# Patient Record
Sex: MALE | Race: WHITE
[De-identification: names, ages, dates, MRNs, and addresses within clinical notes are randomized per-mention and may not be internally consistent; named-entity substitution may affect disease eponyms.]

---

## 2017-10-15 ENCOUNTER — HOSPITAL ENCOUNTER (INPATIENT)
Dept: HOSPITAL 45 - C.EDB | Age: 72
LOS: 9 days | Discharge: NURSING, REHAB OR LONG-TERM CARE | DRG: 291 | End: 2017-10-24
Attending: HOSPITALIST | Admitting: FAMILY MEDICINE
Payer: COMMERCIAL

## 2017-10-15 VITALS
TEMPERATURE: 97.16 F | SYSTOLIC BLOOD PRESSURE: 190 MMHG | OXYGEN SATURATION: 94 % | HEART RATE: 72 BPM | DIASTOLIC BLOOD PRESSURE: 73 MMHG

## 2017-10-15 VITALS — OXYGEN SATURATION: 96 % | HEART RATE: 72 BPM

## 2017-10-15 VITALS
HEIGHT: 71 IN | BODY MASS INDEX: 34.38 KG/M2 | WEIGHT: 245.59 LBS | HEIGHT: 71 IN | BODY MASS INDEX: 34.38 KG/M2 | BODY MASS INDEX: 34.38 KG/M2 | WEIGHT: 245.59 LBS

## 2017-10-15 VITALS
OXYGEN SATURATION: 99 % | DIASTOLIC BLOOD PRESSURE: 87 MMHG | SYSTOLIC BLOOD PRESSURE: 175 MMHG | HEART RATE: 76 BPM | TEMPERATURE: 97.88 F

## 2017-10-15 VITALS — DIASTOLIC BLOOD PRESSURE: 85 MMHG | SYSTOLIC BLOOD PRESSURE: 163 MMHG | HEART RATE: 76 BPM

## 2017-10-15 VITALS — OXYGEN SATURATION: 93 % | HEART RATE: 86 BPM

## 2017-10-15 VITALS
TEMPERATURE: 98.06 F | HEART RATE: 70 BPM | OXYGEN SATURATION: 95 % | DIASTOLIC BLOOD PRESSURE: 74 MMHG | SYSTOLIC BLOOD PRESSURE: 152 MMHG

## 2017-10-15 VITALS — OXYGEN SATURATION: 96 %

## 2017-10-15 DIAGNOSIS — Z89.431: ICD-10-CM

## 2017-10-15 DIAGNOSIS — I50.43: ICD-10-CM

## 2017-10-15 DIAGNOSIS — K76.0: ICD-10-CM

## 2017-10-15 DIAGNOSIS — Z95.1: ICD-10-CM

## 2017-10-15 DIAGNOSIS — T43.215A: ICD-10-CM

## 2017-10-15 DIAGNOSIS — E11.51: ICD-10-CM

## 2017-10-15 DIAGNOSIS — E03.9: ICD-10-CM

## 2017-10-15 DIAGNOSIS — T43.015A: ICD-10-CM

## 2017-10-15 DIAGNOSIS — Z95.810: ICD-10-CM

## 2017-10-15 DIAGNOSIS — N18.4: ICD-10-CM
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
## 2017-10-15 DIAGNOSIS — Z86.74: ICD-10-CM

## 2017-10-15 DIAGNOSIS — E11.319: ICD-10-CM

## 2017-10-15 DIAGNOSIS — E11.42: ICD-10-CM

## 2017-10-15 DIAGNOSIS — I25.2: ICD-10-CM

## 2017-10-15 DIAGNOSIS — G25.1: ICD-10-CM

## 2017-10-15 DIAGNOSIS — G47.33: ICD-10-CM

## 2017-10-15 DIAGNOSIS — I13.0: Primary | ICD-10-CM

## 2017-10-15 DIAGNOSIS — I25.10: ICD-10-CM

## 2017-10-15 DIAGNOSIS — Z79.4: ICD-10-CM

## 2017-10-15 DIAGNOSIS — T43.295A: ICD-10-CM

## 2017-10-15 DIAGNOSIS — F32.9: ICD-10-CM

## 2017-10-15 DIAGNOSIS — K21.9: ICD-10-CM

## 2017-10-15 DIAGNOSIS — R91.1: ICD-10-CM

## 2017-10-15 LAB
ALP SERPL-CCNC: 39 U/L (ref 45–117)
ALT SERPL-CCNC: 34 U/L (ref 12–78)
AMYLASE SERPL-CCNC: 19 U/L (ref 25–115)
ANION GAP SERPL CALC-SCNC: 6 MMOL/L (ref 3–11)
AST SERPL-CCNC: 22 U/L (ref 15–37)
BASOPHILS # BLD: 0.02 K/UL (ref 0–0.2)
BASOPHILS NFR BLD: 0.4 %
BUN SERPL-MCNC: 22 MG/DL (ref 7–18)
BUN/CREAT SERPL: 10.3 (ref 10–20)
CALCIUM SERPL-MCNC: 8.2 MG/DL (ref 8.5–10.1)
CHLORIDE SERPL-SCNC: 107 MMOL/L (ref 98–107)
CKMB/CK RATIO: 1.9 (ref 0–3)
CKMB/CK RATIO: 1.9 (ref 0–3)
CO2 SERPL-SCNC: 27 MMOL/L (ref 21–32)
COMPLETE: YES
CREAT CL PREDICTED SERPL C-G-VRATE: 41 ML/MIN
CREAT SERPL-MCNC: 2.1 MG/DL (ref 0.6–1.4)
EOSINOPHIL NFR BLD AUTO: 160 K/UL (ref 130–400)
GLUCOSE SERPL-MCNC: 149 MG/DL (ref 70–99)
HCT VFR BLD CALC: 38.4 % (ref 42–52)
IG%: 0.4 %
IMM GRANULOCYTES NFR BLD AUTO: 22.7 %
INR PPP: 1.1 (ref 0.9–1.1)
LYMPHOCYTES # BLD: 1.12 K/UL (ref 1.2–3.4)
MAGNESIUM SERPL-MCNC: 2.4 MG/DL (ref 1.8–2.4)
MCH RBC QN AUTO: 28.8 PG (ref 25–34)
MCHC RBC AUTO-ENTMCNC: 33.9 G/DL (ref 32–36)
MCV RBC AUTO: 85.1 FL (ref 80–100)
MONOCYTES NFR BLD: 8.7 %
NEUTROPHILS # BLD AUTO: 1.8 %
NEUTROPHILS NFR BLD AUTO: 66 %
PARTIAL THROMBOPLASTIN RATIO: 1.2
PMV BLD AUTO: 10.8 FL (ref 7.4–10.4)
POTASSIUM SERPL-SCNC: 3.6 MMOL/L (ref 3.5–5.1)
PROTHROMBIN TIME: 11.3 SECONDS (ref 9–12)
RBC # BLD AUTO: 4.51 M/UL (ref 4.7–6.1)
SODIUM SERPL-SCNC: 140 MMOL/L (ref 136–145)
WBC # BLD AUTO: 4.94 K/UL (ref 4.8–10.8)

## 2017-10-15 RX ADMIN — Medication SCH MG: at 20:46

## 2017-10-15 RX ADMIN — Medication SCH MG: at 20:47

## 2017-10-15 RX ADMIN — SODIUM CHLORIDE SCH MLS/HR: 900 INJECTION, SOLUTION INTRAVENOUS at 12:16

## 2017-10-15 RX ADMIN — HEPARIN SODIUM SCH UNIT: 10000 INJECTION, SOLUTION INTRAVENOUS; SUBCUTANEOUS at 20:42

## 2017-10-15 RX ADMIN — IPRATROPIUM BROMIDE AND ALBUTEROL SULFATE SCH ML: .5; 3 SOLUTION RESPIRATORY (INHALATION) at 15:10

## 2017-10-15 RX ADMIN — Medication SCH MG: at 13:35

## 2017-10-15 RX ADMIN — DOXYCYCLINE HYCLATE SCH MG: 100 CAPSULE, GELATIN COATED ORAL at 22:30

## 2017-10-15 RX ADMIN — DULOXETINE SCH MG: 30 CAPSULE, DELAYED RELEASE PELLETS ORAL at 20:48

## 2017-10-15 RX ADMIN — INSULIN ASPART SCH UNITS: 100 INJECTION, SOLUTION INTRAVENOUS; SUBCUTANEOUS at 13:24

## 2017-10-15 RX ADMIN — PREGABALIN SCH MG: 150 CAPSULE ORAL at 20:45

## 2017-10-15 RX ADMIN — BUPROPION HYDROCHLORIDE SCH MG: 150 TABLET, EXTENDED RELEASE ORAL at 13:36

## 2017-10-15 RX ADMIN — ALUMINUM ZIRCONIUM TRICHLOROHYDREX GLY SCH EA: 0.2 STICK TOPICAL at 22:32

## 2017-10-15 RX ADMIN — INSULIN ASPART SCH UNITS: 100 INJECTION, SOLUTION INTRAVENOUS; SUBCUTANEOUS at 17:19

## 2017-10-15 RX ADMIN — METOPROLOL SUCCINATE SCH MG: 25 TABLET, EXTENDED RELEASE ORAL at 20:47

## 2017-10-15 RX ADMIN — IPRATROPIUM BROMIDE AND ALBUTEROL SULFATE SCH ML: .5; 3 SOLUTION RESPIRATORY (INHALATION) at 18:57

## 2017-10-15 RX ADMIN — ALUMINUM ZIRCONIUM TRICHLOROHYDREX GLY SCH EA: 0.2 STICK TOPICAL at 16:00

## 2017-10-15 RX ADMIN — CLOPIDOGREL BISULFATE SCH MG: 75 TABLET, FILM COATED ORAL at 13:36

## 2017-10-15 RX ADMIN — INSULIN ASPART SCH UNITS: 100 INJECTION, SOLUTION INTRAVENOUS; SUBCUTANEOUS at 20:43

## 2017-10-15 RX ADMIN — PANTOPRAZOLE SCH MG: 40 TABLET, DELAYED RELEASE ORAL at 13:36

## 2017-10-15 RX ADMIN — DULOXETINE SCH MG: 60 CAPSULE, DELAYED RELEASE PELLETS ORAL at 13:35

## 2017-10-15 RX ADMIN — BUPROPION HYDROCHLORIDE SCH MG: 150 TABLET, EXTENDED RELEASE ORAL at 20:46

## 2017-10-15 RX ADMIN — INSULIN GLARGINE SCH UNITS: 100 INJECTION, SOLUTION SUBCUTANEOUS at 20:41

## 2017-10-15 RX ADMIN — ROSUVASTATIN CALCIUM SCH MG: 20 TABLET, FILM COATED ORAL at 13:36

## 2017-10-15 RX ADMIN — HEPARIN SODIUM SCH UNIT: 10000 INJECTION, SOLUTION INTRAVENOUS; SUBCUTANEOUS at 15:32

## 2017-10-15 NOTE — DIAGNOSTIC IMAGING REPORT
CHEST ONE VIEW PORTABLE



HISTORY:      Short of breath. Cough.



COMPARISON: Chest 10/19/2010.



FINDINGS: Patient is slightly rotated on this study. Left-sided single lead

pacemaker/defibrillator. Poststernotomy changes. Linear scarlike density within

the right midlung zone. Old, healed left-sided rib fractures. The heart remains

mildly enlarged. No pleural effusions. No pneumothorax. No evidence for

pulmonary edema.



IMPRESSION:



1. Stable mild cardiomegaly.

2. Linear scarlike density within the right midlung zone.







Electronically signed by:  Derrick Estrada M.D.

10/15/2017 7:31 AM



Dictated Date/Time:  10/15/2017 7:29 AM

## 2017-10-15 NOTE — HISTORY AND PHYSICAL
History & Physical


Date & Time of Service:


Oct 15, 2017 at 10:41


Chief Complaint:


Sob,Nausea,Coughing Up Phlem,Sore Throat


Primary Care Physician:


Javed Mccormick M.D.


History of Present Illness


Source:  patient, family


This is a 72 year old male with a PMH of CAD s/p CABGx2, Hx. of Cardiac Arrest 

and V-Fib s/p AICD placement, insulin dependent DM2 with complications 

including peripheral vascular disease s/p R foot transmetatarsal amputation, 

CKD stage IV, SABRA on nocturnal O2, Hypothyroidism, Mood Disorder with 

depression - presents with shortness of breath. As per patient, he has been 

having shortness of breath and "throat pain" for the past three weeks. He has 

seen his primary care physician; and was given antibiotics at that time. He 

said he never fully recovered. Last evening, while laying down, he developed 

worsening shortness of breath. He uses O2 nocturnally due to sleep apnea (did 

not tolerate CPAP mask); and he turned it up to 3L of O2 with no help. He also 

developed some chest/throat pain. Denies fevers/chills. Denies nausea/vomiting.





Past Medical/Surgical History


Surgical Problems:


(1) Hx of CABG


Status: Resolved  











Social History


Smoking Status:  Never Smoker


Marital Status:  


Housing status:  lives with family





Immunizations


History of Influenza Vaccine:  Yes


Influenza Vaccine Date:  Sep 18, 2010


History of Tetanus Vaccine?:  Unknown


History of Pneumococcal:  Yes


Pneumococcal Date:  Sep 18, 2010


History of Hepatitis B Vaccine:  No





Multi-Drug Resistant Organisms


History of MDRO:  No





Allergies


Coded Allergies:  


     Moxifloxacin (Unverified  Adverse Reaction, Severe, "DEPLETED POTASSIUM 

AND MAGNESIUM. CARDIAC ARREST" PT WIFE, 10/15/17)





Home Medications


Scheduled


Allopurinol (Zyloprim), 300 MG PO DAILY


Amlodipine (Norvasc), 10 MG PO DAILY


Aspirin (Aspirin Ec), 81 MG PO DAILY


Bupropion Hcl (Smoking Deterre (Bupropion Hcl Sr), 1 TAB PO BID


Cholecalciferol (Vitamin D3), 2 TAB PO DAILY


Clopidogrel (Plavix), 75 MG PO DAILY


Duloxetine HCl (Cymbalta), 1 CAP PO HS


Duloxetine Hcl (Cymbalta), 60 MG PO DAILY


Fenofibrate (Tricor), 200 MG PO DAILY


Imipramine Hcl (Tofranil), 25 MG PO QAM


Imipramine Hcl (Tofranil), 2 TAB PO HS


Insulin Aspart (Novolog), 29 UNITS SC AC


Insulin Glargine (Lantus Solostar), 52 UNITS SC AMPM


Levothyroxine Sodium (Levothyroxine Sodium), 1 TAB PO DAILYBB


Magnesium Oxide (Mag-Ox), 400 MG PO BID


Metoprolol Succ (Toprol Xl) (Toprol-Xl), 1.5 TAB PO HS


Multivitamin (Multivitamin), 1 TAB PO DAILY


Nitroglycerin (Nitrostat), 0.4 MG UT PRN


Omega-3 Fatty Acids (Fish Oil 1200 mg), 2 CAP PO BID


Pantoprazole (Protonix), 40 MG PO DAILY


Pregabalin (Lyrica), 150 MG PO BID


Rosuvastatin Calcium (Crestor), 40 MG PO DAILY





Scheduled PRN


Oxymetazoline Hcl (Afrin 0.05% Nasal Spray), 2 SPRAYS ESTEVAN BID PRN for Nasal 

Congestion





Miscellaneous Medications


Home O2 Therapy (Oxygen), 2 LITERS NA





Review of Systems


Constitutional:  No fever, No chills, No sweats, No weakness, No fatigue


Respiratory:  + cough, + shortness of breath, + dyspnea on exertion, + dyspnea 

at rest, No sputum, No wheezing, No hemoptysis


Cardiovascular:  + chest pain, + orthopnea, + PND, + edema, No claudication, No 

palpitations


Abdomen:  No pain, No nausea, No vomiting, No diarrhea, No constipation, No GI 

bleeding, No problem reported


Musculoskeletal:  No joint pain, No muscle pain, No swelling, No calf pain


Genitourinary - Male:  No hematuria, No dysuria, No urinary frequency, No 

urinary urgency


Neurologic:  No memory loss, No weakness, No numbness/tingling, No vertigo


Psychiatric:  + depression symptoms (controlled with medications), No anxiety, 

No insomnia


Hematologic / Lymphatic:  No abnormal bleeding/bruising


Integumentary:  No rash


Allergic / Immunologic:  No environmental allergies, No seasonal allergies





Physical Exam


Vital Signs











  Date Time  Temp Pulse Resp B/P (MAP) Pulse Ox O2 Delivery O2 Flow Rate FiO2


 


10/15/17 10:00  68 20 177/94 96 Nasal Cannula 2.0 


 


10/15/17 09:30  72 20 173/81 94 Nasal Cannula 2.0 


 


10/15/17 09:00  68 20 161/89 94 Room Air  


 


10/15/17 08:30  68 20 177/86 95 Nasal Cannula 2.0 


 


10/15/17 08:00  64 20 169/86 94 Nasal Cannula 2.0 


 


10/15/17 07:48      Nasal Cannula 2.0 


 


10/15/17 07:30  68 18 168/88 91 Room Air  


 


10/15/17 06:51  67 18 173/90 94 Room Air  


 


10/15/17 06:51      Room Air  


 


10/15/17 06:51  65      


 


10/15/17 06:31 36.6 66 20 180/85 94 Room Air  


 


10/15/17 06:31     94 Room Air  








General Appearance:  no apparent distress, + obese


Head:  normocephalic, atraumatic


Eyes:  normal inspection


ENT:  hearing grossly normal


Respiratory/Chest:  chest non-tender, lungs clear, normal breath sounds, no 

respiratory distress, no accessory muscle use


Cardiovascular:  regular rate, rhythm, no edema, no gallop, no JVD, no murmur, 

normal peripheral pulses


Abdomen/GI:  normal bowel sounds, non tender, soft


Back:  no muscle spasm


Extremities/Musculoskelatal:  normal inspection, no calf tenderness, normal 

capillary refill, no pedal edema, normal range of motion, + pertinent finding (

R transmetatarsal amputation)


Neurologic/Psych:  no motor/sensory deficits, alert, normal mood/affect


Skin:  normal color


Lymphatic:  no adenopathy





Diagnostics


Laboratory Results





Results Past 24 Hours








Test


  10/15/17


07:10 10/15/17


07:18 10/15/17


10:32 Range/Units


 


 


White Blood Count 4.94   4.8-10.8  K/uL


 


Red Blood Count 4.51   4.7-6.1  M/uL


 


Hemoglobin 13.0   14.0-18.0  g/dL


 


Hematocrit 38.4   42-52  %


 


Mean Corpuscular Volume 85.1     fL


 


Mean Corpuscular Hemoglobin 28.8   25-34  pg


 


Mean Corpuscular Hemoglobin


Concent 33.9


  


  


  32-36  g/dl


 


 


Platelet Count 160   130-400  K/uL


 


Mean Platelet Volume 10.8   7.4-10.4  fL


 


Neutrophils (%) (Auto) 66.0    %


 


Lymphocytes (%) (Auto) 22.7    %


 


Monocytes (%) (Auto) 8.7    %


 


Eosinophils (%) (Auto) 1.8    %


 


Basophils (%) (Auto) 0.4    %


 


Neutrophils # (Auto) 3.26   1.4-6.5  K/uL


 


Lymphocytes # (Auto) 1.12   1.2-3.4  K/uL


 


Monocytes # (Auto) 0.43   0.11-0.59  K/uL


 


Eosinophils # (Auto) 0.09   0-0.5  K/uL


 


Basophils # (Auto) 0.02   0-0.2  K/uL


 


RDW Standard Deviation 42.9   36.4-46.3  fL


 


RDW Coefficient of Variation 13.8   11.5-14.5  %


 


Immature Granulocyte % (Auto) 0.4    %


 


Immature Granulocyte # (Auto) 0.02   0.00-0.02  K/uL


 


Prothrombin Time


  11.3


  


  


  9.0-12.0


SECONDS


 


Prothromb Time International


Ratio 1.1


  


  


  0.9-1.1  


 


 


Activated Partial


Thromboplast Time 29.9


  


  


  21.0-31.0


SECONDS


 


Partial Thromboplastin Ratio 1.2    


 


Sodium Level 140   136-145  mmol/L


 


Potassium Level 3.6   3.5-5.1  mmol/L


 


Chloride Level 107     mmol/L


 


Carbon Dioxide Level 27   21-32  mmol/L


 


Anion Gap 6.0   3-11  mmol/L


 


Blood Urea Nitrogen 22   7-18  mg/dl


 


Creatinine


  2.10


  


  


  0.60-1.40


mg/dl


 


Est Creatinine Clear Calc


Drug Dose 41.0


  


  


   ml/min


 


 


Estimated GFR (


American) 35.4


  


  


   


 


 


Estimated GFR (Non-


American 30.5


  


  


   


 


 


BUN/Creatinine Ratio 10.3   10-20  


 


Random Glucose 149   70-99  mg/dl


 


Calcium Level 8.2   8.5-10.1  mg/dl


 


Magnesium Level 2.4   1.8-2.4  mg/dl


 


Total Bilirubin 0.5   0.2-1  mg/dl


 


Direct Bilirubin 0.2   0-0.2  mg/dl


 


Aspartate Amino Transf


(AST/SGOT) 22


  


  


  15-37  U/L


 


 


Alanine Aminotransferase


(ALT/SGPT) 34


  


  


  12-78  U/L


 


 


Alkaline Phosphatase 39     U/L


 


Total Creatine Kinase 162     U/L


 


Creatine Kinase MB 3.0   0.5-3.6  ng/ml


 


Creatine Kinase MB Ratio 1.9   0-3.0  


 


Pro-B-Type Natriuretic Peptide 479   0-900  pg/ml


 


Total Protein 6.9   6.4-8.2  gm/dl


 


Albumin 3.7   3.4-5.0  gm/dl


 


Bedside Troponin I  < 0.030  0-0.045  ng/ml











Diagnostic Radiology


CHEST ONE VIEW PORTABLE





HISTORY:      Short of breath. Cough.





COMPARISON: Chest 10/19/2010.





FINDINGS: Patient is slightly rotated on this study. Left-sided single lead


pacemaker/defibrillator. Poststernotomy changes. Linear scarlike density within


the right midlung zone. Old, healed left-sided rib fractures. The heart remains


mildly enlarged. No pleural effusions. No pneumothorax. No evidence for


pulmonary edema.





IMPRESSION:





1. Stable mild cardiomegaly.


2. Linear scarlike density within the right midlung zone.





EKG


Suspected ectopic atrial rhythm with 1st degree A-V block


Nonspecific T wave abnormality


Prolonged QT


Abnormal ECG





Impression


Assessment and Plan


This is a 72 year old male with a PMH of CAD s/p CABGx2, Hx. of Cardiac Arrest 

and V-Fib s/p AICD placement, insulin dependent DM2 with complications 

including peripheral vascular disease s/p R foot transmetatarsal amputation, 

CKD stage IV, SABRA on nocturnal O2, Hypothyroidism, Mood Disorder with 

depression - presents with shortness of breath





Shortness of Breath


unsure of the cause of SOB; possibly obesity-hypoventilation, worsening SABRA, 

bronchitis, CHF, or ACS


will check an echo; was given Lasix in the ED


started on Doxycycline


will monitor in tele, trend cardiac enzymes


recheck CXR in AM


nocturnal pulse ox


nocturnal O2 and intermittent O2 throughout the day


nebs PRN





Insulin Dependent DM2


patient is on a high dose of insulin, takes 52 units twice daily of Lantus as 

well as a sliding scale of Novolog


will check an updated Ha1c


Start with 45 units Lantus BID and work our way up


monitor BSGs AC and HS





HTN


uncontrolled


BP > 180/100 on admission


patient did not take morning medications


given Hydralazine in the ED


gave dose of amlodipine, monitor BP and we will adjust meds accordingly





CKD stage IV


patient states that he was to have further kidney w/up


creatinine at one point went up to >4


currently creatinine is down to 2.1


as per family request, would like to see nephrology


consulted nephrology, received one dose of Lasix in the ED





Hx. of CAD s/p CABGx2


unsure if this is atypical ACS related shortness of breath


therefore, monitor in tele - trend cardiac enzymes


check an echo


continue current medications





Hx. of cardiac arrest and V-Fib s/p AICD





SABRA


did not tolerate CPAP


uses 2L O2 nocturnally


check nocturnal pulse ox as SOB is worse at night





Hypothyroidism


check TSH


continue current dose of Synthroid





Mood Disorder and Depression


continue current medications





DVT ppx


subq heparin





FULL CODE





VTE Prophylaxis


VTE Risk Assessment Done? Y/N:  Yes


Risk Level:  Moderate

## 2017-10-15 NOTE — EMERGENCY ROOM VISIT NOTE
History


Report prepared by Theresa:  Elías Freeman


Under the Supervision of:  Dr. Allyson Reza M.D.


First contact with patient:  06:38


Chief Complaint:  SHORTNESS OF BREATH


Stated Complaint:  SOB,NAUSEA,COUGHING UP PHLEM,SORE THROAT


Nursing Triage Summary:  


Pt reports, "I am on oxygen at home. I have it set on 2. I went to bed and the 


longer I laid there, the worse it got. I couldn't breathe right. I've had a 


couple heart attacks and have a pacemaker."





Denies cp.





History of Present Illness


The patient is a 72 year old male who presents to the Emergency Room with 

complaints of SOB beginning two hours ago. The patient states that he was 

watching television all night while lying down in bed. When he went to bed at 

around 0500 this morning, he was unable to fall asleep due to his shortness of 

breath. He notes experiencing SOB for the past 3 days, but notes that his 

symptoms worsened this morning. He also reports that his symptoms worsen when 

lying down. The patient states that he finds relief of his symptoms when 

sitting upright. He notes that he has a history of diabetes mellitus and stage 

four kidney disease but is not on dialysis. He also notes a history of CABG 

surgery, and has a pacemaker and defibrillator implanted. He reports a cough 

and shoulder pain with movement. He denies any chest pain and abdominal pain. 

He did not take any aspirin for his symptoms prior to arrival and is not 

currently on any antibiotics. The patient is on Plavix.





   Source of History:  patient


   Onset:  0500 this morning


   Position:  chest


   Timing:  constant


   Modifying Factors (Worsening):  other (lyin down)


   Associated Symptoms:  + cough, No chest pain, No abdominal pain


Note:


he notes shoulder pain with movement





Review of Systems


See HPI for pertinent positives & negatives. A total of 10 systems reviewed and 

were otherwise negative.





Past Medical & Surgical


Medical Problems:


(1) Diabetes


(2) Shortness of breath


(3) Stage 4 chronic kidney disease


Surgical Problems:


(1) Hx of CABG








Social History


Smoking Status:  Never Smoker


Marital Status:  


Housing Status:  lives with significant other


Occupation Status:  retired





Current/Historical Medications


Scheduled


Allopurinol (Zyloprim), 300 MG PO DAILY


Amlodipine (Norvasc), 10 MG PO DAILY


Aspirin (Aspirin Ec), 81 MG PO DAILY


Bupropion Hcl (Smoking Deterre (Bupropion Hcl Sr), 1 TAB PO BID


Cholecalciferol (Vitamin D3), 2 TAB PO DAILY


Clopidogrel (Plavix), 75 MG PO DAILY


Duloxetine HCl (Cymbalta), 1 CAP PO HS


Duloxetine Hcl (Cymbalta), 60 MG PO DAILY


Fenofibrate (Tricor), 200 MG PO DAILY


Imipramine Hcl (Tofranil), 25 MG PO QAM


Imipramine Hcl (Tofranil), 2 TAB PO HS


Insulin Aspart (Novolog), 29 UNITS SC AC


Insulin Glargine (Lantus Solostar), 52 UNITS SC AMPM


Levothyroxine Sodium (Levothyroxine Sodium), 1 TAB PO DAILYBB


Magnesium Oxide (Mag-Ox), 400 MG PO BID


Metoprolol Succ (Toprol Xl) (Toprol-Xl), 1.5 TAB PO HS


Multivitamin (Multivitamin), 1 TAB PO DAILY


Nitroglycerin (Nitrostat), 0.4 MG UT PRN


Omega-3 Fatty Acids (Fish Oil 1200 mg), 2 CAP PO BID


Pantoprazole (Protonix), 40 MG PO DAILY


Pregabalin (Lyrica), 150 MG PO BID


Rosuvastatin Calcium (Crestor), 40 MG PO DAILY





Scheduled PRN


Oxymetazoline Hcl (Afrin 0.05% Nasal Spray), 2 SPRAYS ESTEVAN BID PRN for Nasal 

Congestion





Miscellaneous Medications


Home O2 Therapy (Oxygen), 2 LITERS NA





Allergies


Coded Allergies:  


     Moxifloxacin (Unverified  Adverse Reaction, Severe, "DEPLETED POTASSIUM 

AND MAGNESIUM. CARDIAC ARREST" PT WIFE, 10/15/17)





Physical Exam


Vital Signs











  Date Time  Temp Pulse Resp B/P (MAP) Pulse Ox O2 Delivery O2 Flow Rate FiO2


 


10/15/17 10:30  58 20 163/82 96 Nasal Cannula 2.0 


 


10/15/17 10:00     96 Nasal Cannula 2.0 


 


10/15/17 10:00  68 20 177/94 96 Nasal Cannula 2.0 


 


10/15/17 09:30  72 20 173/81 94 Nasal Cannula 2.0 


 


10/15/17 09:00  68 20 161/89 94 Room Air  


 


10/15/17 08:30  68 20 177/86 95 Nasal Cannula 2.0 


 


10/15/17 08:00  64 20 169/86 94 Nasal Cannula 2.0 


 


10/15/17 07:48      Nasal Cannula 2.0 


 


10/15/17 07:30  68 18 168/88 91 Room Air  


 


10/15/17 06:51  67 18 173/90 94 Room Air  


 


10/15/17 06:51      Room Air  


 


10/15/17 06:51  65      


 


10/15/17 06:31 36.6 66 20 180/85 94 Room Air  


 


10/15/17 06:31     94 Room Air  











Physical Exam


Vital signs reviewed.





General: Obese but generally well-appearing, in no significant distress.





HEENT: No scleral icterus, PERRLA, neck supple.  Atraumatic.





Cardiovascular: Regular rate and rhythm, no extra sounds, pacemaker implanted





Pulmonary: Clear to auscultation bilaterally, diminished breath sounds 

bilaterally





Abdomen: Soft, nontender, nondistended, positive bowel sounds.





Musculoskeletal: Atraumatic, minimal peripheral edema, post sternotomy scar





Neurologic: Patient awake alert and oriented x 3.





Skin: Warm, dry, no rash





Medical Decision & Procedures


ER Provider


Diagnostic Interpretation:


Radiology results as stated below per my review and radiologist interpretation:





CHEST ONE VIEW PORTABLE





HISTORY:      Short of breath. Cough.





COMPARISON: Chest 10/19/2010.





FINDINGS: Patient is slightly rotated on this study. Left-sided single lead


pacemaker/defibrillator. Poststernotomy changes. Linear scarlike density within


the right midlung zone. Old, healed left-sided rib fractures. The heart remains


mildly enlarged. No pleural effusions. No pneumothorax. No evidence for


pulmonary edema.





IMPRESSION:





1. Stable mild cardiomegaly.


2. Linear scarlike density within the right midlung zone.





Electronically signed by:  Derrick Estrada M.D.


10/15/2017 7:31 AM





Laboratory Results


10/15/17 07:10








Red Blood Count 4.51, Mean Corpuscular Volume 85.1, Mean Corpuscular Hemoglobin 

28.8, Mean Corpuscular Hemoglobin Concent 33.9, Mean Platelet Volume 10.8, 

Neutrophils (%) (Auto) 66.0, Lymphocytes (%) (Auto) 22.7, Monocytes (%) (Auto) 

8.7, Eosinophils (%) (Auto) 1.8, Basophils (%) (Auto) 0.4, Neutrophils # (Auto) 

3.26, Lymphocytes # (Auto) 1.12, Monocytes # (Auto) 0.43, Eosinophils # (Auto) 

0.09, Basophils # (Auto) 0.02





10/15/17 07:10

















Test


  10/15/17


07:10 10/15/17


07:18


 


White Blood Count


  4.94 K/uL


(4.8-10.8) 


 


 


Red Blood Count


  4.51 M/uL


(4.7-6.1) 


 


 


Hemoglobin


  13.0 g/dL


(14.0-18.0) 


 


 


Hematocrit 38.4 % (42-52)  


 


Mean Corpuscular Volume


  85.1 fL


() 


 


 


Mean Corpuscular Hemoglobin


  28.8 pg


(25-34) 


 


 


Mean Corpuscular Hemoglobin


Concent 33.9 g/dl


(32-36) 


 


 


Platelet Count


  160 K/uL


(130-400) 


 


 


Mean Platelet Volume


  10.8 fL


(7.4-10.4) 


 


 


Neutrophils (%) (Auto) 66.0 %  


 


Lymphocytes (%) (Auto) 22.7 %  


 


Monocytes (%) (Auto) 8.7 %  


 


Eosinophils (%) (Auto) 1.8 %  


 


Basophils (%) (Auto) 0.4 %  


 


Neutrophils # (Auto)


  3.26 K/uL


(1.4-6.5) 


 


 


Lymphocytes # (Auto)


  1.12 K/uL


(1.2-3.4) 


 


 


Monocytes # (Auto)


  0.43 K/uL


(0.11-0.59) 


 


 


Eosinophils # (Auto)


  0.09 K/uL


(0-0.5) 


 


 


Basophils # (Auto)


  0.02 K/uL


(0-0.2) 


 


 


RDW Standard Deviation


  42.9 fL


(36.4-46.3) 


 


 


RDW Coefficient of Variation


  13.8 %


(11.5-14.5) 


 


 


Immature Granulocyte % (Auto) 0.4 %  


 


Immature Granulocyte # (Auto)


  0.02 K/uL


(0.00-0.02) 


 


 


Prothrombin Time


  11.3 SECONDS


(9.0-12.0) 


 


 


Prothromb Time International


Ratio 1.1 (0.9-1.1) 


  


 


 


Activated Partial


Thromboplast Time 29.9 SECONDS


(21.0-31.0) 


 


 


Partial Thromboplastin Ratio 1.2  


 


Anion Gap


  6.0 mmol/L


(3-11) 


 


 


Est Creatinine Clear Calc


Drug Dose 41.0 ml/min 


  


 


 


Estimated GFR (


American) 35.4 


  


 


 


Estimated GFR (Non-


American 30.5 


  


 


 


BUN/Creatinine Ratio 10.3 (10-20)  


 


Calcium Level


  8.2 mg/dl


(8.5-10.1) 


 


 


Magnesium Level


  2.4 mg/dl


(1.8-2.4) 


 


 


Total Bilirubin


  0.5 mg/dl


(0.2-1) 


 


 


Direct Bilirubin


  0.2 mg/dl


(0-0.2) 


 


 


Aspartate Amino Transf


(AST/SGOT) 22 U/L (15-37) 


  


 


 


Alanine Aminotransferase


(ALT/SGPT) 34 U/L (12-78) 


  


 


 


Alkaline Phosphatase


  39 U/L


() 


 


 


Total Creatine Kinase


  162 U/L


() 


 


 


Creatine Kinase MB


  3.0 ng/ml


(0.5-3.6) 


 


 


Creatine Kinase MB Ratio 1.9 (0-3.0)  


 


Pro-B-Type Natriuretic Peptide


  479 pg/ml


(0-900) 


 


 


Total Protein


  6.9 gm/dl


(6.4-8.2) 


 


 


Albumin


  3.7 gm/dl


(3.4-5.0) 


 


 


Amylase Level


  19 U/L


() 


 


 


Lipase


  104 U/L


() 


 


 


Hepatitis C Antibody Screen NEG (NEG)  


 


Bedside Troponin I


  


  < 0.030 ng/ml


(0-0.045)





Laboratory results per my review.





Medications Administered











 Medications


  (Trade)  Dose


 Ordered  Sig/Richard


 Route  Start Time


 Stop Time Status Last Admin


Dose Admin


 


 Aspirin


  (Aspirin Chew)  324 mg  NOW  STAT


 PO  10/15/17 06:54


 10/15/17 06:56 DC 10/15/17 07:14


324 MG


 


 Ondansetron HCl


  (Zofran Inj)  4 mg  NOW  STAT


 IV  10/15/17 07:15


 10/15/17 07:16 DC 10/15/17 07:19


4 MG


 


 Hydralazine HCl


  (HydrALAZINE INJ)  10 mg  NOW  STAT


 IV.  10/15/17 08:46


 10/15/17 08:47 DC 10/15/17 09:20


10 MG


 


 Albuterol/


 Ipratropium


  (Duoneb)  3 ml  NOW  STAT


 INH  10/15/17 08:47


 10/15/17 08:48 DC 10/15/17 09:20


3 ML


 


 Furosemide


  (Lasix Inj)  40 mg  NOW  STAT


 IV  10/15/17 09:00


 10/15/17 09:01 DC 10/15/17 09:20


40 MG


 


 Amlodipine


 Besylate


  (Norvasc Tab)  10 mg  1032  ONCE


 PO  10/15/17 10:32


 10/15/17 12:12 DC 10/15/17 12:47


10 MG











ECG


Indication:  SOB/dyspnea


Rate (beats per minute):  66


Rhythm:  junctional


Findings:  1st degree AV block, no acute ischemic change, prolonged QT, other (

diffuse t-wave flattening, likely previous septal infarct)





ED Course


0653: Past medical records reviewed. The patient was evaluated in room A3. A 

complete history and physical examination was performed. 





0654: Aspirin Chew 324mg Po





0715: Zofran Inj 4mg IV





0846: Hydralazine HCl 10mg IV





0847: Duoneb 3ml INH





0900: Lasix Inj 40mg IV





0928: I reevaluated and updated the patient.





0947: Upon reevaluation, the patient is resting comfortably. I discussed 

laboratory and radiographic results with him. He verbalized agreement of the 

treatment plan. I spoke with Dr. Sky of the [facility] Hospitalist Service. 

The patient will be evaluated for further management and care.





Medical Decision


Differential diagnosis:


Etiologies such as infections, reactive airway disease, pneumonia, pneumothorax

, COPD, CHF, cardiac ischemia, pulmonary embolism, musculoskeletal, 

gastrointestinal, as well as others were entertained.





This patient was evaluated and appeared to be in no significant distress.  The 

patient was feeling much more comfortable sitting up.  He did have some oxygen 

desaturations on room air but maintained his saturations well on 2 L nasal 

cannula.  Patient was given aspirin 324 mg to chew.  Chest x-ray was obtained 

and reveals some chronic scarring without evidence of acute focal lung 

consolidation or failure.  Laboratory work is fairly unrevealing.  Cardiac 

enzymes are negative.  EKG reveals no evidence of acute ischemia.  The patient 

was given 40 mg of IV Lasix as I do feel there is an element of fluid overload.

  Patient was given hydralazine 10 mg IV.  He was also given a DuoNeb treatment 

with minimal improvement in his symptoms.  Given the patient's extensive 

history of diabetes, hypertension, coronary artery disease and renal failure, he

'll be evaluated by the hospitalist service for admission and further 

management.  He has wife are aware of the plan and agree.





Medication Reconcilliation


Current Medication List:  was personally reviewed by me





Blood Pressure Screening


Patient's blood pressure:  Elevated blood pressure


Blood pressure disposition:  Referred to PCP





Consults


Time Called:  0947


Consulting Physician:  Dr. Sky - Hospitalist, [facility]


Returned Call:  [phone]


I reviewed the patient's case with Jeffrey Wolff.  He 

will evaluate the patient for further management.





Impression





 Primary Impression:  


 Hypoxia


 Additional Impressions:  


 SOB (shortness of breath)


 Hypertension





Scribe Attestation


The scribe's documentation has been prepared under my direction and personally 

reviewed by me in its entirety. I confirm that the note above accurately 

reflects all work, treatment, procedures, and medical decision making performed 

by me.





Departure Information


Dispostion


Being Evaluated By Hospitalist





Referrals


Javed Mccormick M.D. (PCP)





Patient Instructions


My [facility]





Problem Qualifiers

## 2017-10-16 VITALS
TEMPERATURE: 97.7 F | HEART RATE: 68 BPM | SYSTOLIC BLOOD PRESSURE: 144 MMHG | OXYGEN SATURATION: 94 % | DIASTOLIC BLOOD PRESSURE: 77 MMHG

## 2017-10-16 VITALS
DIASTOLIC BLOOD PRESSURE: 76 MMHG | HEART RATE: 67 BPM | SYSTOLIC BLOOD PRESSURE: 138 MMHG | TEMPERATURE: 98.78 F | OXYGEN SATURATION: 95 %

## 2017-10-16 VITALS
DIASTOLIC BLOOD PRESSURE: 68 MMHG | OXYGEN SATURATION: 96 % | SYSTOLIC BLOOD PRESSURE: 134 MMHG | HEART RATE: 71 BPM | TEMPERATURE: 98.6 F

## 2017-10-16 VITALS
DIASTOLIC BLOOD PRESSURE: 74 MMHG | HEART RATE: 75 BPM | SYSTOLIC BLOOD PRESSURE: 146 MMHG | OXYGEN SATURATION: 92 % | TEMPERATURE: 98.42 F

## 2017-10-16 VITALS
SYSTOLIC BLOOD PRESSURE: 134 MMHG | OXYGEN SATURATION: 96 % | HEART RATE: 61 BPM | DIASTOLIC BLOOD PRESSURE: 83 MMHG | TEMPERATURE: 97.88 F

## 2017-10-16 VITALS
DIASTOLIC BLOOD PRESSURE: 67 MMHG | OXYGEN SATURATION: 97 % | SYSTOLIC BLOOD PRESSURE: 127 MMHG | HEART RATE: 60 BPM | TEMPERATURE: 97.7 F

## 2017-10-16 VITALS — OXYGEN SATURATION: 96 % | HEART RATE: 84 BPM

## 2017-10-16 VITALS
SYSTOLIC BLOOD PRESSURE: 129 MMHG | OXYGEN SATURATION: 94 % | TEMPERATURE: 98.06 F | DIASTOLIC BLOOD PRESSURE: 73 MMHG | HEART RATE: 95 BPM

## 2017-10-16 VITALS — HEART RATE: 83 BPM | OXYGEN SATURATION: 93 %

## 2017-10-16 VITALS — OXYGEN SATURATION: 94 %

## 2017-10-16 VITALS — OXYGEN SATURATION: 97 % | HEART RATE: 78 BPM

## 2017-10-16 LAB
ANION GAP SERPL CALC-SCNC: 7 MMOL/L (ref 3–11)
BUN SERPL-MCNC: 24 MG/DL (ref 7–18)
BUN/CREAT SERPL: 10.4 (ref 10–20)
CALCIUM SERPL-MCNC: 7.6 MG/DL (ref 8.5–10.1)
CHLORIDE SERPL-SCNC: 106 MMOL/L (ref 98–107)
CHOLEST/HDLC SERPL: 4 {RATIO}
CKMB/CK RATIO: 1.4 (ref 0–3)
CO2 SERPL-SCNC: 29 MMOL/L (ref 21–32)
CREAT CL PREDICTED SERPL C-G-VRATE: 37.8 ML/MIN
CREAT SERPL-MCNC: 2.27 MG/DL (ref 0.6–1.4)
EOSINOPHIL NFR BLD AUTO: 164 K/UL (ref 130–400)
EST. AVERAGE GLUCOSE BLD GHB EST-MCNC: 154 MG/DL
GLUCOSE SERPL-MCNC: 115 MG/DL (ref 70–99)
GLUCOSE UR QL: 33 MG/DL
HCT VFR BLD CALC: 35.8 % (ref 42–52)
KETONES UR QL STRIP: 25 MG/DL
MAGNESIUM SERPL-MCNC: 2.5 MG/DL (ref 1.8–2.4)
MCH RBC QN AUTO: 29.2 PG (ref 25–34)
MCHC RBC AUTO-ENTMCNC: 33.8 G/DL (ref 32–36)
MCV RBC AUTO: 86.5 FL (ref 80–100)
NITRITE UR QL STRIP: 373 MG/DL (ref 0–150)
PH UR: 133 MG/DL (ref 0–200)
PMV BLD AUTO: 10.7 FL (ref 7.4–10.4)
POTASSIUM SERPL-SCNC: 3.7 MMOL/L (ref 3.5–5.1)
RBC # BLD AUTO: 4.14 M/UL (ref 4.7–6.1)
SODIUM SERPL-SCNC: 142 MMOL/L (ref 136–145)
TSH SERPL-ACNC: 2.07 UIU/ML (ref 0.3–4.5)
VERY LOW DENSITY LIPOPROT CALC: 75 MG/DL
WBC # BLD AUTO: 4.24 K/UL (ref 4.8–10.8)

## 2017-10-16 RX ADMIN — Medication SCH MG: at 21:03

## 2017-10-16 RX ADMIN — BUPROPION HYDROCHLORIDE SCH MG: 150 TABLET, EXTENDED RELEASE ORAL at 21:05

## 2017-10-16 RX ADMIN — HEPARIN SODIUM SCH UNIT: 10000 INJECTION, SOLUTION INTRAVENOUS; SUBCUTANEOUS at 21:25

## 2017-10-16 RX ADMIN — INSULIN GLARGINE SCH UNITS: 100 INJECTION, SOLUTION SUBCUTANEOUS at 08:14

## 2017-10-16 RX ADMIN — INSULIN ASPART SCH UNITS: 100 INJECTION, SOLUTION INTRAVENOUS; SUBCUTANEOUS at 18:03

## 2017-10-16 RX ADMIN — Medication SCH TAB: at 08:07

## 2017-10-16 RX ADMIN — HEPARIN SODIUM SCH UNIT: 10000 INJECTION, SOLUTION INTRAVENOUS; SUBCUTANEOUS at 14:10

## 2017-10-16 RX ADMIN — IMIPRAMINE HYDROCHLORIDE SCH MG: 25 TABLET, FILM COATED ORAL at 08:05

## 2017-10-16 RX ADMIN — INSULIN GLARGINE SCH UNITS: 100 INJECTION, SOLUTION SUBCUTANEOUS at 21:14

## 2017-10-16 RX ADMIN — METOPROLOL SUCCINATE SCH MG: 25 TABLET, EXTENDED RELEASE ORAL at 21:04

## 2017-10-16 RX ADMIN — ALUMINUM ZIRCONIUM TRICHLOROHYDREX GLY SCH EA: 0.2 STICK TOPICAL at 15:32

## 2017-10-16 RX ADMIN — DULOXETINE SCH MG: 30 CAPSULE, DELAYED RELEASE PELLETS ORAL at 21:01

## 2017-10-16 RX ADMIN — BUPROPION HYDROCHLORIDE SCH MG: 150 TABLET, EXTENDED RELEASE ORAL at 08:07

## 2017-10-16 RX ADMIN — INSULIN ASPART SCH UNITS: 100 INJECTION, SOLUTION INTRAVENOUS; SUBCUTANEOUS at 08:15

## 2017-10-16 RX ADMIN — SODIUM CHLORIDE SCH MLS/HR: 900 INJECTION, SOLUTION INTRAVENOUS at 01:24

## 2017-10-16 RX ADMIN — Medication SCH MG: at 08:05

## 2017-10-16 RX ADMIN — ROSUVASTATIN CALCIUM SCH MG: 20 TABLET, FILM COATED ORAL at 08:07

## 2017-10-16 RX ADMIN — LEVOTHYROXINE SODIUM SCH MCG: 50 TABLET ORAL at 05:50

## 2017-10-16 RX ADMIN — IPRATROPIUM BROMIDE AND ALBUTEROL SULFATE SCH ML: .5; 3 SOLUTION RESPIRATORY (INHALATION) at 19:48

## 2017-10-16 RX ADMIN — IPRATROPIUM BROMIDE AND ALBUTEROL SULFATE SCH ML: .5; 3 SOLUTION RESPIRATORY (INHALATION) at 15:13

## 2017-10-16 RX ADMIN — INSULIN ASPART SCH UNITS: 100 INJECTION, SOLUTION INTRAVENOUS; SUBCUTANEOUS at 21:00

## 2017-10-16 RX ADMIN — DOXYCYCLINE HYCLATE SCH MG: 100 CAPSULE, GELATIN COATED ORAL at 12:29

## 2017-10-16 RX ADMIN — PANTOPRAZOLE SCH MG: 40 TABLET, DELAYED RELEASE ORAL at 08:06

## 2017-10-16 RX ADMIN — ALUMINUM ZIRCONIUM TRICHLOROHYDREX GLY SCH EA: 0.2 STICK TOPICAL at 08:00

## 2017-10-16 RX ADMIN — HEPARIN SODIUM SCH UNIT: 10000 INJECTION, SOLUTION INTRAVENOUS; SUBCUTANEOUS at 05:50

## 2017-10-16 RX ADMIN — DULOXETINE SCH MG: 60 CAPSULE, DELAYED RELEASE PELLETS ORAL at 08:07

## 2017-10-16 RX ADMIN — PREGABALIN SCH MG: 150 CAPSULE ORAL at 08:27

## 2017-10-16 RX ADMIN — AMLODIPINE BESYLATE SCH MG: 5 TABLET ORAL at 08:06

## 2017-10-16 RX ADMIN — IPRATROPIUM BROMIDE AND ALBUTEROL SULFATE SCH ML: .5; 3 SOLUTION RESPIRATORY (INHALATION) at 10:57

## 2017-10-16 RX ADMIN — ALUMINUM ZIRCONIUM TRICHLOROHYDREX GLY SCH EA: 0.2 STICK TOPICAL at 23:47

## 2017-10-16 RX ADMIN — IPRATROPIUM BROMIDE AND ALBUTEROL SULFATE SCH ML: .5; 3 SOLUTION RESPIRATORY (INHALATION) at 07:07

## 2017-10-16 RX ADMIN — INSULIN ASPART SCH UNITS: 100 INJECTION, SOLUTION INTRAVENOUS; SUBCUTANEOUS at 12:37

## 2017-10-16 RX ADMIN — Medication SCH MG: at 21:02

## 2017-10-16 RX ADMIN — DOXYCYCLINE HYCLATE SCH MG: 100 CAPSULE, GELATIN COATED ORAL at 22:57

## 2017-10-16 RX ADMIN — PREGABALIN SCH MG: 150 CAPSULE ORAL at 21:02

## 2017-10-16 RX ADMIN — CLOPIDOGREL BISULFATE SCH MG: 75 TABLET, FILM COATED ORAL at 08:06

## 2017-10-16 NOTE — ECHOCARDIOGRAM REPORT
*NOTICE TO RECEIVING PARTY AGENCY**  This information is strictly Confidential and protected under 
[place] law.  [place] law prohibits you from making any further disclosure of this 
information unless further disclosure is expressly permitted by the written consent of the person to 
whom it pertains or is authorized by law.  A general authorization for the release of medical or 
other information is not sufficient for this purpose.  Hospital accepts no responsibility if the 
information is made available to any other person, INCLUDING THE PATIENT.



Interpretation Summary

  *  Name: LISHA MARTINEZ  Study Date: 10/15/2017 02:23 PM  BP: 190/73 mmHg

  *  MRN: [id number]  Patient Location: .[facility]\S\N281\S\2  HR: 72

  *  : 1945 (M/d/yyyy)  Gender: Male  Height: 70 in

  *  Age: 72 yrs  Ethnicity: [place]  Weight: 253 lb

  *  Ordering Physician: Julita Sky

  *  Referring Physician: Self, Referred

  *  Performed By: Lyla Reyes RDCS

  *  Accession# [id number]  Account# [id number]

  *  Reason For Study: Chest Pain

  *  BSA: 2.3 m2

  *  -- Conclusions --

  *  Technically limited study.

  *  Left ventricular systolic function is normal.

  *  Small area of akinesis involving the distal anterior wall and adjacent anteroseptum.

  *  Ejection Fraction = 55-60%.

  *  There is mild concentric left ventricular hypertrophy.

  *  There is mild right ventricular hypertrophy.

  *  Diastolic dysfunction, Grade II (pseudonormalization pattern).

  *  No obvious valvular pathology.

Procedure Details

  *  A complete two-dimensional transthoracic echocardiogram was performed (2D, M-mode, Doppler and 
color flow Doppler).

  *  The study was technically difficult.

  *  The study was technically difficult, but visualization was adequate with the administration of 
Definity ultrasound contrast.

  *  A contrast injection of Definity was performed to improve assessment of LV function.

  *  Contrast was injected into an intravenous site in the left arm.

  *  One vial of Definity ultrasound contrast was diluted in normal saline to a total volume of 10 
ml.  A total of '3' ml of solution was administered during imaging.

  *  Lot # [id number] of Definity utilized for procedure.

  *  Expiration date .

  *  The attending nurse who injected the contrast agent was Kim Burton RN.

Left Ventricle

  *  The left ventricle is normal in size.

  *  There is mild concentric left ventricular hypertrophy.

  *  Ejection Fraction = 55-60%.

  *  Left ventricular systolic function is normal.

  *  Small area of akinesis involving the distal anterior wall and adjacent anteroseptum.

Right Ventricle

  *  There is mild right ventricular hypertrophy.

  *  The right ventricular systolic function is normal.

Atria

  *  The left atrium is mildly dilated.

  *  Right atrium not well visualized.

Mitral Valve

  *  The mitral valve is grossly normal.

  *  No significant mitral valve stenosis.

  *  Significant mitral regurgitation is absent.

Tricuspid Valve

  *  The tricuspid valve is not well visualized.

  *  No significant tricuspid stenosis.

  *  Significant tricuspid regurgitation is absent.

Aortic Valve

  *  The aortic valve is not well visualized.

  *  The aortic valve opens well.

  *  No hemodynamically significant valvular aortic stenosis.

  *  There is no significant aortic regurgitation.

Pulmonic Valve

  *  The pulmonary valve is not well seen, but the Doppler examination is normal without significant 
regurgitation or stenosis.

Great Vessels

  *  The aortic root is normal size.

  *  The pulmonary is not well visualized.

Pericardium/Pleural

  *  There is no pericardial effusion.

Great Vessels

  *  Inferior vena cava not well visualized.

Left Ventricular Diastolic Function

  *  Diastolic dysfunction, Grade II (pseudonormalization pattern).



MMode 2D Measurements and Calculations

IVSd 1.4 cm

IVSs 1.9 cm



LVIDd 5.5 cm

LVIDs 3.9 cm

LVPWd 1.5 cm

LVPWs 2.1 cm



IVS/LVPW 0.97 

FS 29.9 %

EDV(Teich) 148.4 ml

ESV(Teich) 64.6 ml

EF(Teich) 56.5 %



EDV(cubed) 167.8 ml

ESV(cubed) 57.8 ml

EF(cubed) 65.5 %

% IVS thick 32.2 %

% LVPW thick 44.6 %





LV mass(C)d 361.6 grams

LV mass(C)dI 156.8 grams/m\S\2

LV mass(C)s 365.9 grams

LV mass(C)sI 158.6 grams/m\S\2



SV(Teich) 83.8 ml

SI(Teich) 36.3 ml/m\S\2

SV(cubed) 110.0 ml

SI(cubed) 47.7 ml/m\S\2



Ao root diam 3.5 cm

Ao root area 9.8 cm\S\2

ACS 2.0 cm

LA dimension 4.9 cm



LA/Ao 1.4 





LVAd ap4 32.9 cm\S\2

LVLd ap4 8.5 cm

EDV(MOD-sp4) 106.6 ml

EDV(sp4-el) 107.7 ml

LVAs ap4 16.2 cm\S\2

LVLs ap4 7.8 cm

ESV(MOD-sp4) 27.9 ml

ESV(sp4-el) 28.5 ml

EF(MOD-sp4) 73.8 %

EF(sp4-el) 73.5 %



LVAd ap2 35.1 cm\S\2

LVLd ap2 8.2 cm

EDV(MOD-sp2) 122.8 ml

EDV(sp2-el) 127.3 ml

LVAs ap2 20.3 cm\S\2

LVLs ap2 7.4 cm

ESV(MOD-sp2) 47.1 ml

ESV(sp2-el) 47.6 ml

EF(MOD-sp2) 61.6 %

EF(sp2-el) 62.6 %



LVLd %diff -3.38 %

EDV(MOD-bp) 114.2 ml

LVLs %diff -5.76 %

ESV(MOD-bp) 36.9 ml

EF(MOD-bp) 67.7 %



SV(MOD-sp4) 78.7 ml

SI(MOD-sp4) 34.1 ml/m\S\2





SV(MOD-sp2) 75.7 ml

SI(MOD-sp2) 32.8 ml/m\S\2



SV(MOD-bp) 77.3 ml

SI(MOD-bp) 33.5 ml/m\S\2



SV(sp4-el) 79.1 ml

SI(sp4-el) 34.3 ml/m\S\2



SV(sp2-el) 79.7 ml

SI(sp2-el) 34.6 ml/m\S\2







Doppler Measurements and Calculations

MV E max boaz 107.8 cm/sec

MV A max boaz 82.6 cm/sec



MV E/A 1.3 



MV P1/2t max boaz 111.3 cm/sec

MV P1/2t 76.5 msec

MVA(P1/2t) 2.9 cm\S\2

MV dec slope 426.0 cm/sec\S\2

MV dec time 0.14 sec



Ao V2 max 118.2 cm/sec

Ao max PG 5.6 mmHg

Ao max PG (full) 3.5 mmHg





LV V1 max PG 2.1 mmHg



LV V1 max 72.6 cm/sec



PA V2 max 88.9 cm/sec

PA max PG 3.2 mmHg



TR max boaz 251.3 cm/sec

## 2017-10-16 NOTE — DIAGNOSTIC IMAGING REPORT
CHEST 2 VIEWS ROUTINE



HISTORY:      shortness of breath; r/o pna/congestion



COMPARISON: Chest 10/15/2017.



FINDINGS: No pneumothorax. No pleural effusions. The heart remains mildly

enlarged. A few scattered linear densities within the right midlung zone and the

left lung base suggesting scarring or atelectasis. Left sided

pacemaker/defibrillator. Poststernotomy changes. No evidence for pulmonary

edema. Left lateral sixth through eighth rib fractures. Focal pleural thickening

at this location likely due to the rib fractures. This may represent a small

surrounding soft tissue hematoma.



IMPRESSION:



1. Age-indeterminate lateral sixth through eighth rib fractures. No

pneumothorax. Recommend correlation for pain at this location to assess for an

acute injury.

2. Stable mild cardiomegaly.







Electronically signed by:  Derrick Estrada M.D.

10/16/2017 9:49 AM



Dictated Date/Time:  10/16/2017 9:43 AM

## 2017-10-16 NOTE — CARDIOLOGY CONSULTATION
Cardiology Consultation


Date of Consultation:  Oct 16, 2017


Requesting Physician:  Austen


Attending Cardiologist:  Candelaria


 (Lombardo,Mark, PA-C)





History of Present Illness


Mr. Valdes is a 72 year old male who is being seen at the request of Dr. Boyce. Reason for consultation is atrial fibrillation. Information obtained 

from review of his available records as well as interviewing the patient. The 

patient is a poor historian. Limited outpatient records are available for 

review. He notes previously being followed by Dr. Thurston and now being followed 

by "[place] in [place]." 





The patient states that he presented to the [facility] ER on 

10/15/2017 with acute on chronic dyspnea. He describes a nonproductive cough, 

orthopnea, intermittent PND, lower extremity peripheral edema, and weight gain 

over the last few weeks. Notes being treated for a URI about three weeks ago 

without much benefit. On presentation he was given 40 mg IV furosemide with 

improvement in his presenting symptoms. Review of his outpatient record reveals 

lisinopril and spironolactone were discontinued in September. 


 (Lombardo,Mark, PA-C)





Past Medical/Surgical History


Problem List:


ASCVD


History of MI in  status post PTCA


History of MI in 


Status post CABG x 2 in  with a LIMA to the LAD and a SVT to the OM. RCA 

occlusion with collateral filling


Status post single chamber ICD (Manufacture unknown) following a cardiac arrest 


Presumed ischemic cardiomyopathy


Type II diabetes mellitus with CKD, neuropathy, and retinopathy


Stage IV chronic kidney disease


Carotid artery disease and peripheral vascular disease, status post right 

femoropopliteal bypass grafting. Followed in [place]


Right foot osteomyelitis status post right foot metatarsal amputation per 

documentation


Severe sleep apnea. Unable to tolerate CPAP. Utilizing nocturnal oxygen 

supplementation


Hypertension


Dyslipidemia


Chronic back pain


Spinal stenosis


GERD


Hiatal hernia


Cholecystectomy


Depression


Hypothyroidism


 (Lombardo,Mark, PA-C)





Family History


Family History: Mother  with CHF in her last 60's. Father  with an CVA 

in his early 70's. One sister and two brothers, all with CAD.


 (Lombardo,Mark, PA-C)





Social History


Social History: Nonsmoker. Chews snuff. No alcohol. Denies illegal drug use. 

Disabled, previously performing construction work. 


 (Lombardo,Varun, PA-C)





Review Of Systems


General: + Weight gain. Denies fever or chills.


HEENT: No headache. No amaurosis fugax. 


Cardiovascular: See above. 


Pulmonary: + Cough. + Chest congestion. + Nocturnal oxygen supplementation. 

Unable to tolerate CPAP. 


Gastrointestinal: No nausea, vomiting, or diarrhea. 


Skin: No rash. 


Musculoskeletal: Chronic back pain. Diffuse arthritis pain. 


Neurological: Denies history of TIA, CVA, or seizures


Complete review of systems is as stated above, negative, or noncontributory. 


 (Lombardo,Mark, PA-C)





Allergies


Coded Allergies:  


     Moxifloxacin (Unverified  Adverse Reaction, Severe, "DEPLETED POTASSIUM 

AND MAGNESIUM. CARDIAC ARREST" PT WIFE, 10/15/17)





Medications





Reported Home Medications








 Medications  Dose


 Route/Sig


 Max Daily Dose Days Date Category Dose


Instructions


 


 Nitrostat


  (Nitroglycerin)


 0.4 Mg Tab  0.4 Mg


 UT PRN


    10/15/17 Reported 


 


 Fish Oil 1200 mg


  (Omega-3 Fatty


 Acids) 1 Cap Cap  2 Cap


 PO BID


    10/15/17 Reported 


 


 Multivitamin


  (Multivitamins)


 Tab  1 Tab


 PO DAILY


    10/15/17 Reported 


 


 Afrin 0.05% Nasal


 Spray


  (Oxymetazoline


 Hcl) 1 Btl Spray  2 Sprays


 ESTEVAN BID PRN


    10/15/17 Reported 


 


 Mag-Ox (Magnesium


 Oxide) 400 Mg Tab  400 Mg


 PO BID


    10/15/17 Reported 


 


 Oxygen  Gas  2 Liters


 NA


    10/15/17 Reported 


 


 Vitamin D3


  (Cholecalciferol)


 1,000 Unit Tab  2 Tab


 PO DAILY


    10/15/17 Reported 


 


 Aspirin Ec


  (Aspirin) 81 Mg


 Tab  81 Mg


 PO DAILY


    10/15/17 Reported 


 


 Plavix


  (Clopidogrel


 Bisulfate) 75 Mg


 Tab  75 Mg


 PO DAILY


    10/15/17 Reported 


 


 Toprol-Xl


  (Metoprolol


 Succinate) 25 Mg


 Tabcr  1.5 Tab


 PO HS


    10/15/17 Reported 


 


 Novolog (Insulin


 Aspart) 100


 Units/Ml Inj  29 Units


 SC AC


    10/15/17 Reported  PLUS ADDITIONAL 2 UNITS FOR EACH 50 ABOVE 150


 


 Zyloprim


  (Allopurinol) 300


 Mg Tab  300 Mg


 PO DAILY


    10/15/17 Reported 


 


 Tofranil


  (Imipramine Hcl)


 25 Mg Tab  2 Tab


 PO HS


    10/15/17 Reported 


 


 Tofranil


  (Imipramine Hcl)


 25 Mg Tab  25 Mg


 PO QAM


    10/15/17 Reported 


 


 Lantus Solostar


  (Insulin


 Glargine) 100


 Unit/Ml Inj  52 Units


 SC AMPM


    10/15/17 Reported 


 


 Levothyroxine


 Sodium 50 Mcg Tab  1 Tab


 PO DAILYBB


    10/15/17 Reported 


 


 Crestor


  (Rosuvastatin


 Calcium) 40 Mg Tab  40 Mg


 PO DAILY


    10/15/17 Reported 


 


 Bupropion Hcl Sr


  (Bupropion Hcl


  (Smoking [place])


 150 Mg Tab  1 Tab


 PO BID


    10/15/17 Reported 


 


 Tricor


  (Fenofibrate) 200


 Mg Cap  200 Mg


 PO DAILY


    10/15/17 Reported 


 


 Norvasc


  (Amlodipine


 Besylate) 10 Mg


 Tab  10 Mg


 PO DAILY


    10/15/17 Reported 


 


 Lyrica


  (Pregabalin) 150


 Mg Cap  150 Mg


 PO BID


    10/15/17 Reported 


 


 Protonix


  (Pantoprazole


 Sodium) 40 Mg Tab  40 Mg


 PO DAILY


    10/15/17 Reported 


 


 Cymbalta


  (Duloxetine HCl)


 30 Mg Cap  1 Cap


 PO HS


    10/15/17 Reported 


 


 Cymbalta


  (Duloxetine Hcl)


 60 Mg Cap  60 Mg


 PO DAILY


    10/15/17 Reported 








 (Lombardo,Mark, PA-C)





Physical Exam


Vital Signs (Last 8hrs):





Last 8 Hrs








  Date Time  Temp Pulse Resp B/P (MAP) Pulse Ox O2 Delivery O2 Flow Rate FiO2


 


10/16/17 10:59  84 15  96 Nasal Cannula 2.0 


 


10/16/17 09:33      Nasal Cannula 2.0 


 


10/16/17 07:07  83 16  93 Room Air  


 


10/16/17 07:01 36.7 95 20 129/73 (91) 94  2.0 


 


10/16/17 04:08 37.1 67 18 138/76 (96) 95  2.0 


 


10/16/17 04:00      Nasal Cannula 2.0 








General Appearance: Alert and Oriented x3. NAD. 


HEENT: Normocephalic Atraumatic. PER. EOMI, conjunctiva and sclera clear


Neck:  Bilateral carotid bruits. No overt JVD (examined in the beside chair): 


Respiratory: Diminished but clear to auscultation. No w/r/r. 


Cardiovascular: Somewhat distant heart sounds. Slightly irregular in the 60's. 

I would not appreciate a murmur. 


Abdomen: +BS. Soft. Nontender. 


Extremities: 1+ edema. No cyanosis. No pulses. 


Neuro:  No focal deficits. 


Psychiatric: Somewhat of a flat affect. 


 (Lombardo,Mark, PA-C)





Data





Last 24 Hours








Test


  10/15/17


12:06 10/15/17


16:25 10/15/17


16:41 10/15/17


20:02


 


Bedside Glucose 164 mg/dl   123 mg/dl  129 mg/dl 


 


Total Creatine Kinase  162 U/L   


 


Creatine Kinase MB  3.0 ng/ml   


 


Creatine Kinase MB Ratio  1.9   


 


Troponin I  < 0.015 ng/ml   


 


Test


  10/16/17


00:21 10/16/17


06:08 10/16/17


07:25 


 


 


Total Creatine Kinase 182 U/L    


 


Creatine Kinase MB 2.5 ng/ml    


 


Creatine Kinase MB Ratio 1.4    


 


Troponin I < 0.015 ng/ml    


 


White Blood Count  4.24 K/uL   


 


Red Blood Count  4.14 M/uL   


 


Hemoglobin  12.1 g/dL   


 


Hematocrit  35.8 %   


 


Mean Corpuscular Volume  86.5 fL   


 


Mean Corpuscular Hemoglobin  29.2 pg   


 


Mean Corpuscular Hemoglobin


Concent 


  33.8 g/dl 


  


  


 


 


RDW Standard Deviation  44.0 fL   


 


RDW Coefficient of Variation  14.0 %   


 


Platelet Count  164 K/uL   


 


Mean Platelet Volume  10.7 fL   


 


Sodium Level  142 mmol/L   


 


Potassium Level  3.7 mmol/L   


 


Chloride Level  106 mmol/L   


 


Carbon Dioxide Level  29 mmol/L   


 


Anion Gap  7.0 mmol/L   


 


Blood Urea Nitrogen  24 mg/dl   


 


Creatinine  2.27 mg/dl   


 


Est Creatinine Clear Calc


Drug Dose 


  37.8 ml/min 


  


  


 


 


Estimated GFR (


American) 


  32.2 


  


  


 


 


Estimated GFR (Non-


American 


  27.8 


  


  


 


 


BUN/Creatinine Ratio  10.4   


 


Random Glucose  115 mg/dl   


 


Estimated Average Glucose  154 mg/dl   


 


Hemoglobin A1c  7.0 %   


 


Calcium Level  7.6 mg/dl   


 


Magnesium Level  2.5 mg/dl   


 


Triglycerides Level  373 mg/dl   


 


Cholesterol Level  133 mg/dl   


 


HDL Cholesterol  33 mg/dl   


 


LDL Cholesterol, Calculated  25 mg/dl   


 


VLDL Cholesterol, Calculated  75 mg/dl   


 


Cholesterol/HDL Ratio  4.0   


 


Thyroid Stimulating Hormone


(TSH) 


  2.070 uIu/ml 


  


  


 


 


Bedside Glucose   118 mg/dl  








Nuclear stress testing in May 2017 ([place]) was reportedly negative for 

ischemia.





EKG dated and timed 15-OCT-2017 @ 06:47:11: Suspected ectopic atrial rhythm 

with 1st degree A-V block. Nonspecific T wave abnormality. 





EKG dated 10/16/2017 at 00:00:57: Probable NSR at 69 bpm with a first degree AV 

block and premature atrial complexes. Nonspecific ST-T wave abnormality. 





EKG dated 10/16/2017 at 07:05:52 reveals probable sinus rhythm with a first 

degree AV block, nonspecific T wave abnormality 





The EKG's above are of limited quality. 





Telemetry: Appears to be sinus with a long first degree AV block. PAC's. There 

are a couple of ventricular paced beats at 40 bpm.





CXR: Single lead ICD





TTE: Pending


 (Lombardo,Mark, PA-C)





Assessment & Plan


Markedly complex 72 year old male presenting with signs and symptoms appearing 

to be secondary to acute decompensated systolic congestive heart failure with 

patient responding favorably to 40 mg IV furosemide on presentation. 





Cardiology consultation requested due to concern for asymptomatic atrial 

fibrillation with a controlled ventricular response. Review of the available EKG

's, albeit somewhat poor in quality, as well as telemetry reveal what appears 

to be sinus rhythm with a long first degree AV block and possibly ectopic 

atrial rhythms with rare ventricular pacemaker activity at 40 bpm. I am not 

convinced he has atrial fibrillation at this point. 





RECOMMENDATIONS/PLAN:


Obtain records from his outpatient cardiologist in [place], [place]


Await TTE interpretation


Device interrogation once manufacture known. 


Continue IV diuresis another day.


Reduce QT prolonging agents if possible.


 (Lombardo,Mark, PA-C)


CARDIOLOGY ATTENDING ADDENDUM: 


The patient was seen and personally examined.


Agree with Mark Lombardo, PA-C's findings and plans as documented above.


Complex patient without much HX or Records.  Will review records.


 (Favian Gaona, )

## 2017-10-16 NOTE — NEPHROLOGY CONSULTATION
DATE OF CONSULTATION:  10/16/2017

 

HISTORY OF PRESENT ILLNESS:  This is a 72-year-old male who presented with

shortness of breath, who has underlying CKD stage IV with baseline creatinine

in the low 2s and the creatinine continues to be in low 2s.  I was asked to

evaluate the patient secondary to my partner Dr. Alcaraz evaluating the

patient at the end of September for acute kidney injury on CKD stage IV.

 

Past medical history includes diabetes diagnosed in 2003, on insulin, and has

diabetic retinopathy.  The patient also with severe sleep apnea, on oxygen at

night.  Chews tobacco and does have heart disease with first MI in 1986 and

VFib arrest in 2011 in the setting of severe hypokalemia, peripheral vascular

disease, status post right leg bypass, spinal stenosis leading to frequent

falls.  Creatinine runs in the low 2s since 2012.  The patient does have

intermittent admissions for shortness of breath.  The patient was in the ER

on 09/15/2017 with sob and the patient refused admission, creatinine was 3.  
The patient

had ongoing nausea and creatinine was up to 4.  With the creatinine

worsening, Aldactone and lisinopril were stopped and repeat creatinine has

trended down.  Renal ultrasound was negative for obstruction.  Continued to

hold the Aldactone and lisinopril and follow up with Dr. Alcaraz as an

outpatient in December.  Renal ultrasound on 09/26/2017 showed a right kidney

of 13.5 cm and left kidney of 12.3 cm, essentially normal renal ultrasound. 

The patient presented yesterday with shortness of breath and throat pain for

the past 3 weeks.  Last evening while lying down, he developed worsening

shortness of breath.

 

PAST MEDICAL HISTORY:  Type 2 diabetes, hypertension, coronary artery disease

with history of cardiac arrest, peripheral vascular disease, obstructive

sleep apnea, on nocturnal oxygen, hypothyroidism, depression.

 

PAST SURGICAL HISTORY:  Defibrillator placement, CABG x2, right foot

transmetatarsal amputation.

 

SOCIAL HISTORY:  Chews tobacco.  No alcohol, no drugs.   and lives at

home with wife.



FAMILY HISTORY: No renal disease in family

 

CURRENT MEDICATIONS:  Norvasc 10 mg a day, Plavix 75 mg daily, Cymbalta 60 mg

daily, imipramine 25 mg daily, multivitamin daily, Protonix 40 mg daily,

Crestor 40 mg daily, Synthroid 50 mcg daily, doxycycline 100 mg p.o. b.i.d.,

Lantus 45 units subcu q. 12, Cymbalta 30 mg at night, imipramine 50 mg at

night, magnesium oxide 400 mg p.o. b.i.d., Toprol-XL 37.5 at night, Lyrica

150 mg p.o. b.i.d., Wellbutrin 150 mg p.o. b.i.d., heparin 5000 units subcu

q. 8, normal saline at 80 mL an hour.

 

REVIEW OF SYSTEMS:  Positive shortness of breath, positive cough, positive

swelling, positive depression.  No nausea or vomiting.  No headaches.  No

blurry vision.  No diarrhea, no constipation.  All other review of systems

otherwise negative.

 

PHYSICAL EXAMINATION:

VITAL SIGNS:  Temperature 37.1, pulse 67, respiratory rate 18, blood pressure

138/76, satting 95% on 2 liters.

GENERAL:  Awake, alert, oriented x3, obese.

HEENT:  Moist mucous membranes.

NECK:  Supple.

PULMONARY:  Clear to auscultation.

CARDIAC:  Regular rate and rhythm.

ABDOMEN:  Bowel sounds positive, soft, nontender.

EXTREMITIES:  No significant clubbing, cyanosis or edema.  Does have a right

transmetatarsal amputation.

NEUROLOGIC:  Nonfocal.

DERMATOLOGIC:  No rash or ulcers noted.

 

Chest x-ray shows stable mild cardiomegaly.

 

LABORATORY DATA:  Labs are pending for this morning.  Troponins negative x2. 

Sodium level is 140, potassium 3.6, chloride is 107, bicarb is 27, BUN is 22,

creatinine is 2.1, glucose 149, calcium is 8.2, mag is 2.4, alkaline

phosphatase 39.  Albumin is 3.7, amylase 19, lipase 104.  White count is 4,

H&H 12 and 35, platelet count is 164.

 

IMPRESSION AND PLAN:

1.  Chronic kidney disease stage IV in the setting of heart disease,

hypertension, diabetes and chews tobacco.  No NSAIDs.  Creatinine did acutely

worsen in the middle of September in the setting of Aldactone and lisinopril.

 With poor appetite and worsening wheezing, likely became volume depleted. 

Renal ultrasound was unimpressive, creatinine back down to baseline and has

been in the low 2s since 2012.  Recommended by my partner to not resume the

lisinopril and Aldactone for now and will concur with her recommendations. 

The patient currently on IV fluids which I feel the patient currently does

not need.  We will stop the IV fluids.

2.  Hypertension.  Blood pressure at midnight as well as early this morning

is well controlled on the current blood pressure meds.  No changes from that

standpoint.

3.  Shortness of breath.  The patient is on doxycycline.  Chest x-ray is

unimpressive.  The patient's shortness of breath likely multifactorial with

previous coronary artery disease and obstructive sleep apnea.  I do not feel

the patient is in congestive heart failure and the patient's breathing is

improved this morning.  We will defer to the primary hospitalist.

4.  Overall, the patient's chronic kidney disease stage IV is at baseline.  I

feel creatinine did temporarily worsen in the setting of lisinopril and

Aldactone and poor appetite with worsening pulmonary status.  I will concur

with my partner Dr. Alcaraz who has been following him in [place] and

continue to avoid the lisinopril and Aldactone.  Has a scheduled followup

appointment with Dr. Alcaraz in December, which I recommend he continue to

keep.  From a renal standpoint, okay to discharge once medically cleared.  My

recommendation currently though is to stop the IV fluids.  Given his poor

cardiac function and shortness of breath at baseline, I do not necessarily

want to add congestive heart failure component at this time.

 

Appreciate consultation.

 

 

 

JOSEPH

## 2017-10-16 NOTE — PROGRESS NOTE
Subjective


Date of Service:


Oct 16, 2017.


Subjective


Pt evaluation today including:  conversation w/ patient, physical exam, lab 

review, review of studies, review of inpatient medication list


Saw/examined the patient in room 281


He's doing better today


breathing improved today


very weak when standing up





Problem List


Medical Problems:


(1) Hypertension


Status: Acute  





(2) Hypoxia


Status: Acute  





(3) SOB (shortness of breath)


Status: Acute  











Review of Systems


Constitutional:  + weakness, No fever, No chills


Respiratory:  + shortness of breath (improving), + dyspnea on exertion, No cough

, No sputum, No wheezing, No dyspnea at rest, No hemoptysis


Cardiac:  No chest pain


Abdomen:  No pain, No nausea, No vomiting, No diarrhea





Medications





Current Inpatient Medications








 Medications


  (Trade)  Dose


 Ordered  Sig/Richard


 Route  Start Time


 Stop Time Status Last Admin


Dose Admin


 


 Heparin Sodium


  (Porcine)


  (Heparin Sq 5000


 Unit/0.5ml)  5,000 unit  Q8


 SQ  10/15/17 14:00


 11/14/17 13:59  10/16/17 14:10


5,000 UNIT


 


 Insulin Glargine


  (Lantus Solostar


 Pen)  45 units  Q12


 SC  10/15/17 21:00


 11/14/17 20:59  10/16/17 08:14


45 UNITS


 


 Insulin Aspart


  (novoLOG ASPART)  **SLIDING


 SCALE**


 **If C...  ACHS


 SC  10/15/17 11:00


 11/14/17 10:59  10/16/17 12:37


3 UNITS


 


 Glucose


  (Glucose 40% Gel)  15-30


 GRAMS 15


 GRAMS...  UD  PRN


 PO  10/15/17 10:45


 11/14/17 10:44   


 


 


 Glucose


  (Glucose Chew


 Tab)  4-8


 Tablets 4


 Tabl...  UD  PRN


 PO  10/15/17 10:45


 11/14/17 10:44   


 


 


 Dextrose


  (Dextrose 50%


 50ML Syringe)  25-50ML OF


 50% DW IV


 FOR...  UD  PRN


 IV  10/15/17 10:45


 11/14/17 10:44   


 


 


 Glucagon


  (Glucagon Inj)  1 mg  UD  PRN


 SQ  10/15/17 10:45


 11/14/17 10:44   


 


 


 Amlodipine


 Besylate


  (Norvasc Tab)  10 mg  DAILY


 PO  10/16/17 09:00


 11/15/17 08:59  10/16/17 08:06


10 MG


 


 Clopidogrel


 Bisulfate


  (plAVix TAB)  75 mg  DAILY


 PO  10/16/17 09:00


 11/15/17 08:59  10/16/17 08:06


75 MG


 


 Duloxetine HCl


  (Cymbalta Cap)  30 mg  HS


 PO  10/15/17 21:00


 11/14/17 20:59  10/15/17 20:48


30 MG


 


 Duloxetine HCl


  (Cymbalta Cap)  60 mg  DAILY


 PO  10/16/17 09:00


 11/15/17 08:59  10/16/17 08:07


60 MG


 


 Imipramine HCl


  (Tofranil Tab)  50 mg  HS


 PO  10/15/17 21:00


 11/14/17 20:59  10/15/17 20:46


50 MG


 


 Imipramine HCl


  (Tofranil Tab)  25 mg  QAM


 PO  10/16/17 09:00


 11/15/17 08:59  10/16/17 08:05


25 MG


 


 Levothyroxine


 Sodium


  (Synthroid Tab)  50 mcg  DAILYBB


 PO  10/16/17 06:30


 11/15/17 06:59  10/16/17 05:50


50 MCG


 


 Magnesium Oxide


  (Mag-Ox Tab)  400 mg  BID


 PO  10/15/17 21:00


 11/14/17 20:59  10/16/17 08:05


400 MG


 


 Metoprolol


 Succinate


  (Toprol Xl Tab)  37.5 mg  HS


 PO  10/15/17 21:00


 11/14/17 20:59  10/15/17 20:47


37.5 MG


 


 Multivitamins


  (Multivitamin


 Tab)  1 tab  DAILY


 PO  10/16/17 09:00


 11/15/17 08:59  10/16/17 08:07


1 TAB


 


 Pantoprazole


 Sodium


  (Protonix Tab)  40 mg  DAILY


 PO  10/16/17 09:00


 11/15/17 08:59  10/16/17 08:06


40 MG


 


 Pregabalin


  (Lyrica Cap)  150 mg  BID


 PO  10/15/17 21:00


 11/14/17 20:59  10/16/17 08:27


150 MG


 


 Rosuvastatin


 Calcium


  (Crestor Tab)  40 mg  DAILY


 PO  10/16/17 09:00


 11/15/17 08:59  10/16/17 08:07


40 MG


 


 Bupropion HCl


  (Wellbutrin-Sr


 Tab)  150 mg  BID


 PO  10/15/17 21:00


 11/14/17 20:59  10/16/17 08:07


150 MG


 


 Miscellaneous


 Information


  (Order Awaiting


 Action)  1 ea  QS


 N/A  10/15/17 16:00


 11/14/17 15:59   


 


 


 Albuterol/


 Ipratropium


  (Duoneb)  3 ml  QIDR


 INH  10/15/17 12:00


 11/14/17 11:59  10/16/17 15:13


3 ML


 


 Doxycycline


 Hyclate


  (Vibramycin Cap)  100 mg  BID@1100,2300


 PO  10/15/17 23:00


 10/17/17 10:59  10/16/17 12:29


100 MG


 


 Sodium Chloride


  (Ocean Nasal


 Spray)  1 sprays  PRN  PRN


 NA  10/15/17 16:45


 11/14/17 16:44   


 


 


 Promethazine HCl


 12.5 mg/Sodium


 Chloride  50.5 ml @ 


 204 mls/hr  Q6H  PRN


 IV  10/15/17 22:15


 11/14/17 22:14  10/15/17 22:29


204 MLS/HR











Objective


Vital Signs











  Date Time  Temp Pulse Resp B/P (MAP) Pulse Ox O2 Delivery O2 Flow Rate FiO2


 


10/16/17 15:42 36.6 61 20 134/83 (100) 96  2.0 


 


10/16/17 15:15  78 16  97 Nasal Cannula 2.0 


 


10/16/17 12:01 36.5 60 20 127/67 (87) 97 Nasal Cannula 2.0 


 


10/16/17 12:00      Nasal Cannula 2.0 


 


10/16/17 10:59  84 15  96 Nasal Cannula 2.0 


 


10/16/17 09:33      Nasal Cannula 2.0 


 


10/16/17 07:07  83 16  93 Room Air  


 


10/16/17 07:01 36.7 95 20 129/73 (91) 94  2.0 


 


10/16/17 04:08 37.1 67 18 138/76 (96) 95  2.0 


 


10/16/17 04:00      Nasal Cannula 2.0 


 


10/16/17 00:00 37.0 71 18 134/68 (90) 96  2.0 


 


10/16/17 00:00      Nasal Cannula 2.0 


 


10/15/17 20:45  76  163/85 (111)    


 


10/15/17 20:00      Nasal Cannula 2.0 


 


10/15/17 19:29 36.6 76 20 175/87 (116) 99 Room Air  


 


10/15/17 19:08  86 16  93 Room Air  











Physical Exam


General Appearance:  no apparent distress, + obese


Respiratory/Chest:  no respiratory distress, no accessory muscle use, + 

decreased breath sounds


Cardiovascular:  regular rate, rhythm, no edema, no murmur


Abdomen:  normal bowel sounds, non tender, soft


Extremities:  normal inspection, no pedal edema





Laboratory Results





Last 24 Hours








Test


  10/15/17


20:02 10/16/17


00:21 10/16/17


06:08 10/16/17


07:25


 


Bedside Glucose 129 mg/dl    118 mg/dl 


 


Total Creatine Kinase  182 U/L   


 


Creatine Kinase MB  2.5 ng/ml   


 


Creatine Kinase MB Ratio  1.4   


 


Troponin I  < 0.015 ng/ml   


 


White Blood Count   4.24 K/uL  


 


Red Blood Count   4.14 M/uL  


 


Hemoglobin   12.1 g/dL  


 


Hematocrit   35.8 %  


 


Mean Corpuscular Volume   86.5 fL  


 


Mean Corpuscular Hemoglobin   29.2 pg  


 


Mean Corpuscular Hemoglobin


Concent 


  


  33.8 g/dl 


  


 


 


RDW Standard Deviation   44.0 fL  


 


RDW Coefficient of Variation   14.0 %  


 


Platelet Count   164 K/uL  


 


Mean Platelet Volume   10.7 fL  


 


Sodium Level   142 mmol/L  


 


Potassium Level   3.7 mmol/L  


 


Chloride Level   106 mmol/L  


 


Carbon Dioxide Level   29 mmol/L  


 


Anion Gap   7.0 mmol/L  


 


Blood Urea Nitrogen   24 mg/dl  


 


Creatinine   2.27 mg/dl  


 


Est Creatinine Clear Calc


Drug Dose 


  


  37.8 ml/min 


  


 


 


Estimated GFR (


American) 


  


  32.2 


  


 


 


Estimated GFR (Non-


American 


  


  27.8 


  


 


 


BUN/Creatinine Ratio   10.4  


 


Random Glucose   115 mg/dl  


 


Estimated Average Glucose   154 mg/dl  


 


Hemoglobin A1c   7.0 %  


 


Calcium Level   7.6 mg/dl  


 


Magnesium Level   2.5 mg/dl  


 


Triglycerides Level   373 mg/dl  


 


Cholesterol Level   133 mg/dl  


 


HDL Cholesterol   33 mg/dl  


 


LDL Cholesterol, Calculated   25 mg/dl  


 


VLDL Cholesterol, Calculated   75 mg/dl  


 


Cholesterol/HDL Ratio   4.0  


 


Thyroid Stimulating Hormone


(TSH) 


  


  2.070 uIu/ml 


  


 


 


Test


  10/16/17


11:48 10/16/17


16:20 


  


 


 


Bedside Glucose 119 mg/dl  119 mg/dl   











Assessment and Plan


This is a 72 year old male with a PMH of CAD s/p CABGx2, Hx. of Cardiac Arrest 

and V-Fib s/p AICD placement, insulin dependent DM2 with complications 

including peripheral vascular disease s/p R foot transmetatarsal amputation, 

CKD stage IV, SABRA on nocturnal O2, Hypothyroidism, Mood Disorder with 

depression - presents with shortness of breath





Shortness of Breath


10/16


Possibly Diastolic CHF?


given another dose of IV Lasix as per cardiology


echo performed showing a Grade II diastolic dysfunction with preserved EF


continue doxycycline


ordered PT/OT





10/15


unsure of the cause of SOB; possibly obesity-hypoventilation, worsening SABRA, 

bronchitis, CHF, or ACS


will check an echo; was given Lasix in the ED


started on Doxycycline


will monitor in tele, trend cardiac enzymes


recheck CXR in AM


nocturnal pulse ox


nocturnal O2 and intermittent O2 throughout the day


nebs PRN





Insulin Dependent DM2


patient is on a high dose of insulin, takes 52 units twice daily of Lantus as 

well as a sliding scale of Novolog


will check an updated Ha1c


Start with 45 units Lantus BID and work our way up


monitor BSGs AC and HS





HTN


uncontrolled


BP > 180/100 on admission


patient did not take morning medications


given Hydralazine in the ED


gave dose of amlodipine, monitor BP and we will adjust meds accordingly





CKD stage IV


patient states that he was to have further kidney w/up


creatinine at one point went up to >4


currently creatinine is down to 2.1


as per family request, would like to see nephrology


consulted nephrology, received one dose of Lasix in the ED





Hx. of CAD s/p CABGx2


unsure if this is atypical ACS related shortness of breath


therefore, monitor in tele - trend cardiac enzymes


check an echo


continue current medications





Hx. of cardiac arrest and V-Fib s/p AICD





SABRA


did not tolerate CPAP


uses 2L O2 nocturnally


check nocturnal pulse ox as SOB is worse at night





Hypothyroidism


continue current dose of Synthroid





Mood Disorder and Depression


continue current medications





DVT ppx


subq heparin





FULL CODE

## 2017-10-17 VITALS
HEART RATE: 70 BPM | DIASTOLIC BLOOD PRESSURE: 75 MMHG | SYSTOLIC BLOOD PRESSURE: 168 MMHG | TEMPERATURE: 98.42 F | OXYGEN SATURATION: 95 %

## 2017-10-17 VITALS
DIASTOLIC BLOOD PRESSURE: 65 MMHG | SYSTOLIC BLOOD PRESSURE: 130 MMHG | TEMPERATURE: 97.52 F | OXYGEN SATURATION: 95 % | HEART RATE: 65 BPM

## 2017-10-17 VITALS
HEART RATE: 78 BPM | DIASTOLIC BLOOD PRESSURE: 79 MMHG | OXYGEN SATURATION: 94 % | TEMPERATURE: 98.24 F | SYSTOLIC BLOOD PRESSURE: 150 MMHG

## 2017-10-17 VITALS
SYSTOLIC BLOOD PRESSURE: 150 MMHG | HEART RATE: 70 BPM | DIASTOLIC BLOOD PRESSURE: 66 MMHG | TEMPERATURE: 97.88 F | OXYGEN SATURATION: 95 %

## 2017-10-17 VITALS — OXYGEN SATURATION: 91 %

## 2017-10-17 VITALS
OXYGEN SATURATION: 93 % | HEART RATE: 62 BPM | SYSTOLIC BLOOD PRESSURE: 146 MMHG | DIASTOLIC BLOOD PRESSURE: 73 MMHG | TEMPERATURE: 97.88 F

## 2017-10-17 VITALS
DIASTOLIC BLOOD PRESSURE: 84 MMHG | HEART RATE: 70 BPM | TEMPERATURE: 97.7 F | SYSTOLIC BLOOD PRESSURE: 149 MMHG | OXYGEN SATURATION: 96 %

## 2017-10-17 VITALS — OXYGEN SATURATION: 95 %

## 2017-10-17 VITALS
TEMPERATURE: 98.06 F | OXYGEN SATURATION: 95 % | SYSTOLIC BLOOD PRESSURE: 172 MMHG | DIASTOLIC BLOOD PRESSURE: 78 MMHG | HEART RATE: 65 BPM

## 2017-10-17 VITALS — HEART RATE: 65 BPM | OXYGEN SATURATION: 94 %

## 2017-10-17 VITALS — OXYGEN SATURATION: 94 %

## 2017-10-17 VITALS — OXYGEN SATURATION: 97 %

## 2017-10-17 LAB
ANION GAP SERPL CALC-SCNC: 6 MMOL/L (ref 3–11)
BUN SERPL-MCNC: 30 MG/DL (ref 7–18)
BUN/CREAT SERPL: 11.8 (ref 10–20)
CALCIUM SERPL-MCNC: 8 MG/DL (ref 8.5–10.1)
CHLORIDE SERPL-SCNC: 108 MMOL/L (ref 98–107)
CO2 SERPL-SCNC: 29 MMOL/L (ref 21–32)
CREAT CL PREDICTED SERPL C-G-VRATE: 33.6 ML/MIN
CREAT SERPL-MCNC: 2.56 MG/DL (ref 0.6–1.4)
EOSINOPHIL NFR BLD AUTO: 168 K/UL (ref 130–400)
GLUCOSE SERPL-MCNC: 72 MG/DL (ref 70–99)
HCT VFR BLD CALC: 37 % (ref 42–52)
MCH RBC QN AUTO: 28.5 PG (ref 25–34)
MCHC RBC AUTO-ENTMCNC: 33 G/DL (ref 32–36)
MCV RBC AUTO: 86.4 FL (ref 80–100)
PMV BLD AUTO: 10.3 FL (ref 7.4–10.4)
POTASSIUM SERPL-SCNC: 3.4 MMOL/L (ref 3.5–5.1)
RBC # BLD AUTO: 4.28 M/UL (ref 4.7–6.1)
SODIUM SERPL-SCNC: 143 MMOL/L (ref 136–145)
WBC # BLD AUTO: 3.92 K/UL (ref 4.8–10.8)

## 2017-10-17 RX ADMIN — ISOSORBIDE DINITRATE SCH MG: 5 TABLET ORAL at 16:24

## 2017-10-17 RX ADMIN — Medication SCH MG: at 20:32

## 2017-10-17 RX ADMIN — Medication SCH MG: at 08:30

## 2017-10-17 RX ADMIN — HEPARIN SODIUM SCH UNIT: 10000 INJECTION, SOLUTION INTRAVENOUS; SUBCUTANEOUS at 20:29

## 2017-10-17 RX ADMIN — PANTOPRAZOLE SCH MG: 40 TABLET, DELAYED RELEASE ORAL at 08:31

## 2017-10-17 RX ADMIN — PREGABALIN SCH MG: 150 CAPSULE ORAL at 20:30

## 2017-10-17 RX ADMIN — BUPROPION HYDROCHLORIDE SCH MG: 150 TABLET, EXTENDED RELEASE ORAL at 20:31

## 2017-10-17 RX ADMIN — IPRATROPIUM BROMIDE AND ALBUTEROL SCH PUFFS: 20; 100 SPRAY, METERED RESPIRATORY (INHALATION) at 20:32

## 2017-10-17 RX ADMIN — INSULIN ASPART SCH UNITS: 100 INJECTION, SOLUTION INTRAVENOUS; SUBCUTANEOUS at 20:29

## 2017-10-17 RX ADMIN — INSULIN GLARGINE SCH UNITS: 100 INJECTION, SOLUTION SUBCUTANEOUS at 20:28

## 2017-10-17 RX ADMIN — ISOSORBIDE DINITRATE SCH MG: 5 TABLET ORAL at 11:55

## 2017-10-17 RX ADMIN — AMLODIPINE BESYLATE SCH MG: 5 TABLET ORAL at 08:31

## 2017-10-17 RX ADMIN — INSULIN ASPART SCH UNITS: 100 INJECTION, SOLUTION INTRAVENOUS; SUBCUTANEOUS at 11:57

## 2017-10-17 RX ADMIN — IPRATROPIUM BROMIDE AND ALBUTEROL SCH PUFFS: 20; 100 SPRAY, METERED RESPIRATORY (INHALATION) at 16:23

## 2017-10-17 RX ADMIN — LEVOTHYROXINE SODIUM SCH MCG: 50 TABLET ORAL at 05:52

## 2017-10-17 RX ADMIN — Medication SCH MG: at 20:34

## 2017-10-17 RX ADMIN — IPRATROPIUM BROMIDE AND ALBUTEROL SCH PUFFS: 20; 100 SPRAY, METERED RESPIRATORY (INHALATION) at 13:49

## 2017-10-17 RX ADMIN — INSULIN GLARGINE SCH UNITS: 100 INJECTION, SOLUTION SUBCUTANEOUS at 11:58

## 2017-10-17 RX ADMIN — ALUMINUM ZIRCONIUM TRICHLOROHYDREX GLY SCH EA: 0.2 STICK TOPICAL at 15:45

## 2017-10-17 RX ADMIN — INSULIN ASPART SCH UNITS: 100 INJECTION, SOLUTION INTRAVENOUS; SUBCUTANEOUS at 17:37

## 2017-10-17 RX ADMIN — BUPROPION HYDROCHLORIDE SCH MG: 150 TABLET, EXTENDED RELEASE ORAL at 08:31

## 2017-10-17 RX ADMIN — HEPARIN SODIUM SCH UNIT: 10000 INJECTION, SOLUTION INTRAVENOUS; SUBCUTANEOUS at 05:50

## 2017-10-17 RX ADMIN — HEPARIN SODIUM SCH UNIT: 10000 INJECTION, SOLUTION INTRAVENOUS; SUBCUTANEOUS at 13:54

## 2017-10-17 RX ADMIN — ALUMINUM ZIRCONIUM TRICHLOROHYDREX GLY SCH EA: 0.2 STICK TOPICAL at 08:00

## 2017-10-17 RX ADMIN — Medication SCH TAB: at 08:30

## 2017-10-17 RX ADMIN — METOPROLOL SUCCINATE SCH MG: 25 TABLET, EXTENDED RELEASE ORAL at 20:34

## 2017-10-17 RX ADMIN — INSULIN ASPART SCH UNITS: 100 INJECTION, SOLUTION INTRAVENOUS; SUBCUTANEOUS at 08:28

## 2017-10-17 RX ADMIN — CLOPIDOGREL BISULFATE SCH MG: 75 TABLET, FILM COATED ORAL at 08:31

## 2017-10-17 RX ADMIN — PREGABALIN SCH MG: 150 CAPSULE ORAL at 08:38

## 2017-10-17 RX ADMIN — IPRATROPIUM BROMIDE AND ALBUTEROL SULFATE SCH ML: .5; 3 SOLUTION RESPIRATORY (INHALATION) at 06:53

## 2017-10-17 RX ADMIN — DULOXETINE SCH MG: 30 CAPSULE, DELAYED RELEASE PELLETS ORAL at 20:31

## 2017-10-17 RX ADMIN — DULOXETINE SCH MG: 60 CAPSULE, DELAYED RELEASE PELLETS ORAL at 08:30

## 2017-10-17 RX ADMIN — ROSUVASTATIN CALCIUM SCH MG: 20 TABLET, FILM COATED ORAL at 08:30

## 2017-10-17 RX ADMIN — IMIPRAMINE HYDROCHLORIDE SCH MG: 25 TABLET, FILM COATED ORAL at 08:31

## 2017-10-17 NOTE — CARDIOLOGY FOLLOW-UP
Subjective


General


Date of Service:


Oct 17, 2017.


Chief Complaint:  SOB


Pt evaluation today including:  conversation w/ patient, physical exam, chart 

review, lab review, review of studies, review of inpatient medication list





History of Present Illness


Patient seen and examined. 


Feels considerably better overall. 


Dyspnea improved. 


Fluid retention improved.


No chest pain. 


No palpitations. 


Occasionally feels "jumpy inside." 





Telemetry: Sinus with a first degree AV block, PAC's, perhaps periods of Mobitz 

type I, and rare ventricular pacing. 





EKG this morning reveals sinus rhythm with a first degree AV block and 

premature supraventricular complexes. Nonspecific intra-ventricular conduction 

delay. Nonspecific T wave abnormality. QT looks much shorter than reported. 





October 15, 2017 TTE Interpretation Summary ([facility], Dr. Lawton): Technically 

limited study. Left ventricular systolic function is normal. Small area of 

akinesis involving the distal anterior wall and adjacent anteroseptum. Ejection 

Fraction = 55-60%. There is mild concentric left ventricular hypertrophy. There 

is mild right ventricular hypertrophy. Diastolic dysfunction, Grade II (

pseudonormalization pattern). No obvious valvular pathology.





Allergies


Coded Allergies:  


     Moxifloxacin (Unverified  Adverse Reaction, Severe, "DEPLETED POTASSIUM 

AND MAGNESIUM. CARDIAC ARREST" PT WIFE, 10/15/17)





Social History


Hx Tobacco Use In Past Year?:  No


Hx Alcohol Use - Type And Amou:  No


Hx Substance Use - Type And Am:  No





Problem List


Medical Problems:


(1) Hypertension


Status: Acute  





(2) Hypoxia


Status: Acute  





(3) SOB (shortness of breath)


Status: Acute  











Physical Exam


Vital Signs





Last Vital Signs Documentation








  Date Time  Temp Pulse Resp B/P (MAP) Pulse Ox O2 Delivery O2 Flow Rate FiO2


 


10/17/17 07:30     94 Nasal Cannula 2.0 


 


10/17/17 07:28 36.7 65 20 172/78 (109)    











Physical Exam


Constitutional:  


   Level of Distress:  NAD


Psychiatric:  


   Mental Status:  active & alert


   Orientation:  to time, to place, to person


   Memory:  recent memory normal, remote memory normal


Head:  normocephalic, atraumatic


Neck:  pertinent finding (Normal JVP)


Lungs:  


   Respiratory effort:  no dyspnea


   Auscultation:  no wheezing, no rales/crackles, no rhonchi, deminished air 

movement, decreased breath sounds


Cardiovascular:  


   Heart Auscultation:  no murmurs, no rubs, irregular rate rhythm


Peripheral Pulses:  


   Dorsalis Pedis Pulse:  absent on the left, absent on the right


Extremities:  no cyanosis, no clubbing, edema (Mild edema)


Neurologic:  


   Cranial Nerves:  grossly intact





Assessment and Plan


Assessment and Plan


Complex 72 year old male presenting with acute on chronic dyspnea. Evidence of 

acute decompensated diastolic congestive heart failure has improved following 

one dose of 40 mg IV furosemide. Blood pressures have improved though remain 

intermittently elevated. Cardiology consultation requested due to concern for 

asymptomatic atrial fibrillation with a controlled ventricular response however 

review of the EKG's as well as telemetry are without overt atrial fibrillation. 





RECOMMENDATIONS/PLAN:


Trial Isordil 5 mg three times per day in an attempt to aid symptoms as well as 

blood pressure. 


Utilize oral furosemide 20 mg 2-3 days per week only as needed after discharge. 


Recommend medical management, follow-up with his outpatient cardiologist in 

[place]


Increase activity as tolerated. 


CARDIOLOGY ATTENDING ADDENDUM: 


The patient was seen and personally examined.


Agree with Mark Lombardo, PA-C's findings and plans as documented above.





Laboratory Results





Last 24 Hours








Test


  10/16/17


11:48 10/16/17


16:20 10/16/17


20:25 10/17/17


05:27


 


Bedside Glucose 119 mg/dl  119 mg/dl  129 mg/dl  


 


White Blood Count    3.92 K/uL 


 


Red Blood Count    4.28 M/uL 


 


Hemoglobin    12.2 g/dL 


 


Hematocrit    37.0 % 


 


Mean Corpuscular Volume    86.4 fL 


 


Mean Corpuscular Hemoglobin    28.5 pg 


 


Mean Corpuscular Hemoglobin


Concent 


  


  


  33.0 g/dl 


 


 


RDW Standard Deviation    44.6 fL 


 


RDW Coefficient of Variation    14.2 % 


 


Platelet Count    168 K/uL 


 


Mean Platelet Volume    10.3 fL 


 


Sodium Level    143 mmol/L 


 


Potassium Level    3.4 mmol/L 


 


Chloride Level    108 mmol/L 


 


Carbon Dioxide Level    29 mmol/L 


 


Anion Gap    6.0 mmol/L 


 


Blood Urea Nitrogen    30 mg/dl 


 


Creatinine    2.56 mg/dl 


 


Est Creatinine Clear Calc


Drug Dose 


  


  


  33.6 ml/min 


 


 


Estimated GFR (


American) 


  


  


  27.8 


 


 


Estimated GFR (Non-


American 


  


  


  24.0 


 


 


BUN/Creatinine Ratio    11.8 


 


Random Glucose    72 mg/dl 


 


Calcium Level    8.0 mg/dl 


 


Test


  10/17/17


07:19 10/17/17


07:44 


  


 


 


Bedside Glucose 64 mg/dl  88 mg/dl

## 2017-10-17 NOTE — PROGRESS NOTE
Medicine Progress Note


Date & Time of Visit:


Oct 17, 2017 at 13:20.


Subjective


patient seen sleeping but easily rousable


states he feels much better


breathing has improved significantly


has occasional dry cough


denies chest pain, palpitations, dizziness


no other symptoms





Objective





Last 8 Hrs








  Date Time  Temp Pulse Resp B/P (MAP) Pulse Ox O2 Delivery O2 Flow Rate FiO2


 


10/17/17 12:00     95 Nasal Cannula 2.0 


 


10/17/17 11:15 36.6 70 16 150/66 (94) 95 Nasal Cannula 2.0 


 


10/17/17 09:30 36.6 62 16 146/73 (97) 93 Nasal Cannula 2.0 


 


10/17/17 07:30     94 Nasal Cannula 2.0 


 


10/17/17 07:28 36.7 65 20 172/78 (109) 95 Oxymask  


 


10/17/17 06:54  65 16  94 Nasal Cannula 2.0 








Physical Exam:


General- oriented x 3, not in distress, speaks in sentences with no effort





Head-  atraumatic





Eyes- EOMI, anicteric





ENT- oropharynx clear





Neck- supple, no JVD





Lungs- clear to auscultation bilaterally





Heart- regular rhythm; no murmur, normal rate





Abdomen- normal bowel sounds, soft, nontender





Extremities- no pretibial edema, no calf tenderness; peripheral pulses intact





Neuro- alert, oriented x 3;no gross focal deficits





Skin- warm & dry


Laboratory Results:





Last 24 Hours








Test


  10/16/17


16:20 10/16/17


20:25 10/17/17


05:27 10/17/17


07:19


 


Bedside Glucose 119 mg/dl  129 mg/dl   64 mg/dl 


 


White Blood Count   3.92 K/uL  


 


Red Blood Count   4.28 M/uL  


 


Hemoglobin   12.2 g/dL  


 


Hematocrit   37.0 %  


 


Mean Corpuscular Volume   86.4 fL  


 


Mean Corpuscular Hemoglobin   28.5 pg  


 


Mean Corpuscular Hemoglobin


Concent 


  


  33.0 g/dl 


  


 


 


RDW Standard Deviation   44.6 fL  


 


RDW Coefficient of Variation   14.2 %  


 


Platelet Count   168 K/uL  


 


Mean Platelet Volume   10.3 fL  


 


Sodium Level   143 mmol/L  


 


Potassium Level   3.4 mmol/L  


 


Chloride Level   108 mmol/L  


 


Carbon Dioxide Level   29 mmol/L  


 


Anion Gap   6.0 mmol/L  


 


Blood Urea Nitrogen   30 mg/dl  


 


Creatinine   2.56 mg/dl  


 


Est Creatinine Clear Calc


Drug Dose 


  


  33.6 ml/min 


  


 


 


Estimated GFR (


American) 


  


  27.8 


  


 


 


Estimated GFR (Non-


American 


  


  24.0 


  


 


 


BUN/Creatinine Ratio   11.8  


 


Random Glucose   72 mg/dl  


 


Calcium Level   8.0 mg/dl  


 


Test


  10/17/17


07:44 10/17/17


10:54 


  


 


 


Bedside Glucose 88 mg/dl  269 mg/dl   











Assessment & Plan


This is a 72 year old male with a PMH of CAD s/p CABGx2, Hx. of Cardiac Arrest 

and V-Fib s/p AICD placement, insulin dependent DM2 with complications 

including peripheral vascular disease s/p R foot transmetatarsal amputation, 

CKD stage IV, SABRA on nocturnal O2, Hypothyroidism, Mood Disorder with 

depression - presents with shortness of breath





ACUTE ON LIKELY CHRONIC DIASTOLIC CHF


- echo noted


 clinically improved with Lasix


  Isordil added


  will need daily Lasix and fluid restriction


- possible component of Acute Bronchitis?


 on Doxycycline


- appreciate Cardio SVC input





DM 2


patient is on a high dose of insulin, takes 52 units twice daily of Lantus as 

well as a sliding scale of Novolog


a1c 7.0


- (+) hypoglycemia


- Lantus decreased


 appreciate Pharm SVC recommendations





HTN


improving


Isordil added





CKD stage IV


patient states that he was to have further kidney w/up


- crea trending up, baseline 2.4


  GFR high 20s ,stable


- will need to monitor crea while on Lasix


- Nephrology consulted





Hx. of CAD s/p CABGx2


Hx. of cardiac arrest and V-Fib s/p AICD


cardiac markers negative


echo noted


on Plavix, Aspirin





SABRA


did not tolerate CPAP


uses 2L O2 nocturnally


check nocturnal pulse ox as SOB is worse at night


-- needs O2 supplement at HS





Hypothyroidism


continue current dose of Synthroid





Mood Disorder and Depression


continue current medications





DVT ppx


subq heparin





FULL CODE





Disposition


pending


anticipate return home when medically stable and cleared by Cardiology


Current Inpatient Medications:





Current Inpatient Medications








 Medications


  (Trade)  Dose


 Ordered  Sig/Richard


 Route  Start Time


 Stop Time Status Last Admin


Dose Admin


 


 Heparin Sodium


  (Porcine)


  (Heparin Sq 5000


 Unit/0.5ml)  5,000 unit  Q8


 SQ  10/15/17 14:00


 11/14/17 13:59  10/17/17 05:50


5,000 UNIT


 


 Insulin Aspart


  (novoLOG ASPART)  **SLIDING


 SCALE**


 **If C...  ACHS


 SC  10/15/17 11:00


 11/14/17 10:59  10/17/17 11:57


14 UNITS


 


 Glucose


  (Glucose 40% Gel)  15-30


 GRAMS 15


 GRAMS...  UD  PRN


 PO  10/15/17 10:45


 11/14/17 10:44   


 


 


 Glucose


  (Glucose Chew


 Tab)  4-8


 Tablets 4


 Tabl...  UD  PRN


 PO  10/15/17 10:45


 11/14/17 10:44   


 


 


 Dextrose


  (Dextrose 50%


 50ML Syringe)  25-50ML OF


 50% DW IV


 FOR...  UD  PRN


 IV  10/15/17 10:45


 11/14/17 10:44   


 


 


 Glucagon


  (Glucagon Inj)  1 mg  UD  PRN


 SQ  10/15/17 10:45


 11/14/17 10:44   


 


 


 Amlodipine


 Besylate


  (Norvasc Tab)  10 mg  DAILY


 PO  10/16/17 09:00


 11/15/17 08:59  10/17/17 08:31


10 MG


 


 Clopidogrel


 Bisulfate


  (plAVix TAB)  75 mg  DAILY


 PO  10/16/17 09:00


 11/15/17 08:59  10/17/17 08:31


75 MG


 


 Duloxetine HCl


  (Cymbalta Cap)  30 mg  HS


 PO  10/15/17 21:00


 11/14/17 20:59  10/16/17 21:01


30 MG


 


 Duloxetine HCl


  (Cymbalta Cap)  60 mg  DAILY


 PO  10/16/17 09:00


 11/15/17 08:59  10/17/17 08:30


60 MG


 


 Imipramine HCl


  (Tofranil Tab)  50 mg  HS


 PO  10/15/17 21:00


 11/14/17 20:59  10/16/17 21:03


50 MG


 


 Imipramine HCl


  (Tofranil Tab)  25 mg  QAM


 PO  10/16/17 09:00


 11/15/17 08:59  10/17/17 08:31


25 MG


 


 Levothyroxine


 Sodium


  (Synthroid Tab)  50 mcg  DAILYBB


 PO  10/16/17 06:30


 11/15/17 06:59  10/17/17 05:52


50 MCG


 


 Magnesium Oxide


  (Mag-Ox Tab)  400 mg  BID


 PO  10/15/17 21:00


 11/14/17 20:59  10/17/17 08:30


400 MG


 


 Metoprolol


 Succinate


  (Toprol Xl Tab)  37.5 mg  HS


 PO  10/15/17 21:00


 11/14/17 20:59  10/16/17 21:04


37.5 MG


 


 Multivitamins


  (Multivitamin


 Tab)  1 tab  DAILY


 PO  10/16/17 09:00


 11/15/17 08:59  10/17/17 08:30


1 TAB


 


 Pantoprazole


 Sodium


  (Protonix Tab)  40 mg  DAILY


 PO  10/16/17 09:00


 11/15/17 08:59  10/17/17 08:31


40 MG


 


 Pregabalin


  (Lyrica Cap)  150 mg  BID


 PO  10/15/17 21:00


 11/14/17 20:59  10/17/17 08:38


150 MG


 


 Rosuvastatin


 Calcium


  (Crestor Tab)  40 mg  DAILY


 PO  10/16/17 09:00


 11/15/17 08:59  10/17/17 08:30


40 MG


 


 Bupropion HCl


  (Wellbutrin-Sr


 Tab)  150 mg  BID


 PO  10/15/17 21:00


 11/14/17 20:59  10/17/17 08:31


150 MG


 


 Miscellaneous


 Information


  (Order Awaiting


 Action)  1 ea  QS


 N/A  10/15/17 16:00


 11/14/17 15:59   


 


 


 Sodium Chloride


  (Ocean Nasal


 Spray)  1 sprays  PRN  PRN


 NA  10/15/17 16:45


 11/14/17 16:44   


 


 


 Promethazine HCl


 12.5 mg/Sodium


 Chloride  50.5 ml @ 


 204 mls/hr  Q6H  PRN


 IV  10/15/17 22:15


 11/14/17 22:14  10/15/17 22:29


204 MLS/HR


 


 Albuterol/


 Ipratropium


  (Combivent


 Respimat Inh)  1 puffs  QID


 INH  10/17/17 13:00


 11/16/17 12:59   


 


 


 Miscellaneous


 Information


  (Consult


 Glycemic


 Management


 Pharmacy)  1 ea  UD  PRN


 N/A  10/17/17 09:45


 11/16/17 09:44   


 


 


 Isosorbide


 Dinitrate


  (Isordil Tab)  5 mg  TID@0700,1200,1700


 PO  10/17/17 12:00


 11/16/17 11:59  10/17/17 11:55


5 MG


 


 Insulin Glargine


  (Lantus Solostar


 Pen)  20 units  BID


 SC  10/17/17 11:30


 11/16/17 11:29  10/17/17 11:58


20 UNITS


 


 Insulin Aspart


  (novoLOG ASPART)  **SLIDING


 SCALE**


 **If C...  TODAY@0200


 SC  10/18/17 02:00


 10/18/17 02:01

## 2017-10-17 NOTE — PHARMACY PROGRESS NOTE
Glycemic Control Intl Consult


Date of Service


Oct 17, 2017.





Scope


Glycemic Pharmacist consulted by Dr Sky on 10/17/17 for glycemic control and 

to write orders per [facility] inpatient glycemic control protocol





Objective


Weight (Kilograms):  114.600


Accuchecks BSG (last 24hrs):











Test


  10/16/17


11:48 10/16/17


16:20 10/16/17


20:25 10/17/17


05:27


 


Bedside Glucose


  119 mg/dl


(70-99) 119 mg/dl


(70-99) 129 mg/dl


(70-99) 


 


 


Random Glucose


  


  


  


  72 mg/dl


(70-99)


 


Test


  10/17/17


07:19 10/17/17


07:44 


  


 


 


Bedside Glucose


  64 mg/dl


(70-99) 88 mg/dl


(70-99) 


  


 








Laboratory Data (last 24hrs)











Test


  10/17/17


05:27


 


Anion Gap 6.0 mmol/L 


 


BUN/Creatinine Ratio 11.8 


 


Blood Urea Nitrogen 30 mg/dl 


 


Creatinine 2.56 mg/dl 


 


Potassium Level 3.4 mmol/L 


 


Sodium Level 143 mmol/L 


 


White Blood Count 3.92 K/uL 








HbA1c











Test


  10/16/17


06:08


 


Hemoglobin A1c


  7.0 %


(4.5-5.6)  H











Recent Pertinent Medications


Outpatient Anti-diabetic Regimen: 


* Lantus 52 units in the morning and evening plus Novolog 29 units AC and 

correction factor of 1 unit for every 50 mg/dL over 150 mg/dL








The patient is currently receiving:


* Basal insulin:              Lantus 45 units every 12 hours





* Correctional Insulin:     Novolog Correction per scale ACHS


                            Goal Range: Low 120 mg/dL - High 160 mg/dL


                            Correction Factor: 15 mg/dL/unit





* Prandial insulin:           Per carb ratio of 1 unit per 10 grams CHO consumed








Risk Factors for Insulin Resistance:


* Infection: possible pulmonary infection on doxycycline 


* Diet: type 2 diabetic diet





Assessment & Plan


ASSESSMENT:


* ADA & AACE recommend a goal blood sugar range 140-180 mg/dl for the majority 

of critically ill & non-critically ill patients.  However, more stringent 

targets may be selected in individual cases.  Will utilize more stringent goal 

of 110-140 mg/dl based on patient age & comorbidities. Additionally, tighter 

glycemic control is warranted to facilitate infection healing.


* Mr Valdes is a 71 y/o M with a complex cardiac PMH history including CABG x 2 

and cardiac arrest with VFib and subsequent ACID placement who was admitted 

with SBO. He was initially placed on a reduced Lantus dose of 45 units twice 

daily (takes 52 units twice daily at home) - he received 3 doses. His blood 

sugars yesterday ranged from 118-129 mg/dL; however, his fasting blood sugar 

was 64 mg/dL this morning. 


* At home, the patient receives around ~191 units of insulin/day; however, 

yesterday he received around 97 units (~50% of home dose). This appears 

appropriate especially when patients are admitted on larger doses of insulin. 

Patient's renal function also continues to worsen with diuresing. 


* In order to create a regimen today, the total daily dose yesterday will be 

utilized and reduced by ~20% to 80 units per day. 50% will be taken to make 

Lantus 20 units twice daily and then the appropriate parameters for 

correctional insulin will be utilized for estimated total daily dose of 80 units

/day. Will start Lantus with lunch to give patient's blood sugar time to 

recover.





PLAN FOR INPATIENT GLYCEMIC CONTROL:





* Basal insulin with LANTUS 20 units SQ BID





* Correctional Insulin with NOVOLOG per scale ACHS or Q6hrs while NPO


 * Goal Range:  Low 110 mg/dL - High 140 mg/dL


 * Correction Factor: 20 mg/dL/unit


 * Nutritional / Prandial insulin per carb ratio of 1 unit per 7 grams CHO 

consumed





* Please note that the plan above was derived based on current level of insulin 

resistance and hospital stress. These recommendations are appropriate for 

inpatient admission only. Plan of care upon discharge will need to be 

reassessed to avoid potential outpatient hypo/hyperglycemia. 





Thank you.

## 2017-10-18 VITALS
DIASTOLIC BLOOD PRESSURE: 75 MMHG | HEART RATE: 68 BPM | OXYGEN SATURATION: 96 % | TEMPERATURE: 98.06 F | SYSTOLIC BLOOD PRESSURE: 156 MMHG

## 2017-10-18 VITALS — OXYGEN SATURATION: 95 %

## 2017-10-18 VITALS
SYSTOLIC BLOOD PRESSURE: 147 MMHG | OXYGEN SATURATION: 95 % | DIASTOLIC BLOOD PRESSURE: 63 MMHG | TEMPERATURE: 97.88 F | HEART RATE: 74 BPM

## 2017-10-18 VITALS
DIASTOLIC BLOOD PRESSURE: 58 MMHG | TEMPERATURE: 98.24 F | SYSTOLIC BLOOD PRESSURE: 110 MMHG | HEART RATE: 72 BPM | OXYGEN SATURATION: 94 %

## 2017-10-18 VITALS
TEMPERATURE: 98.06 F | DIASTOLIC BLOOD PRESSURE: 70 MMHG | SYSTOLIC BLOOD PRESSURE: 142 MMHG | HEART RATE: 62 BPM | OXYGEN SATURATION: 94 %

## 2017-10-18 VITALS
HEART RATE: 66 BPM | OXYGEN SATURATION: 93 % | DIASTOLIC BLOOD PRESSURE: 88 MMHG | SYSTOLIC BLOOD PRESSURE: 164 MMHG | TEMPERATURE: 98.6 F

## 2017-10-18 VITALS
TEMPERATURE: 98.24 F | HEART RATE: 61 BPM | DIASTOLIC BLOOD PRESSURE: 76 MMHG | OXYGEN SATURATION: 96 % | SYSTOLIC BLOOD PRESSURE: 138 MMHG

## 2017-10-18 VITALS — OXYGEN SATURATION: 90 %

## 2017-10-18 VITALS — OXYGEN SATURATION: 96 %

## 2017-10-18 LAB
ANION GAP SERPL CALC-SCNC: 5 MMOL/L (ref 3–11)
BUN SERPL-MCNC: 32 MG/DL (ref 7–18)
BUN/CREAT SERPL: 12 (ref 10–20)
CALCIUM SERPL-MCNC: 8.4 MG/DL (ref 8.5–10.1)
CHLORIDE SERPL-SCNC: 106 MMOL/L (ref 98–107)
CO2 SERPL-SCNC: 30 MMOL/L (ref 21–32)
CREAT CL PREDICTED SERPL C-G-VRATE: 32.1 ML/MIN
CREAT SERPL-MCNC: 2.68 MG/DL (ref 0.6–1.4)
GLUCOSE SERPL-MCNC: 144 MG/DL (ref 70–99)
POTASSIUM SERPL-SCNC: 3.9 MMOL/L (ref 3.5–5.1)
SODIUM SERPL-SCNC: 141 MMOL/L (ref 136–145)

## 2017-10-18 RX ADMIN — CLOPIDOGREL BISULFATE SCH MG: 75 TABLET, FILM COATED ORAL at 08:07

## 2017-10-18 RX ADMIN — BUPROPION HYDROCHLORIDE SCH MG: 150 TABLET, EXTENDED RELEASE ORAL at 08:07

## 2017-10-18 RX ADMIN — INSULIN GLARGINE SCH UNITS: 100 INJECTION, SOLUTION SUBCUTANEOUS at 21:47

## 2017-10-18 RX ADMIN — METOPROLOL SUCCINATE SCH MG: 25 TABLET, EXTENDED RELEASE ORAL at 21:56

## 2017-10-18 RX ADMIN — INSULIN ASPART SCH UNITS: 100 INJECTION, SOLUTION INTRAVENOUS; SUBCUTANEOUS at 08:12

## 2017-10-18 RX ADMIN — Medication SCH MG: at 08:06

## 2017-10-18 RX ADMIN — IPRATROPIUM BROMIDE AND ALBUTEROL SCH PUFFS: 20; 100 SPRAY, METERED RESPIRATORY (INHALATION) at 08:07

## 2017-10-18 RX ADMIN — INSULIN ASPART SCH UNITS: 100 INJECTION, SOLUTION INTRAVENOUS; SUBCUTANEOUS at 21:00

## 2017-10-18 RX ADMIN — INSULIN ASPART SCH UNITS: 100 INJECTION, SOLUTION INTRAVENOUS; SUBCUTANEOUS at 12:49

## 2017-10-18 RX ADMIN — INSULIN ASPART SCH UNITS: 100 INJECTION, SOLUTION INTRAVENOUS; SUBCUTANEOUS at 17:54

## 2017-10-18 RX ADMIN — Medication SCH TAB: at 08:07

## 2017-10-18 RX ADMIN — ISOSORBIDE DINITRATE SCH MG: 5 TABLET ORAL at 08:07

## 2017-10-18 RX ADMIN — Medication SCH MG: at 21:57

## 2017-10-18 RX ADMIN — LEVOTHYROXINE SODIUM SCH MCG: 50 TABLET ORAL at 05:18

## 2017-10-18 RX ADMIN — PREGABALIN SCH MG: 150 CAPSULE ORAL at 08:17

## 2017-10-18 RX ADMIN — ISOSORBIDE DINITRATE SCH MG: 5 TABLET ORAL at 05:19

## 2017-10-18 RX ADMIN — HEPARIN SODIUM SCH UNIT: 10000 INJECTION, SOLUTION INTRAVENOUS; SUBCUTANEOUS at 14:10

## 2017-10-18 RX ADMIN — IPRATROPIUM BROMIDE AND ALBUTEROL SCH PUFFS: 20; 100 SPRAY, METERED RESPIRATORY (INHALATION) at 17:50

## 2017-10-18 RX ADMIN — IPRATROPIUM BROMIDE AND ALBUTEROL SCH PUFFS: 20; 100 SPRAY, METERED RESPIRATORY (INHALATION) at 21:53

## 2017-10-18 RX ADMIN — HEPARIN SODIUM SCH UNIT: 10000 INJECTION, SOLUTION INTRAVENOUS; SUBCUTANEOUS at 21:48

## 2017-10-18 RX ADMIN — DULOXETINE SCH MG: 60 CAPSULE, DELAYED RELEASE PELLETS ORAL at 08:07

## 2017-10-18 RX ADMIN — ALUMINUM ZIRCONIUM TRICHLOROHYDREX GLY SCH EA: 0.2 STICK TOPICAL at 08:00

## 2017-10-18 RX ADMIN — INSULIN GLARGINE SCH UNITS: 100 INJECTION, SOLUTION SUBCUTANEOUS at 08:12

## 2017-10-18 RX ADMIN — DULOXETINE SCH MG: 30 CAPSULE, DELAYED RELEASE PELLETS ORAL at 21:55

## 2017-10-18 RX ADMIN — PREGABALIN SCH MG: 150 CAPSULE ORAL at 21:53

## 2017-10-18 RX ADMIN — HEPARIN SODIUM SCH UNIT: 10000 INJECTION, SOLUTION INTRAVENOUS; SUBCUTANEOUS at 05:15

## 2017-10-18 RX ADMIN — ISOSORBIDE DINITRATE SCH MG: 5 TABLET ORAL at 17:49

## 2017-10-18 RX ADMIN — Medication SCH MG: at 21:56

## 2017-10-18 RX ADMIN — AMLODIPINE BESYLATE SCH MG: 5 TABLET ORAL at 08:06

## 2017-10-18 RX ADMIN — ROSUVASTATIN CALCIUM SCH MG: 20 TABLET, FILM COATED ORAL at 08:07

## 2017-10-18 RX ADMIN — PANTOPRAZOLE SCH MG: 40 TABLET, DELAYED RELEASE ORAL at 08:07

## 2017-10-18 RX ADMIN — BUPROPION HYDROCHLORIDE SCH MG: 150 TABLET, EXTENDED RELEASE ORAL at 21:54

## 2017-10-18 RX ADMIN — ALUMINUM ZIRCONIUM TRICHLOROHYDREX GLY SCH EA: 0.2 STICK TOPICAL at 15:20

## 2017-10-18 RX ADMIN — IMIPRAMINE HYDROCHLORIDE SCH MG: 25 TABLET, FILM COATED ORAL at 08:06

## 2017-10-18 RX ADMIN — IPRATROPIUM BROMIDE AND ALBUTEROL SCH PUFFS: 20; 100 SPRAY, METERED RESPIRATORY (INHALATION) at 12:49

## 2017-10-18 NOTE — PROGRESS NOTE
Medicine Progress Note


Date & Time of Visit:


Oct 18, 2017 at 13:40.


Subjective


patient was seen being assisted by the respiratory therapist and CNA, he was 

upright but became very shaky on his legs, had to be assisted by 2 other RNs


he sat on the wheelchair and had a difficult trying to stand up again, noted to 

be very shaky on his legs


PT notes today reveal that patient was very tremulous during PT eval today


yesterday, patient reported that he has episodes of resting tremors/shaking of 

his legs and arms





otherwise, patient states he feels fine overall


denies dyspnea, chest pain, dizziness


no other symptoms





Objective





Last 8 Hrs








  Date Time  Temp Pulse Resp B/P (MAP) Pulse Ox O2 Delivery O2 Flow Rate FiO2


 


10/18/17 11:20 36.7 62 18 142/70 (94) 94   


 


10/18/17 07:34 36.8 61 20 138/76 (96) 96   


 


10/18/17 07:30     96 Nasal Cannula 2.0 








Physical Exam:


General- oriented x 3, not in distress, speaks in sentences with no effort





Eyes- anicteric





ENT- oropharynx clear





Neck- no JVD





Lungs- clear to breath sounds bilaterally





Heart- regular rhythm; no murmur, normal rate





Abdomen- normal bowel sounds, soft, nontender





Extremities- no pretibial edema, no calf tenderness; peripheral pulses intact





Neuro- alert, oriented x 3;no gross focal deficits





Skin- warm & dry


Laboratory Results:





Last 24 Hours








Test


  10/17/17


16:04 10/17/17


20:15 10/18/17


02:16 10/18/17


07:20


 


Bedside Glucose 75 mg/dl  122 mg/dl  107 mg/dl  113 mg/dl 


 


Test


  10/18/17


09:45 10/18/17


11:26 


  


 


 


Sodium Level 141 mmol/L    


 


Potassium Level 3.9 mmol/L    


 


Chloride Level 106 mmol/L    


 


Carbon Dioxide Level 30 mmol/L    


 


Anion Gap 5.0 mmol/L    


 


Blood Urea Nitrogen 32 mg/dl    


 


Creatinine 2.68 mg/dl    


 


Est Creatinine Clear Calc


Drug Dose 32.1 ml/min 


  


  


  


 


 


Estimated GFR (


American) 26.3 


  


  


  


 


 


Estimated GFR (Non-


American 22.7 


  


  


  


 


 


BUN/Creatinine Ratio 12.0    


 


Random Glucose 144 mg/dl    


 


Calcium Level 8.4 mg/dl    


 


Bedside Glucose  144 mg/dl   











Assessment & Plan


This is a 72 year old male with a PMH of CAD s/p CABGx2, Hx. of Cardiac Arrest 

and V-Fib s/p AICD placement, insulin dependent DM2 with complications 

including peripheral vascular disease s/p R foot transmetatarsal amputation, 

CKD stage IV, SABRA on nocturnal O2, Hypothyroidism, Mood Disorder with 

depression - presents with shortness of breath





ACUTE ON LIKELY CHRONIC DIASTOLIC CHF, Resolved


- echo noted


 clinically improved with Lasix one dose


  Isordil TID added


  will need Lasix PRN 2-3x a week, PRN for CHF symptoms, but need to closely 

watch crea


- possible component of Acute Bronchitis?


 on Doxycycline


- appreciate Cardio SVC input





TREMORS/SHAKES


- unclear etiology


- will consult Neurology


  PT recommends Rehab 


  will consult Case Management





DM 2


patient is on a high dose of insulin, takes 52 units twice daily of Lantus as 

well as a sliding scale of Novolog


a1c 7.0


- (+) hypoglycemia the other day


- Lantus decreased


 appreciate Pharm SVC recommendations





HTN


improving


Isordil added





CKD stage IV


patient states that he was to have further kidney w/up


- crea trending up, baseline 2.4


 today 2.6


- will need to monitor crea


- Nephrology consulted





Hx. of CAD s/p CABGx2


Hx. of cardiac arrest and V-Fib s/p AICD


cardiac markers negative


echo noted


on Plavix, Aspirin





SABRA


did not tolerate CPAP


uses 2L O2 nocturnally


check nocturnal pulse ox as SOB is worse at night


-- needs O2 supplement at HS





Hypothyroidism


continue current dose of Synthroid





Mood Disorder and Depression


continue current medications





DVT ppx


subq heparin





FULL CODE





Disposition


PT recommending Rehab


Current Inpatient Medications:





Current Inpatient Medications








 Medications


  (Trade)  Dose


 Ordered  Sig/Richard


 Route  Start Time


 Stop Time Status Last Admin


Dose Admin


 


 Heparin Sodium


  (Porcine)


  (Heparin Sq 5000


 Unit/0.5ml)  5,000 unit  Q8


 SQ  10/15/17 14:00


 11/14/17 13:59  10/18/17 05:15


5,000 UNIT


 


 Insulin Aspart


  (novoLOG ASPART)  **SLIDING


 SCALE**


 **If C...  ACHS


 SC  10/15/17 11:00


 11/14/17 10:59  10/18/17 12:49


7 UNITS


 


 Glucose


  (Glucose 40% Gel)  15-30


 GRAMS 15


 GRAMS...  UD  PRN


 PO  10/15/17 10:45


 11/14/17 10:44   


 


 


 Glucose


  (Glucose Chew


 Tab)  4-8


 Tablets 4


 Tabl...  UD  PRN


 PO  10/15/17 10:45


 11/14/17 10:44   


 


 


 Dextrose


  (Dextrose 50%


 50ML Syringe)  25-50ML OF


 50% DW IV


 FOR...  UD  PRN


 IV  10/15/17 10:45


 11/14/17 10:44   


 


 


 Glucagon


  (Glucagon Inj)  1 mg  UD  PRN


 SQ  10/15/17 10:45


 11/14/17 10:44   


 


 


 Amlodipine


 Besylate


  (Norvasc Tab)  10 mg  DAILY


 PO  10/16/17 09:00


 11/15/17 08:59  10/18/17 08:06


10 MG


 


 Clopidogrel


 Bisulfate


  (plAVix TAB)  75 mg  DAILY


 PO  10/16/17 09:00


 11/15/17 08:59  10/18/17 08:07


75 MG


 


 Duloxetine HCl


  (Cymbalta Cap)  30 mg  HS


 PO  10/15/17 21:00


 11/14/17 20:59  10/17/17 20:31


30 MG


 


 Duloxetine HCl


  (Cymbalta Cap)  60 mg  DAILY


 PO  10/16/17 09:00


 11/15/17 08:59  10/18/17 08:07


60 MG


 


 Imipramine HCl


  (Tofranil Tab)  50 mg  HS


 PO  10/15/17 21:00


 11/14/17 20:59  10/17/17 20:34


50 MG


 


 Imipramine HCl


  (Tofranil Tab)  25 mg  QAM


 PO  10/16/17 09:00


 11/15/17 08:59  10/18/17 08:06


25 MG


 


 Levothyroxine


 Sodium


  (Synthroid Tab)  50 mcg  DAILYBB


 PO  10/16/17 06:30


 11/15/17 06:59  10/18/17 05:18


50 MCG


 


 Magnesium Oxide


  (Mag-Ox Tab)  400 mg  BID


 PO  10/15/17 21:00


 11/14/17 20:59  10/18/17 08:06


400 MG


 


 Metoprolol


 Succinate


  (Toprol Xl Tab)  37.5 mg  HS


 PO  10/15/17 21:00


 11/14/17 20:59  10/17/17 20:34


37.5 MG


 


 Multivitamins


  (Multivitamin


 Tab)  1 tab  DAILY


 PO  10/16/17 09:00


 11/15/17 08:59  10/18/17 08:07


1 TAB


 


 Pantoprazole


 Sodium


  (Protonix Tab)  40 mg  DAILY


 PO  10/16/17 09:00


 11/15/17 08:59  10/18/17 08:07


40 MG


 


 Pregabalin


  (Lyrica Cap)  150 mg  BID


 PO  10/15/17 21:00


 11/14/17 20:59  10/18/17 08:17


150 MG


 


 Rosuvastatin


 Calcium


  (Crestor Tab)  40 mg  DAILY


 PO  10/16/17 09:00


 11/15/17 08:59  10/18/17 08:07


40 MG


 


 Bupropion HCl


  (Wellbutrin-Sr


 Tab)  150 mg  BID


 PO  10/15/17 21:00


 11/14/17 20:59  10/18/17 08:07


150 MG


 


 Miscellaneous


 Information


  (Order Awaiting


 Action)  1 ea  QS


 N/A  10/15/17 16:00


 11/14/17 15:59   


 


 


 Sodium Chloride


  (Ocean Nasal


 Spray)  1 sprays  PRN  PRN


 NA  10/15/17 16:45


 11/14/17 16:44   


 


 


 Promethazine HCl


 12.5 mg/Sodium


 Chloride  50.5 ml @ 


 204 mls/hr  Q6H  PRN


 IV  10/15/17 22:15


 11/14/17 22:14  10/15/17 22:29


204 MLS/HR


 


 Albuterol/


 Ipratropium


  (Combivent


 Respimat Inh)  1 puffs  QID


 INH  10/17/17 13:00


 11/16/17 12:59  10/18/17 12:49


1 PUFFS


 


 Miscellaneous


 Information


  (Consult


 Glycemic


 Management


 Pharmacy)  1 ea  UD  PRN


 N/A  10/17/17 09:45


 11/16/17 09:44   


 


 


 Isosorbide


 Dinitrate


  (Isordil Tab)  5 mg  TID@0700,1200,1700


 PO  10/17/17 12:00


 11/16/17 11:59  10/18/17 08:07


5 MG


 


 Insulin Glargine


  (Lantus Solostar


 Pen)  20 units  BID


 SC  10/17/17 11:30


 11/16/17 11:29  10/18/17 08:12


20 UNITS


 


 Aspirin


  (Ecotrin Tab)  81 mg  DAILY


 PO  10/18/17 09:00


 11/17/17 08:59  10/18/17 08:06


81 MG

## 2017-10-18 NOTE — CARDIOLOGY FOLLOW-UP
Subjective


General


Date of Service:


Oct 18, 2017.


Chief Complaint:  SOB





History of Present Illness


Patient seen and examined. 


No complaints. Feels better overall. 


No chest pain. No palpitations. 





Telemetry: Sinus in the 60's with a first degree AV block, PAC's, rare PVC in 

singles, and rare rare ventricular pacing.





Allergies


Coded Allergies:  


     Moxifloxacin (Unverified  Adverse Reaction, Severe, "DEPLETED POTASSIUM 

AND MAGNESIUM. CARDIAC ARREST" PT WIFE, 10/15/17)





Social History


Hx Tobacco Use In Past Year?:  No


Hx Alcohol Use - Type And Amou:  No


Hx Substance Use - Type And Am:  No





Problem List


Medical Problems:


(1) Hypertension


Status: Acute  





(2) Hypoxia


Status: Acute  





(3) SOB (shortness of breath)


Status: Acute  











Physical Exam


Vital Signs





Last Vital Signs Documentation








  Date Time  Temp Pulse Resp B/P (MAP) Pulse Ox O2 Delivery O2 Flow Rate FiO2


 


10/18/17 07:34 36.8 61 20 138/76 (96) 96   


 


10/18/17 07:30      Nasal Cannula 2.0 











Physical Exam


Constitutional:  


   Level of Distress:  NAD


Psychiatric:  


   Mental Status:  active & alert


   Orientation:  to time, to place, to person


   Memory:  recent memory normal, remote memory normal


Head:  normocephalic, atraumatic


Neck:  pertinent finding (Normal JVP)


Lungs:  


   Respiratory effort:  no dyspnea


   Auscultation:  no wheezing, no rales/crackles, no rhonchi


Cardiovascular:  


   Heart Auscultation:  RRR, no murmurs, no rubs


Peripheral Pulses:  


   Dorsalis Pedis Pulse:  absent on the left, absent on the right


Extremities:  no cyanosis, no clubbing, edema (Mild edema)


Neurologic:  


   Cranial Nerves:  grossly intact





Assessment and Plan


Assessment and Plan


Complex 72 year old male presenting with acute on chronic dyspnea, evidence of 

acute decompensated diastolic congestive heart failure - resolved. Blood 

pressures have improved with the addition of Isordil, without adverse issues at 

this point. No overt atrial fibrillation observed. 





RECOMMENDATIONS/PLAN:


As ordered/prescribed. 


Increase activity as tolerated. 


Outpatient Cardiology follow-up with Dr. Caldwell. 


Please call if any questions or concerns. 


CARDIOLOGY ATTENDING ADDENDUM: 


The patient was seen and personally examined.


Agree with Mark Lombardo, PA-C's findings and plans as documented above.





Laboratory Results





Last 24 Hours








Test


  10/17/17


10:54 10/17/17


16:04 10/17/17


20:15 10/18/17


02:16


 


Bedside Glucose 269 mg/dl  75 mg/dl  122 mg/dl  107 mg/dl 


 


Test


  10/18/17


07:20 


  


  


 


 


Bedside Glucose 113 mg/dl

## 2017-10-18 NOTE — NEUROLOGY CONSULTATION
Neurology Consultation


Date of Consultation:


Oct 18, 2017.


Attending Physician:


Jj Padilla MD


Primary Care Physician:


Javed Mccormick M.D.


Reason for Consultation:


tremors,shaking


History of Present Illness


Source:  patient


Christiano is a 72 year old male with a PMH of CAD s/p CABGx2, Hx. of Cardiac 

Arrest and V-Fib s/p AICD placement, insulin dependent DM2 with complications 

including peripheral vascular disease s/p R foot transmetatarsal amputation, 

CKD stage IV, SABRA on nocturnal O2, Hypothyroidism, Mood Disorder with 

depression. He presented to the hospital admission date 10/15/17  with 

shortness of breath. He was having shortness of breath and "throat pain" for 

the past three weeks. He has seen his primary care physician; and was given 

antibiotics at that time. He said he never fully recovered.  He uses O2 

nocturnally due to sleep apnea (did not tolerate CPAP mask); and he turned it 

up to 3L of O2 with no help. He also developed some chest/throat pain. He 

states he started having shaking episodes about 1 year ago and it is when he is 

trying to get up from lying or from sitting to standing. At one point he was on 

gabapentin but it was briefly and he doesn't really remember taking it or why 

it was stopped. denies swallowing difficulty, CP, SOB, abdominal pain, one 

sided weakness, numbness tingling, N, V, wet pillows in am, bowel or bladder 

issues





Past Medical/Surgical History


Medical Problems:


(1) Hypertension


Status: Acute  





(2) Hypoxia


Status: Acute  





(3) SOB (shortness of breath)


Status: Acute  











Social History


Smoking Status:  Never smoker


Alcohol Use:  socially


Drug Use:  none


Marital Status:  


Housing Status:  lives with significant other





Allergies


Coded Allergies:  


     Moxifloxacin (Unverified  Adverse Reaction, Severe, "DEPLETED POTASSIUM 

AND MAGNESIUM. CARDIAC ARREST" PT WIFE, 10/15/17)





Current Inpatient Medications





Current Inpatient Medications








 Medications


  (Trade)  Dose


 Ordered  Sig/Richard


 Route  Start Time


 Stop Time Status Last Admin


Dose Admin


 


 Heparin Sodium


  (Porcine)


  (Heparin Sq 5000


 Unit/0.5ml)  5,000 unit  Q8


 SQ  10/15/17 14:00


 11/14/17 13:59  10/18/17 14:10


5,000 UNIT


 


 Insulin Aspart


  (novoLOG ASPART)  **SLIDING


 SCALE**


 **If C...  ACHS


 SC  10/15/17 11:00


 11/14/17 10:59  10/18/17 12:49


7 UNITS


 


 Glucose


  (Glucose 40% Gel)  15-30


 GRAMS 15


 GRAMS...  UD  PRN


 PO  10/15/17 10:45


 11/14/17 10:44   


 


 


 Glucose


  (Glucose Chew


 Tab)  4-8


 Tablets 4


 Tabl...  UD  PRN


 PO  10/15/17 10:45


 11/14/17 10:44   


 


 


 Dextrose


  (Dextrose 50%


 50ML Syringe)  25-50ML OF


 50% DW IV


 FOR...  UD  PRN


 IV  10/15/17 10:45


 11/14/17 10:44   


 


 


 Glucagon


  (Glucagon Inj)  1 mg  UD  PRN


 SQ  10/15/17 10:45


 11/14/17 10:44   


 


 


 Amlodipine


 Besylate


  (Norvasc Tab)  10 mg  DAILY


 PO  10/16/17 09:00


 11/15/17 08:59  10/18/17 08:06


10 MG


 


 Clopidogrel


 Bisulfate


  (plAVix TAB)  75 mg  DAILY


 PO  10/16/17 09:00


 11/15/17 08:59  10/18/17 08:07


75 MG


 


 Duloxetine HCl


  (Cymbalta Cap)  30 mg  HS


 PO  10/15/17 21:00


 11/14/17 20:59  10/17/17 20:31


30 MG


 


 Duloxetine HCl


  (Cymbalta Cap)  60 mg  DAILY


 PO  10/16/17 09:00


 11/15/17 08:59  10/18/17 08:07


60 MG


 


 Imipramine HCl


  (Tofranil Tab)  50 mg  HS


 PO  10/15/17 21:00


 11/14/17 20:59  10/17/17 20:34


50 MG


 


 Imipramine HCl


  (Tofranil Tab)  25 mg  QAM


 PO  10/16/17 09:00


 11/15/17 08:59  10/18/17 08:06


25 MG


 


 Levothyroxine


 Sodium


  (Synthroid Tab)  50 mcg  DAILYBB


 PO  10/16/17 06:30


 11/15/17 06:59  10/18/17 05:18


50 MCG


 


 Magnesium Oxide


  (Mag-Ox Tab)  400 mg  BID


 PO  10/15/17 21:00


 11/14/17 20:59  10/18/17 08:06


400 MG


 


 Metoprolol


 Succinate


  (Toprol Xl Tab)  37.5 mg  HS


 PO  10/15/17 21:00


 11/14/17 20:59  10/17/17 20:34


37.5 MG


 


 Multivitamins


  (Multivitamin


 Tab)  1 tab  DAILY


 PO  10/16/17 09:00


 11/15/17 08:59  10/18/17 08:07


1 TAB


 


 Pantoprazole


 Sodium


  (Protonix Tab)  40 mg  DAILY


 PO  10/16/17 09:00


 11/15/17 08:59  10/18/17 08:07


40 MG


 


 Pregabalin


  (Lyrica Cap)  150 mg  BID


 PO  10/15/17 21:00


 11/14/17 20:59  10/18/17 08:17


150 MG


 


 Rosuvastatin


 Calcium


  (Crestor Tab)  40 mg  DAILY


 PO  10/16/17 09:00


 11/15/17 08:59  10/18/17 08:07


40 MG


 


 Bupropion HCl


  (Wellbutrin-Sr


 Tab)  150 mg  BID


 PO  10/15/17 21:00


 11/14/17 20:59  10/18/17 08:07


150 MG


 


 Miscellaneous


 Information


  (Order Awaiting


 Action)  1 ea  QS


 N/A  10/15/17 16:00


 11/14/17 15:59   


 


 


 Sodium Chloride


  (Ocean Nasal


 Spray)  1 sprays  PRN  PRN


 NA  10/15/17 16:45


 11/14/17 16:44   


 


 


 Promethazine HCl


 12.5 mg/Sodium


 Chloride  50.5 ml @ 


 204 mls/hr  Q6H  PRN


 IV  10/15/17 22:15


 11/14/17 22:14  10/15/17 22:29


204 MLS/HR


 


 Albuterol/


 Ipratropium


  (Combivent


 Respimat Inh)  1 puffs  QID


 INH  10/17/17 13:00


 11/16/17 12:59  10/18/17 12:49


1 PUFFS


 


 Miscellaneous


 Information


  (Consult


 Glycemic


 Management


 Pharmacy)  1 ea  UD  PRN


 N/A  10/17/17 09:45


 11/16/17 09:44   


 


 


 Isosorbide


 Dinitrate


  (Isordil Tab)  5 mg  TID@0700,1200,1700


 PO  10/17/17 12:00


 11/16/17 11:59  10/18/17 08:07


5 MG


 


 Insulin Glargine


  (Lantus Solostar


 Pen)  20 units  BID


 SC  10/17/17 11:30


 11/16/17 11:29  10/18/17 08:12


20 UNITS


 


 Aspirin


  (Ecotrin Tab)  81 mg  DAILY


 PO  10/18/17 09:00


 11/17/17 08:59  10/18/17 08:06


81 MG











Physical Exam


Vital Signs (Past 24 Hrs):











  Date Time  Temp Pulse Resp B/P (MAP) Pulse Ox O2 Delivery O2 Flow Rate FiO2


 


10/18/17 11:20 36.7 62 18 142/70 (94) 94   


 


10/18/17 07:34 36.8 61 20 138/76 (96) 96   


 


10/18/17 07:30     96 Nasal Cannula 2.0 


 


10/18/17 04:12 36.7 68 20 156/75 (102) 96 Nasal Cannula 2.0 


 


10/18/17 04:00      Nasal Cannula 2.0 


 


10/18/17 00:04      Nasal Cannula 2.0 


 


10/17/17 23:29 36.5 70 20 149/84 (105) 96 Room Air  


 


10/17/17 20:00     95 Nasal Cannula 2.0 


 


10/17/17 19:44 36.8 78 20 150/79 (102) 94 Nasal Cannula 2.0 


 


10/17/17 16:00     95 Nasal Cannula 2.0 


 


10/17/17 15:28 36.9 70 18 168/75 (106) 95 Nasal Cannula 2.0 








Physical Exam:


Constitutional: appearance nourished, obese on 2 L O2


Ears, Nose, Mouth and Throat: mucous membranes moist, no injection and skin 

normal, eyes normal


Cardiovascular: normal S-1 and S-2 and regular rate and rhythm


Respiratory: course breath sounds


Musculoskeletal right foot ambulation of all digits. left hand digit amputation 


Skin: no stigmata of neurocutaneous disease noted and normal and intact


Eyes: extraocular muscles intact (EOMI) and pupils equal, round and reactive to 

light (PERRL)





NEUROLOGIC EXAMINATION: 


Mental status: 


Alert and interactive


Oriented to full date and location


Oriented to person


Speech fluent with no evidence of aphasia





Cranial Nerves


smile eye brow raise symmetric, tongue midline





Reflexes:


Deep tendon reflexes were symmetrical and graded 2/5. 





Sensory: 


absent sensation to pin prick bilaterally to knee. absent sensation to knee 

with vibration





Coordination: 


finger to nose without bi pass, no resting tremor, jaw tremor + tremor with 

intension both arms and legs, 





Gait/Stance:


Posture lying in bed with sitting up shaking of legs and arms





Motor:


Negative for pronator drift of out stretched arms with eyes closed.





Strength:


Normal - 5/5 all extremities





Laboratory Results


Past 24 Hours:


10/18/17 09:45

















Test


  10/18/17


09:45 10/18/17


11:26


 


Anion Gap


  5.0 mmol/L


(3-11) 


 


 


Est Creatinine Clear Calc


Drug Dose 32.1 ml/min 


  


 


 


Estimated GFR (


American) 26.3 


  


 


 


Estimated GFR (Non-


American 22.7 


  


 


 


BUN/Creatinine Ratio 12.0 (10-20)  


 


Calcium Level


  8.4 mg/dl


(8.5-10.1) 


 


 


Bedside Glucose


  


  144 mg/dl


(70-99)











Imaging


no new imaging





Impression


72 year old male with shaking tremor with movement





Plan


1. CK normal 


2. statin started about 1 year ago when tremor started 


3. unstable gate which is multi factorial. 


4. no resting tremor or cog wheeling 


5. out patient EMG may be helpful


6. PT/OT for balance issues and ambulatory issues


7. b12 folate ammonia


8. MRI combo to r/o cerebellar atrophy


further recommendations to follow





I have seen and discussed above patient with Dr Gilles Wills, neurology





I have seen this man and reviewed his history and examination  several years of 

tremor and what appears to be elements of myoclonus/asterixis and postural 

change indcrease in generalized tremor along with a significant and 

presumptively diabetic polyneuropathy and gait disturbance  He is on an 

impressive combination of ssri, tricyclics, buproprion and pregabalin for 

unclear reasons and some of the movements may reflect a low grade serotonin 

excess but there may well b some essential tremor [id number].  This is not 

parkinsons. Recommend the labs as above including ammonia and imaging of brain 

to check for cerebellar atrophy and an eeg to check for cortical myoclonus You 

might want to have psychiatry assess the current mix of medications and assess 

whether these are needed for his depression and whether some of these might be 

tapered off Gilles Wills MD

## 2017-10-18 NOTE — PHARMACY PROGRESS NOTE
Glycemic Control Progress Note


Date of Service


Oct 18, 2017.





Scope


Glycemic Pharmacist consulted for glycemic control to write orders per [facility] 

inpatient glycemic control protocol.





Objective


Accuchecks BSG (last 24hrs):











Test


  10/17/17


10:54 10/17/17


16:04 10/17/17


20:15 10/18/17


02:16


 


Bedside Glucose


  269 mg/dl


(70-99) 75 mg/dl


(70-99) 122 mg/dl


(70-99) 107 mg/dl


(70-99)


 


Test


  10/18/17


07:20 10/18/17


09:45 


  


 


 


Bedside Glucose


  113 mg/dl


(70-99) 


  


  


 








HbA1c:











Test


  10/16/17


06:08


 


Hemoglobin A1c


  7.0 %


(4.5-5.6)  H











Recent Pertinent Medications


Outpatient Anti-diabetic Regimen: 


* Lantus 52 units in the morning and evening plus Novolog 29 units AC and 

correction factor of 1 unit for every 50 mg/dL over 150 mg/dL








The patient is currently receiving:


* Basal insulin:              Lantus 20 units every 12 hours





* Correctional Insulin:     Novolog Correction per scale ACHS


                            Goal Range: Low 110 mg/dL - High 140 mg/dL


                            Correction Factor: 20 mg/dL/unit





* Prandial insulin:           Per carb ratio of 1 unit per 7 grams CHO consumed








Risk Factors for Insulin Resistance: 


* Diet: type 2 diabetic diet





Outpatient Anti-Diabetic Meds


see above





Assessment & Plan


ASSESSMENT:


* ADA & AACE recommend a goal blood sugar range 140-180 mg/dl for the majority 

of critically ill & non-critically ill patients.  However, more stringent 

targets may be selected in individual cases.  Will utilize more stringent goal 

of 110-140 mg/dl based on patient age & comorbidities. Additionally, tighter 

glycemic control is warranted to facilitate infection healing.


* Mr Valdes is a 73 y/o M with a complex cardiac PMH history including CABG x 2 

and cardiac arrest with VFib and subsequent ACID placement who was admitted 

with SBO. He was initially placed on a reduced Lantus dose of 45 units twice 

daily (takes 52 units twice daily at home) - yesterday's fasting blood sugar 

was 64 mg/dL. The prior day, the patient had received 97 units of insulin ... 

This was reduced by 20% and a total daily dose of 80 units was estimated.


* Lantus 20 units twice daily was started yesterday. Fasting this morning was 

113 mg/dL... will continue Lantus dose. Most likely this will increase as the 

patient still has some effects from Lantus 45 units twice daily on board. 


* Post-prandial blood sugars are within range. Patient received no additional 

coverage overnight. Continue current parameters.





PLAN FOR INPATIENT GLYCEMIC CONTROL:





* Basal insulin with LANTUS 20 units SQ BID





* Correctional Insulin with NOVOLOG per scale ACHS or Q6hrs while NPO


 * Goal Range:  Low 110 mg/dL - High 140 mg/dL


 * Correction Factor: 20 mg/dL/unit


 * Nutritional / Prandial insulin per carb ratio of 1 unit per 7 grams CHO 

consumed





OUTPATIENT RECOMMENDATIONS


* Patient's HbA1C within goal for patient (goal for patient is 7.6-8.0% based 

upon Elements of Diabetes Care Scoring Scale) .... as long as patient has no 

hypoglycemia at home it is reasonable to continue current regimen.





Thank you.

## 2017-10-19 VITALS
DIASTOLIC BLOOD PRESSURE: 75 MMHG | TEMPERATURE: 98.06 F | OXYGEN SATURATION: 96 % | SYSTOLIC BLOOD PRESSURE: 151 MMHG | HEART RATE: 62 BPM

## 2017-10-19 VITALS
TEMPERATURE: 98.06 F | DIASTOLIC BLOOD PRESSURE: 84 MMHG | SYSTOLIC BLOOD PRESSURE: 156 MMHG | HEART RATE: 71 BPM | OXYGEN SATURATION: 94 %

## 2017-10-19 VITALS
OXYGEN SATURATION: 95 % | HEART RATE: 62 BPM | SYSTOLIC BLOOD PRESSURE: 154 MMHG | DIASTOLIC BLOOD PRESSURE: 73 MMHG | TEMPERATURE: 98.06 F

## 2017-10-19 VITALS
TEMPERATURE: 97.7 F | OXYGEN SATURATION: 97 % | DIASTOLIC BLOOD PRESSURE: 79 MMHG | HEART RATE: 56 BPM | SYSTOLIC BLOOD PRESSURE: 150 MMHG

## 2017-10-19 VITALS
SYSTOLIC BLOOD PRESSURE: 144 MMHG | OXYGEN SATURATION: 92 % | TEMPERATURE: 98.06 F | HEART RATE: 72 BPM | DIASTOLIC BLOOD PRESSURE: 73 MMHG

## 2017-10-19 VITALS — OXYGEN SATURATION: 98 % | HEART RATE: 62 BPM

## 2017-10-19 VITALS
SYSTOLIC BLOOD PRESSURE: 152 MMHG | TEMPERATURE: 98.06 F | HEART RATE: 68 BPM | DIASTOLIC BLOOD PRESSURE: 75 MMHG | OXYGEN SATURATION: 96 %

## 2017-10-19 VITALS
HEART RATE: 68 BPM | OXYGEN SATURATION: 93 % | SYSTOLIC BLOOD PRESSURE: 147 MMHG | TEMPERATURE: 97.88 F | DIASTOLIC BLOOD PRESSURE: 85 MMHG

## 2017-10-19 VITALS — OXYGEN SATURATION: 98 % | HEART RATE: 61 BPM

## 2017-10-19 VITALS — OXYGEN SATURATION: 96 %

## 2017-10-19 VITALS — OXYGEN SATURATION: 95 %

## 2017-10-19 LAB
ANION GAP SERPL CALC-SCNC: 7 MMOL/L (ref 3–11)
ARTERIAL PATENCY WRIST A: (no result)
BASE EXCESS BLDA CALC-SCNC: 2.7 MEQ/L (ref -9–1.8)
BUN SERPL-MCNC: 31 MG/DL (ref 7–18)
BUN/CREAT SERPL: 13.3 (ref 10–20)
CALCIUM SERPL-MCNC: 8.2 MG/DL (ref 8.5–10.1)
CHLORIDE SERPL-SCNC: 107 MMOL/L (ref 98–107)
CO2 SERPL-SCNC: 26 MMOL/L (ref 21–32)
CREAT CL PREDICTED SERPL C-G-VRATE: 36.2 ML/MIN
CREAT SERPL-MCNC: 2.36 MG/DL (ref 0.6–1.4)
GLUCOSE SERPL-MCNC: 115 MG/DL (ref 70–99)
HCO3 BLDA-SCNC: 27 MMOL/L (ref 19–24)
O2 ADMINISTRATION: (no result)
PO2 BLDA: 86 MM/HG (ref 80–95)
POTASSIUM SERPL-SCNC: 3.8 MMOL/L (ref 3.5–5.1)
SAO2 % BLDA: 95.9 % (ref 90–95)
SODIUM SERPL-SCNC: 140 MMOL/L (ref 136–145)

## 2017-10-19 RX ADMIN — HEPARIN SODIUM SCH UNIT: 10000 INJECTION, SOLUTION INTRAVENOUS; SUBCUTANEOUS at 21:15

## 2017-10-19 RX ADMIN — IPRATROPIUM BROMIDE AND ALBUTEROL SCH PUFFS: 20; 100 SPRAY, METERED RESPIRATORY (INHALATION) at 12:57

## 2017-10-19 RX ADMIN — ISOSORBIDE DINITRATE SCH MG: 5 TABLET ORAL at 12:57

## 2017-10-19 RX ADMIN — IPRATROPIUM BROMIDE AND ALBUTEROL SCH PUFFS: 20; 100 SPRAY, METERED RESPIRATORY (INHALATION) at 21:06

## 2017-10-19 RX ADMIN — LACTULOSE SCH GM: 10 SOLUTION ORAL at 15:07

## 2017-10-19 RX ADMIN — DOXYCYCLINE HYCLATE SCH MG: 100 CAPSULE, GELATIN COATED ORAL at 21:16

## 2017-10-19 RX ADMIN — BUPROPION HYDROCHLORIDE SCH MG: 150 TABLET, EXTENDED RELEASE ORAL at 09:38

## 2017-10-19 RX ADMIN — IMIPRAMINE HYDROCHLORIDE SCH MG: 25 TABLET, FILM COATED ORAL at 09:39

## 2017-10-19 RX ADMIN — PREGABALIN SCH MG: 150 CAPSULE ORAL at 21:15

## 2017-10-19 RX ADMIN — DOXYCYCLINE HYCLATE SCH MG: 100 CAPSULE, GELATIN COATED ORAL at 10:18

## 2017-10-19 RX ADMIN — INSULIN GLARGINE SCH UNITS: 100 INJECTION, SOLUTION SUBCUTANEOUS at 21:14

## 2017-10-19 RX ADMIN — Medication SCH TAB: at 09:38

## 2017-10-19 RX ADMIN — DULOXETINE SCH MG: 60 CAPSULE, DELAYED RELEASE PELLETS ORAL at 09:38

## 2017-10-19 RX ADMIN — HEPARIN SODIUM SCH UNIT: 10000 INJECTION, SOLUTION INTRAVENOUS; SUBCUTANEOUS at 15:11

## 2017-10-19 RX ADMIN — LEVOTHYROXINE SODIUM SCH MCG: 50 TABLET ORAL at 05:56

## 2017-10-19 RX ADMIN — ISOSORBIDE DINITRATE SCH MG: 5 TABLET ORAL at 08:51

## 2017-10-19 RX ADMIN — INSULIN ASPART SCH UNITS: 100 INJECTION, SOLUTION INTRAVENOUS; SUBCUTANEOUS at 08:50

## 2017-10-19 RX ADMIN — CLOPIDOGREL BISULFATE SCH MG: 75 TABLET, FILM COATED ORAL at 09:39

## 2017-10-19 RX ADMIN — HEPARIN SODIUM SCH UNIT: 10000 INJECTION, SOLUTION INTRAVENOUS; SUBCUTANEOUS at 05:55

## 2017-10-19 RX ADMIN — FENOFIBRATE SCH MG: 200 CAPSULE ORAL at 09:41

## 2017-10-19 RX ADMIN — Medication SCH MG: at 21:16

## 2017-10-19 RX ADMIN — INSULIN ASPART SCH UNITS: 100 INJECTION, SOLUTION INTRAVENOUS; SUBCUTANEOUS at 16:30

## 2017-10-19 RX ADMIN — ROSUVASTATIN CALCIUM SCH MG: 20 TABLET, FILM COATED ORAL at 09:39

## 2017-10-19 RX ADMIN — IPRATROPIUM BROMIDE AND ALBUTEROL SCH PUFFS: 20; 100 SPRAY, METERED RESPIRATORY (INHALATION) at 16:40

## 2017-10-19 RX ADMIN — LACTULOSE SCH GM: 10 SOLUTION ORAL at 21:07

## 2017-10-19 RX ADMIN — DULOXETINE SCH MG: 30 CAPSULE, DELAYED RELEASE PELLETS ORAL at 21:08

## 2017-10-19 RX ADMIN — METOPROLOL SUCCINATE SCH MG: 25 TABLET, EXTENDED RELEASE ORAL at 21:16

## 2017-10-19 RX ADMIN — BUPROPION HYDROCHLORIDE SCH MG: 150 TABLET, EXTENDED RELEASE ORAL at 21:16

## 2017-10-19 RX ADMIN — AMLODIPINE BESYLATE SCH MG: 5 TABLET ORAL at 09:40

## 2017-10-19 RX ADMIN — PANTOPRAZOLE SCH MG: 40 TABLET, DELAYED RELEASE ORAL at 09:38

## 2017-10-19 RX ADMIN — INSULIN ASPART SCH UNITS: 100 INJECTION, SOLUTION INTRAVENOUS; SUBCUTANEOUS at 21:15

## 2017-10-19 RX ADMIN — Medication SCH MG: at 09:39

## 2017-10-19 RX ADMIN — Medication SCH MG: at 21:15

## 2017-10-19 RX ADMIN — Medication SCH MG: at 09:38

## 2017-10-19 RX ADMIN — IPRATROPIUM BROMIDE AND ALBUTEROL SCH PUFFS: 20; 100 SPRAY, METERED RESPIRATORY (INHALATION) at 09:40

## 2017-10-19 RX ADMIN — PREGABALIN SCH MG: 150 CAPSULE ORAL at 10:19

## 2017-10-19 RX ADMIN — INSULIN ASPART SCH UNITS: 100 INJECTION, SOLUTION INTRAVENOUS; SUBCUTANEOUS at 13:02

## 2017-10-19 RX ADMIN — ISOSORBIDE DINITRATE SCH MG: 5 TABLET ORAL at 16:39

## 2017-10-19 NOTE — PROGRESS NOTE
Medicine Progress Note


Date & Time of Visit:


Oct 19, 2017 at 10:56.


Subjective


RN Olivia reported that patient was lethargic this morning


seen at the bedside, alert, oriented x 2, answers all questions appropriately


states night was ok, this morning, was sitting up at the edge of the bed, 

having breakfast but fell backwards due to shaking


denies chest pain, dyspnea, dizziness


no other symptoms





Objective





Last 8 Hrs








  Date Time  Temp Pulse Resp B/P (MAP) Pulse Ox O2 Delivery O2 Flow Rate FiO2


 


10/19/17 08:00      Nasal Cannula 2.0 


 


10/19/17 07:55 36.5 56 18 150/79 (102) 97 Nasal Cannula 2.0 


 


10/19/17 04:12 36.7 72 18 144/73 (96) 92 Nasal Cannula 2.0 


 


10/19/17 04:00      Nasal Cannula 2.0 








Physical Exam:


General- oriented x 3, not in distress, speaks in sentences with no effort





Eyes- anicteric





Neck- no JVD





Lungs- clear BS bilaterally, no rales/wheezes





Heart- regular rhythm; no murmur, normal rate





Abdomen- normal bowel sounds, soft, nontender





Extremities- no pretibial edema, no calf tenderness; peripheral pulses intact





Neuro- alert, oriented x 3;no gross focal deficits





Skin- warm & dry


Laboratory Results:





Last 24 Hours








Test


  10/18/17


11:26 10/18/17


15:26 10/18/17


16:30 10/18/17


16:49


 


Bedside Glucose 144 mg/dl   81 mg/dl  


 


Vitamin B12 Level  377 pg/mL   


 


Folate  9.97 ng/mL   


 


Ammonia    42.0 umol/L 


 


Test


  10/18/17


20:01 10/19/17


05:23 10/19/17


07:47 10/19/17


10:20


 


Bedside Glucose 121 mg/dl   139 mg/dl  


 


Sodium Level  140 mmol/L   


 


Potassium Level  3.8 mmol/L   


 


Chloride Level  107 mmol/L   


 


Carbon Dioxide Level  26 mmol/L   


 


Anion Gap  7.0 mmol/L   


 


Blood Urea Nitrogen  31 mg/dl   


 


Creatinine  2.36 mg/dl   


 


Est Creatinine Clear Calc


Drug Dose 


  36.2 ml/min 


  


  


 


 


Estimated GFR (


American) 


  30.7 


  


  


 


 


Estimated GFR (Non-


American 


  26.5 


  


  


 


 


BUN/Creatinine Ratio  13.3   


 


Random Glucose  115 mg/dl   


 


Calcium Level  8.2 mg/dl   


 


Arterial Blood pH    7.43 


 


Arterial Blood Partial


Pressure CO2 


  


  


  42 mmHg 


 


 


Arterial Blood Partial


Pressure O2 


  


  


  86 mm/Hg 


 


 


Arterial Blood HCO3    27 mmol/L 


 


Arterial Blood Oxygen


Saturation 


  


  


  95.9 % 


 


 


Arterial Blood Base Excess    2.7 mEq/L 


 


Arterial Blood Gas Delivery    2 L 


 


Angel Test    POS 











Assessment & Plan


This is a 72 year old male with a PMH of CAD s/p CABGx2, Hx. of Cardiac Arrest 

and V-Fib s/p AICD placement, insulin dependent DM2 with complications 

including peripheral vascular disease s/p R foot transmetatarsal amputation, 

CKD stage IV, SABRA on nocturnal O2, Hypothyroidism, Mood Disorder with 

depression - presents with shortness of breath





ACUTE ON LIKELY CHRONIC DIASTOLIC CHF, Resolved


- echo noted


 clinically improved with Lasix one dose


  Isordil TID added


  will need Lasix PRN 2-3x a week, PRN for CHF symptoms, but need to closely 

watch crea


  remains euvolemic, monitor





- possible component of Acute Bronchitis?


 on Doxycycline Day 3/7


- appreciate Cardio SVC input





TREMORS/SHAKES


- unclear etiology





- r/o seizure


  EEG pending





- r/o Cerebellar Atrophy


  cannot perform MRI as patient has pacemaker


  CT head w/o contrast ordered





- from multiple psych meds?


  Psyh consulted





- from SABRA/ non adherence of CPAP?


  encouraged CPAP use and patient agreed





- from elevated Ammonia?


  check Liver US


  start lactulose


  will consult GI





-appreciate Neuro recommendations


 anticipate d/c to Rehab





DM 2


patient is on a high dose of insulin, takes 52 units twice daily of Lantus as 

well as a sliding scale of Novolog


a1c 7.0


- (+) hypoglycemia the other day


- Lantus decreased


 appreciate Pharm SVC recommendations





HTN


improving


Isordil added


continue monitoring





CKD stage IV


patient states that he was to have further kidney w/up


- crea trending up, baseline 2.4


 today 2.3


- will need to monitor crea


- Nephrology consulted





Hx. of CAD s/p CABGx2


Hx. of cardiac arrest and V-Fib s/p AICD


cardiac markers negative


echo noted


on Plavix, Aspirin





SABRA


did not tolerate CPAP


uses 2L O2 nocturnally


check nocturnal pulse ox as SOB is worse at night


-- needs at least O2 supplement at HS


  discussed in detail with patient today re: importance of CPAP use and 

consequences of not using it


  he agrees to try CPAP again 


  will order CPAP as inpatient while sleeping





Hypothyroidism


continue current dose of Synthroid





Mood Disorder and Depression


Psych consulted for medication review





DVT ppx


subq heparin





FULL CODE





Disposition


PT recommending Rehab


anticipate d/c to Rehab when cleared by Neurology


Current Inpatient Medications:





Current Inpatient Medications








 Medications


  (Trade)  Dose


 Ordered  Sig/Richard


 Route  Start Time


 Stop Time Status Last Admin


Dose Admin


 


 Heparin Sodium


  (Porcine)


  (Heparin Sq 5000


 Unit/0.5ml)  5,000 unit  Q8


 SQ  10/15/17 14:00


 11/14/17 13:59  10/19/17 05:55


5,000 UNIT


 


 Insulin Aspart


  (novoLOG ASPART)  **SLIDING


 SCALE**


 **If C...  ACHS


 SC  10/15/17 11:00


 11/14/17 10:59  10/19/17 08:50


5 UNITS


 


 Glucose


  (Glucose 40% Gel)  15-30


 GRAMS 15


 GRAMS...  UD  PRN


 PO  10/15/17 10:45


 11/14/17 10:44   


 


 


 Glucose


  (Glucose Chew


 Tab)  4-8


 Tablets 4


 Tabl...  UD  PRN


 PO  10/15/17 10:45


 11/14/17 10:44   


 


 


 Dextrose


  (Dextrose 50%


 50ML Syringe)  25-50ML OF


 50% DW IV


 FOR...  UD  PRN


 IV  10/15/17 10:45


 11/14/17 10:44   


 


 


 Glucagon


  (Glucagon Inj)  1 mg  UD  PRN


 SQ  10/15/17 10:45


 11/14/17 10:44   


 


 


 Amlodipine


 Besylate


  (Norvasc Tab)  10 mg  DAILY


 PO  10/16/17 09:00


 11/15/17 08:59  10/19/17 09:40


10 MG


 


 Clopidogrel


 Bisulfate


  (plAVix TAB)  75 mg  DAILY


 PO  10/16/17 09:00


 11/15/17 08:59  10/19/17 09:39


75 MG


 


 Duloxetine HCl


  (Cymbalta Cap)  30 mg  HS


 PO  10/15/17 21:00


 11/14/17 20:59  10/18/17 21:55


30 MG


 


 Duloxetine HCl


  (Cymbalta Cap)  60 mg  DAILY


 PO  10/16/17 09:00


 11/15/17 08:59  10/19/17 09:38


60 MG


 


 Imipramine HCl


  (Tofranil Tab)  50 mg  HS


 PO  10/15/17 21:00


 11/14/17 20:59  10/18/17 21:56


50 MG


 


 Imipramine HCl


  (Tofranil Tab)  25 mg  QAM


 PO  10/16/17 09:00


 11/15/17 08:59  10/19/17 09:39


25 MG


 


 Levothyroxine


 Sodium


  (Synthroid Tab)  50 mcg  DAILYBB


 PO  10/16/17 06:30


 11/15/17 06:59  10/19/17 05:56


50 MCG


 


 Magnesium Oxide


  (Mag-Ox Tab)  400 mg  BID


 PO  10/15/17 21:00


 11/14/17 20:59  10/19/17 09:39


400 MG


 


 Metoprolol


 Succinate


  (Toprol Xl Tab)  37.5 mg  HS


 PO  10/15/17 21:00


 11/14/17 20:59  10/18/17 21:56


37.5 MG


 


 Multivitamins


  (Multivitamin


 Tab)  1 tab  DAILY


 PO  10/16/17 09:00


 11/15/17 08:59  10/19/17 09:38


1 TAB


 


 Pantoprazole


 Sodium


  (Protonix Tab)  40 mg  DAILY


 PO  10/16/17 09:00


 11/15/17 08:59  10/19/17 09:38


40 MG


 


 Pregabalin


  (Lyrica Cap)  150 mg  BID


 PO  10/15/17 21:00


 11/14/17 20:59  10/19/17 10:19


150 MG


 


 Rosuvastatin


 Calcium


  (Crestor Tab)  40 mg  DAILY


 PO  10/16/17 09:00


 11/15/17 08:59  10/19/17 09:39


40 MG


 


 Bupropion HCl


  (Wellbutrin-Sr


 Tab)  150 mg  BID


 PO  10/15/17 21:00


 11/14/17 20:59  10/19/17 09:38


150 MG


 


 Sodium Chloride


  (Ocean Nasal


 Spray)  1 sprays  PRN  PRN


 NA  10/15/17 16:45


 11/14/17 16:44   


 


 


 Promethazine HCl


 12.5 mg/Sodium


 Chloride  50.5 ml @ 


 204 mls/hr  Q6H  PRN


 IV  10/15/17 22:15


 11/14/17 22:14  10/15/17 22:29


204 MLS/HR


 


 Albuterol/


 Ipratropium


  (Combivent


 Respimat Inh)  1 puffs  QID


 INH  10/17/17 13:00


 11/16/17 12:59  10/19/17 09:40


1 PUFFS


 


 Miscellaneous


 Information


  (Consult


 Glycemic


 Management


 Pharmacy)  1 ea  UD  PRN


 N/A  10/17/17 09:45


 11/16/17 09:44   


 


 


 Isosorbide


 Dinitrate


  (Isordil Tab)  5 mg  TID@0700,1200,1700


 PO  10/17/17 12:00


 11/16/17 11:59  10/19/17 08:51


5 MG


 


 Aspirin


  (Ecotrin Tab)  81 mg  DAILY


 PO  10/18/17 09:00


 11/17/17 08:59  10/19/17 09:38


81 MG


 


 Fenofibrate


  (Tricor)  200 mg  QAM


 PO  10/19/17 09:00


 11/18/17 08:59  10/19/17 09:41


200 MG


 


 Insulin Glargine


  (Lantus Solostar


 Pen)  SEE


 PROTOCOL


 TEXT PLEASE  BID


 SC  10/19/17 21:00


 11/18/17 20:59   


 


 


 Doxycycline


 Hyclate


  (Vibramycin Cap)  100 mg  BID


 PO  10/19/17 10:00


 10/24/17 23:59  10/19/17 10:18


100 MG

## 2017-10-19 NOTE — PSYCHIATRIC CONSULTATION
Consultation


Date of Consultation


Oct 19, 2017.





Identifying Data


72-year-old gentleman with multiple medical conditions including coronary 

artery disease status post CABG 2, AICD, insulin-dependent diabetes with 

peripheral vascular disease and right foot amputation, chronic kidney disease 

stage IV, obstructive sleep apnea on O2, hypothyroidism, who presented to the 

hospital with shortness of breath and a sense of pain in his throat for the 3 

weeks prior to admission.  We are consulted to evaluate possible medication 

side effects contributing to muscle shaking.  Information is gathered from the 

electronic medical record, the patient and from Ju Fierro PA-C.  All are 

considered to be reliable





Chief Complaint


"I'm fine".





History of Present Illness


72-year-old gentleman with the above-mentioned past medical history, who 

presented to the hospital with increasing shortness of breath and neck pain.  

During his stay he demonstrated some odd jerking motions especially when 

attempting to get up from a lying position and thus neurology was consulted.  

They cannot identify a specific cause for his jerkiness and I therefore 

wondering about the impact of his multiple psychiatric medications.  He is 

currently prescribed Wellbutrin 150 mg twice a day, Cymbalta 60 mg a.m., 30 mg 

at bedtime, we're to 150 mg twice a day, Tofranil 25 mg twice a day.  When I 

see the patient today he is lying in bed.  He is wearing oxygen, watching TV.  

There is no evidence of tremors or abnormal muscle movements.  He is alert and 

cooperative with the interview.  He is a poor historian with respect to his 

medicines, admittedly he says he allows his wife who is apparently an LPN at a 

nursing home, to put his pills and  and he just takes them.  He denies that 

he has had any mood problems now or in the past in today's describes his mood 

as "all right".  He denies being anxious.  He says he has good sleep and "great

" appetite.  He denies ever having had hallucinations or any evidence of 

thought disorder or bipolar disorder.  When asked to get up from a lying 

position, he uses his left arm to push himself up and during this motion his 

left arm jerks but does not give way.  Once seated and moved to the edge of the 

bed this muscle jerking ceases.  He is able to sit at the side of the bed with 

stability.  There is no cogwheeling present in his upper extremities.





Past Psychiatric History


Current OP Treatment:  no current treatment


Prior OP Treatment:  no prior treatment


Prior Psych Hospitalizations:  none


Access to a Gun:  Yes (locked)


Suicide Attempts:  No


Past Medication Trials


He doesn't know





Past Medical/Surgical History





(1) Diabetes


(2) Stage 4 chronic kidney disease


(3) Hypertension


(4) SOB (shortness of breath)





Allergies


Allergies:  


Coded Allergies:  


     Moxifloxacin (Unverified  Adverse Reaction, Severe, "DEPLETED POTASSIUM 

AND MAGNESIUM. CARDIAC ARREST" PT WIFE, 10/15/17)





Home Medications


Scheduled


Allopurinol (Zyloprim), 300 MG PO DAILY


Amlodipine (Norvasc), 10 MG PO DAILY


Aspirin (Aspirin Ec), 81 MG PO DAILY


Bupropion Hcl (Smoking [place] (Bupropion Hcl Sr), 1 TAB PO BID


Cholecalciferol (Vitamin D3), 2 TAB PO DAILY


Clopidogrel (Plavix), 75 MG PO DAILY


Duloxetine HCl (Cymbalta), 1 CAP PO HS


Duloxetine Hcl (Cymbalta), 60 MG PO DAILY


Fenofibrate (Tricor), 200 MG PO DAILY


Imipramine Hcl (Tofranil), 25 MG PO QAM


Imipramine Hcl (Tofranil), 2 TAB PO HS


Insulin Aspart (Novolog), 29 UNITS SC AC


Insulin Glargine (Lantus Solostar), 52 UNITS SC AMPM


Levothyroxine Sodium (Levothyroxine Sodium), 1 TAB PO DAILYBB


Magnesium Oxide (Mag-Ox), 400 MG PO BID


Metoprolol Succ (Toprol Xl) (Toprol-Xl), 1.5 TAB PO HS


Multivitamin (Multivitamin), 1 TAB PO DAILY


Nitroglycerin (Nitrostat), 0.4 MG UT PRN


Omega-3 Fatty Acids (Fish Oil 1200 mg), 2 CAP PO BID


Pantoprazole (Protonix), 40 MG PO DAILY


Pregabalin (Lyrica), 150 MG PO BID


Rosuvastatin Calcium (Crestor), 40 MG PO DAILY





Scheduled PRN


Oxymetazoline Hcl (Afrin 0.05% Nasal Spray), 2 SPRAYS ESTEVAN BID PRN for Nasal 

Congestion





Miscellaneous Medications


Home O2 Therapy (Oxygen), 2 LITERS NA





Family History


History of Suicide:  No


History of Substance Abuse:  Yes (father and 2 brothers suffered with alcoholism

)


Psychiatric History:  No





Alcohol Use


Alcohol Use In Past 12 Months:  No





Smoking Use


She is 1 can of snuff every 2 days





Personal History


Lives in:  [facility] with his wife


Childhood:


Raised by both parents


Education:  started high school (completed 10th grade)


Work History:


[employer]


Relationship History:  


Children:  4 boys ranging in age from 31-51


Spiritual Affiliation:  Buddhist


Legal History:  none


Psychological Trauma History:  Denies Hx Traumatic Event





Review of Systems


Constitutional:  denies no symptoms reported, denies see HPI, denies chills, 

denies diaphoresis, denies fever, denies malaise, denies weakness, denies other


Eyes:  denies: no symptoms, as stated in HPI, eye pain, tearing, itching, 

redness, discharge, double vision, visual changes, blurred vision, photophobia, 

other


ENT:  denies: no symptoms reported, see HPI, ear pain, ear discharge, loss of 

hearing, tinnitus, nasal pain, nasal congestion, rhinorrhea, epistaxis, sore 

throat, stidor, throat swelling, mouth pain, mouth swelling, dental pain, gum 

swelling, other


Cardiovascular:  denies: no symptoms reported, see HPI, chest pain, chest 

tightness, chest pressure, diaphoresis, palpitations, syncope, other


Respiratory:  reports: short of breath


Gastrointestinal:  denies no symptoms reported, denies see HPI, denies 

abdominal pain, denies constipation, denies diarrhea, denies nausea, denies 

vomiting, denies other


Genitourinary - Male:  denies: no symptoms, see HPI, rash, amenorrhea, penile 

itching, penile discharge, testicular pain, testicular swelling, impotence, 

other


Musculoskeletal:  other (denies pain.  Demonstrates jerking muscle movement of 

arms with intention)


Integumentary:  denies no symptoms reported, denies see HPI, denies change in 

color, denies change in hair/nails, denies dryness, denies lesions, denies lumps

, denies rash, denies other


Neurologic:  reports: other (peripheral neuropathy)


Endocrine:  denies: no symptoms, as stated in HPI, cold intolerance, heat 

intolerance, hair changes, goiter, polydipsia, polyuria, skin changes, other


Hematologic / Lymphatic:  denies: no symptoms, as stated in HPI, abnormal 

clotting, adenopathy, anemia, easy bleeding, easy bruising, gums bleeding, 

petechiae, other





Examination


Physical Examination


As per Anny JOHNSON





Vital Signs





Vital Signs Past 12 Hours








  Date Time  Temp Pulse Resp B/P (MAP) Pulse Ox O2 Delivery O2 Flow Rate FiO2


 


10/19/17 13:40     96 Nasal Cannula 2.0 


 


10/19/17 12:30 36.7 68 18 152/75 (100) 96 Nasal Cannula 2.0 


 


10/19/17 11:28     95 Nasal Cannula 2.0 


 


10/19/17 11:22 36.7 62 18 154/73 (100) 95 Room Air  


 


10/19/17 08:00      Nasal Cannula 2.0 


 


10/19/17 07:55 36.5 56 18 150/79 (102) 97 Nasal Cannula 2.0 


 


10/19/17 04:12 36.7 72 18 144/73 (96) 92 Nasal Cannula 2.0 


 


10/19/17 04:00      Nasal Cannula 2.0 











Laboratory Results





Last 24 Hours








Test


  10/18/17


15:26 10/18/17


16:30 10/18/17


16:49 10/18/17


20:01


 


Vitamin B12 Level 377 pg/mL    


 


Folate 9.97 ng/mL    


 


Bedside Glucose  81 mg/dl   121 mg/dl 


 


Ammonia   42.0 umol/L  


 


Test


  10/19/17


05:23 10/19/17


07:47 10/19/17


10:20 10/19/17


12:10


 


Sodium Level 140 mmol/L    


 


Potassium Level 3.8 mmol/L    


 


Chloride Level 107 mmol/L    


 


Carbon Dioxide Level 26 mmol/L    


 


Anion Gap 7.0 mmol/L    


 


Blood Urea Nitrogen 31 mg/dl    


 


Creatinine 2.36 mg/dl    


 


Est Creatinine Clear Calc


Drug Dose 36.2 ml/min 


  


  


  


 


 


Estimated GFR (


American) 30.7 


  


  


  


 


 


Estimated GFR (Non-


American 26.5 


  


  


  


 


 


BUN/Creatinine Ratio 13.3    


 


Random Glucose 115 mg/dl    


 


Calcium Level 8.2 mg/dl    


 


Bedside Glucose  139 mg/dl   125 mg/dl 


 


Arterial Blood pH   7.43  


 


Arterial Blood Partial


Pressure CO2 


  


  42 mmHg 


  


 


 


Arterial Blood Partial


Pressure O2 


  


  86 mm/Hg 


  


 


 


Arterial Blood HCO3   27 mmol/L  


 


Arterial Blood Oxygen


Saturation 


  


  95.9 % 


  


 


 


Arterial Blood Base Excess   2.7 mEq/L  


 


Arterial Blood Gas Delivery   2 L  


 


Angel Test   POS  











Mental Examination


During interview pt is:  alert and oriented, cooperative


Appearance:  appropriately dressed


Eye contact is:  good


Motor behavior is:  other (jerking muscle movements of left arm with attempts 

to get up out of bed)


Speech:  normal in rate, rhythm & volume


Affect:  blunted


Mood is:  other ("all right")


Thought process:  goal directed


Thought content:  reality based without delusions


Suicidal thought are:  denied


Homicidal thoughts are:  denied


Hallucinations:  denies auditory, denies visual


Cognition:  attention grossly intact, language grossly intact, other (poor 

historian for medications)


Intelligence estimated to be:  below average


Insight:  fair


Judgement:  fair





Impression / Recommendations


Impression


72-year-old gentleman with multiple medical conditions previously described, 

admitted with shortness of breath and throat pain.  We are consulted to assist 

with medication management.  I have visited the patient with Ju JOHNSON from neurology to observe his jerking arm movements.  They seem to be 

limited to intentional use of those arms, specifically when getting up out of 

bed.  He otherwise has no resting or tremors of intention.  There is no 

cogwheeling.  Ju reports that his gait is within normal limits, state 

Station is within normal limits.  There were concerns about serotonin syndrome 

but I would not see that this would be limited to a circumscribed situation.  I 

certainly understand that he is on multiple medications that have both 

psychiatric use as well as pain abuse.  He denies that he is depressed and does 

not know why he was started on these medicines and so in an effort to reduce 

the polypharmacy, I will suggest that we reduce some of his medications to see 

if it has impacted these jerking movements.  He is on Wellbutrin 150 mg twice a 

day and getting a dose at bedtime I'm going to cut that in half to 150 mg a.m. 

only.  He is on a divided dose of Cymbalta and I will reduce that to 60 mg in 

the morning only.  He is on Tofranil 25 mg twice a day.  This can make him 

somewhat tired during the day and in an effort to rule out muscle fatigue as 

part of this picture, we will reduce the Tofranil to 25 mg at bedtime only.  

Certainly the norepinephrine anergic medicines can contribute to tremor 

although he does not demonstrate as I said a resting or intentional tremor.  He 

is in agreement with this.  Our liaison nurse will attempt to contact his wife 

hope after her work today as she will probably have a better understanding of 

why all of the medicines were started.  Ju is in agreement with this plan 

of action as is the patient.





Risk Factors Assessment


Male:  Yes


:  Yes


/single/:  No


Higher / Fall in social status:  No


Access to guns:  Yes


Health problems:  Yes


Mental Health Diagnoses:  No


Substance use disorders:  No


Previous attempt:  No


Hopelessness:  No





Protective Factors Assessment


Mormonism beliefs:  Yes


:  Yes


Responsible for young children:  No


Employed:  No


Stable relationships:  Yes


Supportive family:  Yes





Recommendations





(1) Tremor due to multiple drugs


10/19


   - Reduce Wellbutrin to 150 mg. AM


   - Reduce Cymbalta to 60 mg. AM


   - Reduce Tofranil to 25 mg. HS only


   - If pain or neuropathic symptoms emerge, will re-evaluate


   - Will follow





Has been reviewed with Dr. Karine Cerna

## 2017-10-19 NOTE — DIAGNOSTIC IMAGING REPORT
CT HEAD WITHOUT CONTRAST (CT)



CLINICAL HISTORY: tremors    



COMPARISON STUDY:  No previous studies for comparison.



TECHNIQUE:  Axial CT of the brain is performed from the vertex to the skull

base. IV contrast was not administered for this examination. A dose lowering

technique was utilized adhering to the principles of ALARA.

 



CT DOSE: 614.27 mGy.cm



FINDINGS:



No intra or extra-axial mass lesions are visualized. There is no CT evidence of

acute cortical infarction. There is no evidence of midline shift. There is no

acute  hemorrhage. No calvarial fractures are visualized. 

There are patchy white matter hypodensities likely on a small vessel basis.

There are bilateral basal ganglia lacunar infarcts.

There is no evidence of pathologic ventricular dilatation.

There is no evidence of acute sinusitis



IMPRESSION: 

1. No evidence of intracranial mass in this noncontrast study

2. Foci of decreased attenuation within the white matter likely a small vessel

basis. Bilateral basal ganglia lacunar infarcts

3. No acute intracranial findings







Electronically signed by:  Zen Thrasher M.D.

10/19/2017 11:49 AM



Dictated Date/Time:  10/19/2017 11:48 AM

## 2017-10-19 NOTE — GASTROINTESTINAL CONSULTATION
Gastrointestinal Consultation


Date of Consultation:  Oct 19, 2017


Attending Physician:  Dr. Padilla


Consulting Physician:  Dr. Cutler


Reason for Consultation:  Elevated Ammonia


History of Present Illness


Patient is a 72 year old male patient of Dr. Mccormick with a hx of CAD, CABG, 

with AICD, DM-2, Sleep apnea,  hypothyroidism, CKD IV presented to the ED on 10/

15 for SOB which has since greatly improved. GI is consulted this morning for 

elevated ammonia. Apparently, the patient was witnessed to have some tremulous 

or seizure type activity this morning, then ammonia was drawn showing ammonia 

42 (normal to 32). 





He is seen and examined while he is resting in bed. He is awake, alert, oriented

, able to provide a detailed history. He denies any hx of liver disease/

cirrhosis, jaundice, confusion. Although he has undergone endoscopy previously 

through [facility], he has never been seen in the [facility] 

clinic. A review of labs shows normal LFTs, normal INR, normal platelets.





Past Medical/Surgical History


Medical Problems:


(1) Hypertension


Status: Acute  





(2) Hypoxia


Status: Acute  





(3) SOB (shortness of breath)


Status: Acute  





Past Medical History:


1. CAD s/p CABGx2


2. Cardiac Arrest


3. V-Fib s/p AICD placement


4. Insulin dependent DM2 


5. PVD


6. CKD stage IV


7. SABRA on nocturnal O2


8. Hypothyroidism


9. Mood Disorder with depression.


Past Surgical History:


1. CABD


2. AICD placement





Social History


Drug Use:  none


Marital Status:  


Housing Status:  lives with significant other





Allergies


Coded Allergies:  


     Moxifloxacin (Unverified  Adverse Reaction, Severe, "DEPLETED POTASSIUM 

AND MAGNESIUM. CARDIAC ARREST" PT WIFE, 10/15/17)





Current Medications





Home Meds and Scripts








 Medications  Dose


 Route/Sig


 Max Daily Dose Days Date Category Dose


Instructions


 


 Nitrostat


  (Nitroglycerin)


 0.4 Mg Tab  0.4 Mg


 UT PRN


    10/15/17 Reported 


 


 Fish Oil 1200 mg


  (Omega-3 Fatty


 Acids) 1 Cap Cap  2 Cap


 PO BID


    10/15/17 Reported 


 


 Multivitamin


  (Multivitamins)


 Tab  1 Tab


 PO DAILY


    10/15/17 Reported 


 


 Afrin 0.05% Nasal


 Spray


  (Oxymetazoline


 Hcl) 1 Btl Spray  2 Sprays


 ESTEVAN BID PRN


    10/15/17 Reported 


 


 Mag-Ox (Magnesium


 Oxide) 400 Mg Tab  400 Mg


 PO BID


    10/15/17 Reported 


 


 Oxygen  Gas  2 Liters


 NA


    10/15/17 Reported 


 


 Vitamin D3


  (Cholecalciferol)


 1,000 Unit Tab  2 Tab


 PO DAILY


    10/15/17 Reported 


 


 Aspirin Ec


  (Aspirin) 81 Mg


 Tab  81 Mg


 PO DAILY


    10/15/17 Reported 


 


 Plavix


  (Clopidogrel


 Bisulfate) 75 Mg


 Tab  75 Mg


 PO DAILY


    10/15/17 Reported 


 


 Toprol-Xl


  (Metoprolol


 Succinate) 25 Mg


 Tabcr  1.5 Tab


 PO HS


    10/15/17 Reported 


 


 Novolog (Insulin


 Aspart) 100


 Units/Ml Inj  29 Units


 SC AC


    10/15/17 Reported  PLUS ADDITIONAL 2 UNITS FOR EACH 50 ABOVE 150


 


 Zyloprim


  (Allopurinol) 300


 Mg Tab  300 Mg


 PO DAILY


    10/15/17 Reported 


 


 Tofranil


  (Imipramine Hcl)


 25 Mg Tab  2 Tab


 PO HS


    10/15/17 Reported 


 


 Tofranil


  (Imipramine Hcl)


 25 Mg Tab  25 Mg


 PO QAM


    10/15/17 Reported 


 


 Lantus Solostar


  (Insulin


 Glargine) 100


 Unit/Ml Inj  52 Units


 SC AMPM


    10/15/17 Reported 


 


 Levothyroxine


 Sodium 50 Mcg Tab  1 Tab


 PO DAILYBB


    10/15/17 Reported 


 


 Crestor


  (Rosuvastatin


 Calcium) 40 Mg Tab  40 Mg


 PO DAILY


    10/15/17 Reported 


 


 Bupropion Hcl Sr


  (Bupropion Hcl


  (Smoking [place])


 150 Mg Tab  1 Tab


 PO BID


    10/15/17 Reported 


 


 Tricor


  (Fenofibrate) 200


 Mg Cap  200 Mg


 PO DAILY


    10/15/17 Reported 


 


 Norvasc


  (Amlodipine


 Besylate) 10 Mg


 Tab  10 Mg


 PO DAILY


    10/15/17 Reported 


 


 Lyrica


  (Pregabalin) 150


 Mg Cap  150 Mg


 PO BID


    10/15/17 Reported 


 


 Protonix


  (Pantoprazole


 Sodium) 40 Mg Tab  40 Mg


 PO DAILY


    10/15/17 Reported 


 


 Cymbalta


  (Duloxetine HCl)


 30 Mg Cap  1 Cap


 PO HS


    10/15/17 Reported 


 


 Cymbalta


  (Duloxetine Hcl)


 60 Mg Cap  60 Mg


 PO DAILY


    10/15/17 Reported 











Review of Systems


Constitutional:  No fever, No chills, No sweats, No weight loss, No weakness


Eyes:  No eye pain, No redness


ENT:  No sore throat, No trouble swallowing, No pain on swallowing


Respiratory:  + shortness of breath, No cough, No wheezing, No dyspnea on 

exertion


Cardiac:  No chest pain, No edema, No palpitations


Abdomen:  + see HPI


Neuro:  + problem reported (tremor), No memory loss, No weakness, No numbness/

tingling, No vertigo, No balance problems


Psych:  No depression symptoms, No anxiety, No insomnia


Heme:  No abnormal bleeding/bruising, No night sweats


Endo:  No excessive thirst, No excessive urination


Skin:  No rash, No itch, No new/changing skin lesions, No jaundice





Physical Exam











  Date Time  Temp Pulse Resp B/P (MAP) Pulse Ox O2 Delivery O2 Flow Rate FiO2


 


10/19/17 13:40     96 Nasal Cannula 2.0 


 


10/19/17 12:30 36.7 68 18 152/75 (100) 96 Nasal Cannula 2.0 


 


10/19/17 11:28     95 Nasal Cannula 2.0 


 


10/19/17 11:22 36.7 62 18 154/73 (100) 95 Room Air  


 


10/19/17 08:00      Nasal Cannula 2.0 


 


10/19/17 07:55 36.5 56 18 150/79 (102) 97 Nasal Cannula 2.0 


 


10/19/17 04:12 36.7 72 18 144/73 (96) 92 Nasal Cannula 2.0 


 


10/19/17 04:00      Nasal Cannula 2.0 


 


10/19/17 00:00      Nasal Cannula 2.0 


 


10/18/17 23:47 36.6 74 18 147/63 (91) 95 Nasal Cannula 2.0 


 


10/18/17 20:00     95 Nasal Cannula 2.0 


 


10/18/17 19:42 36.8 72 20 110/58 (75) 94 Nasal Cannula 2.0 


 


10/18/17 16:00     95 Nasal Cannula 2.0 


 


10/18/17 15:01 37.0 66 19 164/88 (113) 93  2.0 








General Appearance:  no apparent distress


Eyes:  normal inspection, EOMI


Neck:  supple, no adenopathy, thyroid normal


Respiratory/Chest:  chest non-tender, lungs clear, normal breath sounds, no 

accessory muscle use


Cardiovascular:  regular rate, rhythm, no JVD, no murmur


Abdomen:  normal bowel sounds, non tender, soft, no organomegaly


Extremities:  normal inspection, no pedal edema, normal capillary refill


Neurologic/Psych:  alert, normal mood/affect, oriented x 3, + pertinent finding 

(mild tremor or the hands, no flap/asterixes)


Skin:  normal color, no jaundice, warm/dry, no rash





Laboratory Results





Last 24 Hours








Test


  10/18/17


15:26 10/18/17


16:30 10/18/17


16:49 10/18/17


20:01


 


Vitamin B12 Level 377 pg/mL    


 


Folate 9.97 ng/mL    


 


Bedside Glucose  81 mg/dl   121 mg/dl 


 


Ammonia   42.0 umol/L  


 


Test


  10/19/17


05:23 10/19/17


07:47 10/19/17


10:20 10/19/17


12:10


 


Sodium Level 140 mmol/L    


 


Potassium Level 3.8 mmol/L    


 


Chloride Level 107 mmol/L    


 


Carbon Dioxide Level 26 mmol/L    


 


Anion Gap 7.0 mmol/L    


 


Blood Urea Nitrogen 31 mg/dl    


 


Creatinine 2.36 mg/dl    


 


Est Creatinine Clear Calc


Drug Dose 36.2 ml/min 


  


  


  


 


 


Estimated GFR (


American) 30.7 


  


  


  


 


 


Estimated GFR (Non-


American 26.5 


  


  


  


 


 


BUN/Creatinine Ratio 13.3    


 


Random Glucose 115 mg/dl    


 


Calcium Level 8.2 mg/dl    


 


Bedside Glucose  139 mg/dl   125 mg/dl 


 


Arterial Blood pH   7.43  


 


Arterial Blood Partial


Pressure CO2 


  


  42 mmHg 


  


 


 


Arterial Blood Partial


Pressure O2 


  


  86 mm/Hg 


  


 


 


Arterial Blood HCO3   27 mmol/L  


 


Arterial Blood Oxygen


Saturation 


  


  95.9 % 


  


 


 


Arterial Blood Base Excess   2.7 mEq/L  


 


Arterial Blood Gas Delivery   2 L  


 


Angel Test   POS  











Impression


Patient is a 72 year old male with a mildly elevated ammonia. Doubt this 

represents hepatic encephalopathy because no hx of cirrhosis and no suggestion 

of cirrhosis as platelet levels are normal. He is also mentally clear w/o any 

evidence of confusion, slow mentation and is not lethargic or somnolent. His 

tremor or seizure like activity this morning is more likely related to a 

neurological issue than liver.





Plan


1. RUQ ultrasound to r/o nodular contour to the liver. 


2. Recommend against checking ammonia levels and not accurate unless an 

arterial draw and run stat. Also only of clinical significance if the pt has 

cirrhosis. 


3. GI will watch peripherally. 





I have seen , examined and agree with the plan as outlined by BEATA Duarte  as above.





-exam reveals soft abd


-No asterixis, low suspicion for Liver disease


-arrange gi f/u

## 2017-10-19 NOTE — PHARMACY PROGRESS NOTE
Glycemic Control Progress Note


Date of Service


Oct 19, 2017.





Scope


Glycemic Pharmacist consulted for glycemic control to write orders per [facility] 

inpatient glycemic control protocol.





Objective


Accuchecks BSG (last 24hrs):











Test


  10/18/17


09:45 10/18/17


11:26 10/18/17


16:30 10/18/17


20:01


 


Random Glucose


  144 mg/dl


(70-99) 


  


  


 


 


Bedside Glucose


  


  144 mg/dl


(70-99) 81 mg/dl


(70-99) 121 mg/dl


(70-99)


 


Test


  10/19/17


05:23 10/19/17


07:47 


  


 


 


Random Glucose


  115 mg/dl


(70-99) 


  


  


 


 


Bedside Glucose


  


  139 mg/dl


(70-99) 


  


 








HbA1c:











Test


  10/16/17


06:08


 


Hemoglobin A1c


  7.0 %


(4.5-5.6)  H











Recent Pertinent Medications


Outpatient Anti-diabetic Regimen: 


* Lantus 52 units in the morning and evening plus Novolog 29 units AC and 

correction factor of 1 unit for every 50 mg/dL over 150 mg/dL








The patient is currently receiving:


* Basal insulin:              Lantus 20 units every 12 hours





* Correctional Insulin:     Novolog Correction per scale ACHS


                            Goal Range: Low 110 mg/dL - High 140 mg/dL


                            Correction Factor: 20 mg/dL/unit





* Prandial insulin:           Per carb ratio of 1 unit per 8 grams CHO consumed








Risk Factors for Insulin Resistance: 


* Diet: type 2 diabetic diet





Outpatient Anti-Diabetic Meds


see above





Assessment & Plan


ASSESSMENT:


* ADA & AACE recommend a goal blood sugar range 140-180 mg/dl for the majority 

of critically ill & non-critically ill patients.  However, more stringent 

targets may be selected in individual cases.  Will utilize more stringent goal 

of 110-140 mg/dl based on patient age & comorbidities. Additionally, tighter 

glycemic control is warranted to facilitate infection healing.


* Mr Valdes is a 71 y/o M with a complex cardiac PMH history including CABG x 2 

and cardiac arrest with VFib and subsequent ACID placement who was admitted 

with SBO. He was initially placed on a reduced Lantus dose of 45 units twice 

daily (takes 52 units twice daily at home) - which produced a fasting blood 

sugar of 64 mg/dL. That day, the patient had received 97 units of insulin ... 

This was reduced by 20% and a total daily dose of 80 units was estimated.


* Lantus 20 units twice daily was initially started. Today's fasting blood 

sugar was 139 mg/dL (115 mg/dL on the PRP). There is currently a downward trend 

in morning blood sugars and I suspect that this is no longer from the Lantus 45 

units SQ BID. The patient's regimen is also very basal heavy. Will reduce dose 

to Lantus 18 units SQ x 1 this morning and then establish a sliding scale for 

the evening.


* Post-prandial blood sugars are within range. Carbohydrate ratio loosened 

slightly as the patient has a downwards trend yesterday. Will monitor closely.





PLAN FOR INPATIENT GLYCEMIC CONTROL:





* Basal insulin with LANTUS 18 units SQ x 1 then LANTUS 15-18 units SQ BID (

Lantus 15 units if blood sugar less than 140 mg/dL and 18 units if blood sugar 

140 mg/dL or greater)





* Correctional Insulin with NOVOLOG per scale ACHS or Q6hrs while NPO


 * Goal Range:  Low 110 mg/dL - High 140 mg/dL


 * Correction Factor: 20 mg/dL/unit


 * Nutritional / Prandial insulin per carb ratio of 1 unit per 8 grams CHO 

consumed





OUTPATIENT RECOMMENDATIONS


* Patient's HbA1C within goal for patient (goal for patient is 7.6-8.0% based 

upon Elements of Diabetes Care Scoring Scale) .... as long as patient has no 

hypoglycemia at home it is reasonable to continue current regimen.





Thank you.

## 2017-10-19 NOTE — ELECTROENCEPHALOGRAPH REPORT
CLINICAL DIAGNOSIS:  Myoclonus/asterixis and tremor, question cortically

originating discharges.

 

ELECTROENCEPHALOGRAM DIAGNOSIS:  Essentially normal during wakefulness.

 

DESCRIPTION OF TRACING:  This EEG was done as a bedside recording.  No

activation procedures were utilized.  Video analysis of patient movement and

behavior was obtained.

 

Under these conditions, there is evidence for a background rhythm in the

alpha range of up to 9-10 Hz of maximum frequency and 30 microvolts of

maximum amplitude.  This is maximum in posterior head regions and bilaterally

symmetrical.  Polymorphic mid frequency theta activity intermixed with

occasional waveforms in the delta range is seen over all head regions without

clear focal or regional predominance.  Anterior head region maximum

bilaterally symmetrical low voltage fast activity in the beta range is

present.

 

Video recording does capture a lot of head movements and tremulousness, but

none of this is reflected on the EEG and there is certainly no evidence for

potentially epileptogenic activity, triphasic waves or other evidence for

cortical irritability that might correlate with the presence of the abnormal

involuntary movements.

 

INTERPRETATION:  This study is essentially normal during wakefulness without

evidence for focal or generalized encephalopathy, potentially epileptogenic

activity or indeed any cortically originating activity that might correlate

with the abnormal involuntary movements.

## 2017-10-19 NOTE — NEUROLOGY PROGRESS NOTES
Neurology Progress Note


Date of Service


Oct 19, 2017.





Jyothi Alvarado is a 72 year old male with a PMH of CAD s/p CABGx2, Hx. of Cardiac 

Arrest and V-Fib s/p AICD placement, insulin dependent DM2 with complications 

including peripheral vascular disease s/p R foot transmetatarsal amputation, 

CKD stage IV, SABRA on nocturnal O2, Hypothyroidism, Mood Disorder with 

depression. He presented to the hospital admission date 10/15/17  with 

shortness of breath. He was having shortness of breath and "throat pain" for 

the past three weeks. He has seen his primary care physician; and was given 

antibiotics at that time. He said he never fully recovered.  He uses O2 

nocturnally due to sleep apnea (did not tolerate CPAP mask); and he turned it 

up to 3L of O2 with no help. He also developed some chest/throat pain. He 

states he started having shaking episodes about 1 year ago and it is when he is 

trying to get up from lying or from sitting to standing. At one point he was on 

gabapentin but it was briefly and he doesn't really remember taking it or why 

it was stopped. 


Today he is being seen by Gricelda COTA from psychiatry. She is reviewing his 

medications and is going to have then reviewed by his wife as to when they were 

started along with what he is taking them for because he is uncertain. denies 

swallowing difficulty, CP, SOB, abdominal pain, one sided weakness, numbness 

tingling, N, V, wet pillows in am, bowel or bladder issues





Objective











  Date Time  Temp Pulse Resp B/P (MAP) Pulse Ox O2 Delivery O2 Flow Rate FiO2


 


10/19/17 13:40     96 Nasal Cannula 2.0 


 


10/19/17 12:30 36.7 68 18 152/75 (100) 96 Nasal Cannula 2.0 


 


10/19/17 11:28     95 Nasal Cannula 2.0 


 


10/19/17 11:22 36.7 62 18 154/73 (100) 95 Room Air  


 


10/19/17 08:00      Nasal Cannula 2.0 


 


10/19/17 07:55 36.5 56 18 150/79 (102) 97 Nasal Cannula 2.0 


 


10/19/17 04:12 36.7 72 18 144/73 (96) 92 Nasal Cannula 2.0 


 


10/19/17 04:00      Nasal Cannula 2.0 


 


10/19/17 00:00      Nasal Cannula 2.0 


 


10/18/17 23:47 36.6 74 18 147/63 (91) 95 Nasal Cannula 2.0 


 


10/18/17 20:00     95 Nasal Cannula 2.0 


 


10/18/17 19:42 36.8 72 20 110/58 (75) 94 Nasal Cannula 2.0 


 


10/18/17 16:00     95 Nasal Cannula 2.0 


 


10/18/17 15:01 37.0 66 19 164/88 (113) 93  2.0 











Last 24 Hours








Test


  10/18/17


15:26 10/18/17


16:30 10/18/17


16:49 10/18/17


20:01


 


Vitamin B12 Level 377 pg/mL    


 


Folate 9.97 ng/mL    


 


Bedside Glucose  81 mg/dl   121 mg/dl 


 


Ammonia   42.0 umol/L  


 


Test


  10/19/17


05:23 10/19/17


07:47 10/19/17


10:20 10/19/17


12:10


 


Sodium Level 140 mmol/L    


 


Potassium Level 3.8 mmol/L    


 


Chloride Level 107 mmol/L    


 


Carbon Dioxide Level 26 mmol/L    


 


Anion Gap 7.0 mmol/L    


 


Blood Urea Nitrogen 31 mg/dl    


 


Creatinine 2.36 mg/dl    


 


Est Creatinine Clear Calc


Drug Dose 36.2 ml/min 


  


  


  


 


 


Estimated GFR (


American) 30.7 


  


  


  


 


 


Estimated GFR (Non-


American 26.5 


  


  


  


 


 


BUN/Creatinine Ratio 13.3    


 


Random Glucose 115 mg/dl    


 


Calcium Level 8.2 mg/dl    


 


Bedside Glucose  139 mg/dl   125 mg/dl 


 


Arterial Blood pH   7.43  


 


Arterial Blood Partial


Pressure CO2 


  


  42 mmHg 


  


 


 


Arterial Blood Partial


Pressure O2 


  


  86 mm/Hg 


  


 


 


Arterial Blood HCO3   27 mmol/L  


 


Arterial Blood Oxygen


Saturation 


  


  95.9 % 


  


 


 


Arterial Blood Base Excess   2.7 mEq/L  


 


Arterial Blood Gas Delivery   2 L  


 


Angel Test   POS  








Imaging:


CT head - No evidence of intracranial mass in this noncontrast study  Foci of 

decreased attenuation within the white matter likely a small vessel basis. 

Bilateral basal ganglia lacunar infarcts  No acute intracranial findings


Exam:


Physical Exam:


Constitutional:  appearance nourished, healthy and obese


Ears, Nose, Mouth and Throat: mucous membranes moist, no injection and skin 

normal, eyes normal


Cardiovascular: normal S-1 and S-2 and regular rate and rhythm


Respiratory: course breath sounds


Musculoskeletal: no peripheral edema and good distal pulses


Skin: no stigmata of neurocutaneous disease noted and normal and intact


Eyes: extraocular muscles intact (EOMI) and pupils equal, round and reactive to 

light (PERRL)





NEUROLOGIC EXAMINATION: 


Mental status: 


Alert and interactive


Oriented to full date and location


Oriented to person


Speech fluent with no evidence of aphasia





Cranial Nerves


facial symmetry





Reflexes:


Deep tendon reflexes were symmetrical and graded 2/5.  Plantar responses were 

flexor.





Coordination: 


finger to nose with out bi pass, mild reaching tremor





Gait/Stance:


Posture normal. sitting and lying in bed with change of position he has jerking 

movements in all ext. 





Strength:


bicep tricep hand  5/5 bilaterally, hip flex 5/5 bilaterally





Current Inpatient Medications








 Medications


  (Trade)  Dose


 Ordered  Sig/Richard


 Route  Start Time


 Stop Time Status Last Admin


Dose Admin


 


 Heparin Sodium


  (Porcine)


  (Heparin Sq 5000


 Unit/0.5ml)  5,000 unit  Q8


 SQ  10/15/17 14:00


 11/14/17 13:59  10/19/17 05:55


5,000 UNIT


 


 Insulin Aspart


  (novoLOG ASPART)  **SLIDING


 SCALE**


 **If C...  ACHS


 SC  10/15/17 11:00


 11/14/17 10:59  10/19/17 13:02


4 UNITS


 


 Glucose


  (Glucose 40% Gel)  15-30


 GRAMS 15


 GRAMS...  UD  PRN


 PO  10/15/17 10:45


 11/14/17 10:44   


 


 


 Glucose


  (Glucose Chew


 Tab)  4-8


 Tablets 4


 Tabl...  UD  PRN


 PO  10/15/17 10:45


 11/14/17 10:44   


 


 


 Dextrose


  (Dextrose 50%


 50ML Syringe)  25-50ML OF


 50% DW IV


 FOR...  UD  PRN


 IV  10/15/17 10:45


 11/14/17 10:44   


 


 


 Glucagon


  (Glucagon Inj)  1 mg  UD  PRN


 SQ  10/15/17 10:45


 11/14/17 10:44   


 


 


 Amlodipine


 Besylate


  (Norvasc Tab)  10 mg  DAILY


 PO  10/16/17 09:00


 11/15/17 08:59  10/19/17 09:40


10 MG


 


 Clopidogrel


 Bisulfate


  (plAVix TAB)  75 mg  DAILY


 PO  10/16/17 09:00


 11/15/17 08:59  10/19/17 09:39


75 MG


 


 Duloxetine HCl


  (Cymbalta Cap)  30 mg  HS


 PO  10/15/17 21:00


 11/14/17 20:59  10/18/17 21:55


30 MG


 


 Duloxetine HCl


  (Cymbalta Cap)  60 mg  DAILY


 PO  10/16/17 09:00


 11/15/17 08:59  10/19/17 09:38


60 MG


 


 Imipramine HCl


  (Tofranil Tab)  50 mg  HS


 PO  10/15/17 21:00


 11/14/17 20:59  10/18/17 21:56


50 MG


 


 Imipramine HCl


  (Tofranil Tab)  25 mg  QAM


 PO  10/16/17 09:00


 11/15/17 08:59  10/19/17 09:39


25 MG


 


 Levothyroxine


 Sodium


  (Synthroid Tab)  50 mcg  DAILYBB


 PO  10/16/17 06:30


 11/15/17 06:59  10/19/17 05:56


50 MCG


 


 Magnesium Oxide


  (Mag-Ox Tab)  400 mg  BID


 PO  10/15/17 21:00


 11/14/17 20:59  10/19/17 09:39


400 MG


 


 Metoprolol


 Succinate


  (Toprol Xl Tab)  37.5 mg  HS


 PO  10/15/17 21:00


 11/14/17 20:59  10/18/17 21:56


37.5 MG


 


 Multivitamins


  (Multivitamin


 Tab)  1 tab  DAILY


 PO  10/16/17 09:00


 11/15/17 08:59  10/19/17 09:38


1 TAB


 


 Pantoprazole


 Sodium


  (Protonix Tab)  40 mg  DAILY


 PO  10/16/17 09:00


 11/15/17 08:59  10/19/17 09:38


40 MG


 


 Pregabalin


  (Lyrica Cap)  150 mg  BID


 PO  10/15/17 21:00


 11/14/17 20:59  10/19/17 10:19


150 MG


 


 Rosuvastatin


 Calcium


  (Crestor Tab)  40 mg  DAILY


 PO  10/16/17 09:00


 11/15/17 08:59  10/19/17 09:39


40 MG


 


 Bupropion HCl


  (Wellbutrin-Sr


 Tab)  150 mg  BID


 PO  10/15/17 21:00


 11/14/17 20:59  10/19/17 09:38


150 MG


 


 Sodium Chloride


  (Ocean Nasal


 Spray)  1 sprays  PRN  PRN


 NA  10/15/17 16:45


 11/14/17 16:44   


 


 


 Promethazine HCl


 12.5 mg/Sodium


 Chloride  50.5 ml @ 


 204 mls/hr  Q6H  PRN


 IV  10/15/17 22:15


 11/14/17 22:14  10/15/17 22:29


204 MLS/HR


 


 Albuterol/


 Ipratropium


  (Combivent


 Respimat Inh)  1 puffs  QID


 INH  10/17/17 13:00


 11/16/17 12:59  10/19/17 12:57


1 PUFFS


 


 Miscellaneous


 Information


  (Consult


 Glycemic


 Management


 Pharmacy)  1 ea  UD  PRN


 N/A  10/17/17 09:45


 11/16/17 09:44   


 


 


 Isosorbide


 Dinitrate


  (Isordil Tab)  5 mg  TID@0700,1200,1700


 PO  10/17/17 12:00


 11/16/17 11:59  10/19/17 12:57


5 MG


 


 Aspirin


  (Ecotrin Tab)  81 mg  DAILY


 PO  10/18/17 09:00


 11/17/17 08:59  10/19/17 09:38


81 MG


 


 Fenofibrate


  (Tricor)  200 mg  QAM


 PO  10/19/17 09:00


 11/18/17 08:59  10/19/17 09:41


200 MG


 


 Insulin Glargine


  (Lantus Solostar


 Pen)  SEE


 PROTOCOL


 TEXT PLEASE  BID


 SC  10/19/17 21:00


 11/18/17 20:59   


 


 


 Doxycycline


 Hyclate


  (Vibramycin Cap)  100 mg  BID


 PO  10/19/17 10:00


 10/24/17 23:59  10/19/17 10:18


100 MG


 


 Lactulose


  (Chronulac Syrup)  15 gm  TID


 PO  10/19/17 14:00


 11/18/17 13:59   


 











Impression


72 year old male with shaking tremor with movement





Plan


1. CK normal 


2. statin started about 1 year ago when tremor started 


3. unstable gate which is multi factorial. 


4. no resting tremor or cog wheeling 


5. out patient EMG may be helpful


6. PT/OT for balance issues and ambulatory issues


7. b12 folate ammonia ammonia slightly elevated, b12, folate WNL


8. MRI combo to r/o cerebellar atrophy- MRI cancelled


9. CT head -Bilateral basal ganglia lacunar infarcts - would be helpful to have 

MRI with and with contrast


further recommendations to follow





I have seen and discussed above patient with Dr Gilles Wills, neurology





Patient seen and evaluated above discussed wit Ju [id number]  eeg 

normal exam shows some minor tremor ans asterixis/myoclonus today but less so 

than yesterday mri cancelled due to pacer but ct is adequate to assess 

cerebellum and no gross atrophy is seen  ammonia a bit up and psych is on board 

and may taper off the meds a bit and the wife may have to be contacted about 

why many of these meds were started years ago  some may be for neuropathy At 

this point I am reluctant to add anything until we have more data and frankly 

this man will be returning to [facility] for his medical care and follow up but if 

pushed ai would consider rx with mysoline in low doses but only after a trial 

of reducing his combination of  ssri , tca lyrica and welbutrin is completed 

Gilles Wills MD

## 2017-10-19 NOTE — DIAGNOSTIC IMAGING REPORT
ABDOMINAL ULTRASOUND, RIGHT UPPER QUADRANT



HISTORY:      elevated ammonia, r/o liver cirrhosis.



COMPARISON:  None.



FINDINGS:



Pancreas: The pancreatic tail is obscured by overlying bowel gas. The remaining

portions of the pancreas are within normal limits.



Liver: The liver is echogenic consistent with fatty change. 21 cm in length.



Gallbladder: The gallbladder is surgically absent.



CBD: 5 mm.



Right kidney: No hydronephrosis.



IMPRESSION: 



1. Cholecystectomy.

2. Hepatomegaly demonstrating fatty change.







Electronically signed by:  Derrick Estrada M.D.

10/19/2017 2:45 PM



Dictated Date/Time:  10/19/2017 2:44 PM

## 2017-10-20 VITALS
OXYGEN SATURATION: 94 % | HEART RATE: 73 BPM | DIASTOLIC BLOOD PRESSURE: 68 MMHG | TEMPERATURE: 98.06 F | SYSTOLIC BLOOD PRESSURE: 136 MMHG

## 2017-10-20 VITALS
TEMPERATURE: 98.24 F | DIASTOLIC BLOOD PRESSURE: 70 MMHG | HEART RATE: 86 BPM | SYSTOLIC BLOOD PRESSURE: 119 MMHG | OXYGEN SATURATION: 96 %

## 2017-10-20 VITALS
OXYGEN SATURATION: 95 % | TEMPERATURE: 97.7 F | SYSTOLIC BLOOD PRESSURE: 121 MMHG | HEART RATE: 68 BPM | DIASTOLIC BLOOD PRESSURE: 67 MMHG

## 2017-10-20 VITALS — HEART RATE: 73 BPM | OXYGEN SATURATION: 93 %

## 2017-10-20 VITALS
OXYGEN SATURATION: 92 % | SYSTOLIC BLOOD PRESSURE: 177 MMHG | TEMPERATURE: 98.06 F | HEART RATE: 69 BPM | DIASTOLIC BLOOD PRESSURE: 89 MMHG

## 2017-10-20 VITALS — OXYGEN SATURATION: 96 %

## 2017-10-20 VITALS
HEART RATE: 61 BPM | DIASTOLIC BLOOD PRESSURE: 79 MMHG | SYSTOLIC BLOOD PRESSURE: 169 MMHG | OXYGEN SATURATION: 95 % | TEMPERATURE: 97.7 F

## 2017-10-20 VITALS
TEMPERATURE: 97.88 F | OXYGEN SATURATION: 90 % | SYSTOLIC BLOOD PRESSURE: 157 MMHG | DIASTOLIC BLOOD PRESSURE: 83 MMHG | HEART RATE: 72 BPM

## 2017-10-20 VITALS — HEART RATE: 76 BPM | OXYGEN SATURATION: 93 %

## 2017-10-20 VITALS — HEART RATE: 78 BPM | OXYGEN SATURATION: 95 %

## 2017-10-20 LAB
ANION GAP SERPL CALC-SCNC: 6 MMOL/L (ref 3–11)
BUN SERPL-MCNC: 29 MG/DL (ref 7–18)
BUN/CREAT SERPL: 12.2 (ref 10–20)
CALCIUM SERPL-MCNC: 8.5 MG/DL (ref 8.5–10.1)
CHLORIDE SERPL-SCNC: 106 MMOL/L (ref 98–107)
CO2 SERPL-SCNC: 29 MMOL/L (ref 21–32)
CREAT CL PREDICTED SERPL C-G-VRATE: 35.5 ML/MIN
CREAT SERPL-MCNC: 2.41 MG/DL (ref 0.6–1.4)
GLUCOSE SERPL-MCNC: 141 MG/DL (ref 70–99)
POTASSIUM SERPL-SCNC: 3.7 MMOL/L (ref 3.5–5.1)
SODIUM SERPL-SCNC: 141 MMOL/L (ref 136–145)

## 2017-10-20 RX ADMIN — PREGABALIN SCH MG: 150 CAPSULE ORAL at 07:52

## 2017-10-20 RX ADMIN — PREGABALIN SCH MG: 150 CAPSULE ORAL at 20:30

## 2017-10-20 RX ADMIN — HEPARIN SODIUM SCH UNIT: 10000 INJECTION, SOLUTION INTRAVENOUS; SUBCUTANEOUS at 05:46

## 2017-10-20 RX ADMIN — ROSUVASTATIN CALCIUM SCH MG: 20 TABLET, FILM COATED ORAL at 07:45

## 2017-10-20 RX ADMIN — INSULIN GLARGINE SCH UNITS: 100 INJECTION, SOLUTION SUBCUTANEOUS at 07:57

## 2017-10-20 RX ADMIN — DULOXETINE SCH MG: 60 CAPSULE, DELAYED RELEASE PELLETS ORAL at 07:47

## 2017-10-20 RX ADMIN — HEPARIN SODIUM SCH UNIT: 10000 INJECTION, SOLUTION INTRAVENOUS; SUBCUTANEOUS at 20:33

## 2017-10-20 RX ADMIN — BUPROPION HYDROCHLORIDE SCH MG: 150 TABLET, EXTENDED RELEASE ORAL at 20:30

## 2017-10-20 RX ADMIN — LACTULOSE SCH GM: 10 SOLUTION ORAL at 07:45

## 2017-10-20 RX ADMIN — IPRATROPIUM BROMIDE AND ALBUTEROL SCH PUFFS: 20; 100 SPRAY, METERED RESPIRATORY (INHALATION) at 17:18

## 2017-10-20 RX ADMIN — IPRATROPIUM BROMIDE AND ALBUTEROL SCH PUFFS: 20; 100 SPRAY, METERED RESPIRATORY (INHALATION) at 07:44

## 2017-10-20 RX ADMIN — PANTOPRAZOLE SCH MG: 40 TABLET, DELAYED RELEASE ORAL at 07:47

## 2017-10-20 RX ADMIN — AMLODIPINE BESYLATE SCH MG: 5 TABLET ORAL at 07:48

## 2017-10-20 RX ADMIN — IMIPRAMINE HYDROCHLORIDE SCH MG: 25 TABLET, FILM COATED ORAL at 20:30

## 2017-10-20 RX ADMIN — BUPROPION HYDROCHLORIDE SCH MG: 150 TABLET, EXTENDED RELEASE ORAL at 07:45

## 2017-10-20 RX ADMIN — Medication SCH TAB: at 07:46

## 2017-10-20 RX ADMIN — CLOPIDOGREL BISULFATE SCH MG: 75 TABLET, FILM COATED ORAL at 07:49

## 2017-10-20 RX ADMIN — IPRATROPIUM BROMIDE AND ALBUTEROL SCH PUFFS: 20; 100 SPRAY, METERED RESPIRATORY (INHALATION) at 20:32

## 2017-10-20 RX ADMIN — DOXYCYCLINE HYCLATE SCH MG: 100 CAPSULE, GELATIN COATED ORAL at 07:49

## 2017-10-20 RX ADMIN — Medication SCH MG: at 07:47

## 2017-10-20 RX ADMIN — METOPROLOL SUCCINATE SCH MG: 25 TABLET, EXTENDED RELEASE ORAL at 20:31

## 2017-10-20 RX ADMIN — INSULIN ASPART SCH UNITS: 100 INJECTION, SOLUTION INTRAVENOUS; SUBCUTANEOUS at 20:16

## 2017-10-20 RX ADMIN — DULOXETINE SCH MG: 30 CAPSULE, DELAYED RELEASE PELLETS ORAL at 20:30

## 2017-10-20 RX ADMIN — INSULIN ASPART SCH UNITS: 100 INJECTION, SOLUTION INTRAVENOUS; SUBCUTANEOUS at 07:56

## 2017-10-20 RX ADMIN — LEVOTHYROXINE SODIUM SCH MCG: 50 TABLET ORAL at 05:48

## 2017-10-20 RX ADMIN — INSULIN GLARGINE SCH UNITS: 100 INJECTION, SOLUTION SUBCUTANEOUS at 20:33

## 2017-10-20 RX ADMIN — Medication SCH MG: at 20:30

## 2017-10-20 RX ADMIN — FENOFIBRATE SCH MG: 200 CAPSULE ORAL at 07:48

## 2017-10-20 RX ADMIN — INSULIN ASPART SCH UNITS: 100 INJECTION, SOLUTION INTRAVENOUS; SUBCUTANEOUS at 17:25

## 2017-10-20 RX ADMIN — DOXYCYCLINE HYCLATE SCH MG: 100 CAPSULE, GELATIN COATED ORAL at 20:30

## 2017-10-20 RX ADMIN — Medication SCH MG: at 07:45

## 2017-10-20 RX ADMIN — HEPARIN SODIUM SCH UNIT: 10000 INJECTION, SOLUTION INTRAVENOUS; SUBCUTANEOUS at 14:32

## 2017-10-20 RX ADMIN — ISOSORBIDE DINITRATE SCH MG: 5 TABLET ORAL at 07:44

## 2017-10-20 RX ADMIN — Medication SCH MG: at 20:31

## 2017-10-20 RX ADMIN — IMIPRAMINE HYDROCHLORIDE SCH MG: 25 TABLET, FILM COATED ORAL at 07:48

## 2017-10-20 RX ADMIN — IPRATROPIUM BROMIDE AND ALBUTEROL SCH PUFFS: 20; 100 SPRAY, METERED RESPIRATORY (INHALATION) at 12:27

## 2017-10-20 RX ADMIN — INSULIN ASPART SCH UNITS: 100 INJECTION, SOLUTION INTRAVENOUS; SUBCUTANEOUS at 12:27

## 2017-10-20 NOTE — PHARMACY PROGRESS NOTE
Glycemic Control Progress Note


Date of Service


Oct 20, 2017.





Scope


Glycemic Pharmacist consulted for glycemic control to write orders per [facility] 

inpatient glycemic control protocol.





Objective


Accuchecks BSG (last 24hrs):











Test


  10/19/17


12:10 10/19/17


16:08 10/19/17


20:04 10/20/17


05:01


 


Bedside Glucose


  125 mg/dl


(70-99) 114 mg/dl


(70-99) 119 mg/dl


(70-99) 


 


 


Random Glucose


  


  


  


  141 mg/dl


(70-99)


 


Test


  10/20/17


07:27 


  


  


 


 


Bedside Glucose


  143 mg/dl


(70-99) 


  


  


 








HbA1c:











Test


  10/16/17


06:08


 


Hemoglobin A1c


  7.0 %


(4.5-5.6)  H











Recent Pertinent Medications





The patient is currently receiving:


* Basal insulin:             Lantus 15-18 units every 12 hours





* Correctional Insulin:   Novolog Correction per scale ACHS


                          Goal Range: Low 110 mg/dL - High 140 mg/dL


                          Correction Factor: 20 mg/dL/unit





* Prandial insulin:         Per carb ratio of 1 unit per 8 grams CHO consumed





Outpatient Anti-Diabetic Meds


Outpatient Anti-diabetic Regimen: 


* Lantus 52 units in the morning and evening plus Novolog 29 units AC and 

correction factor of 1 unit for every 50 mg/dL over 150 mg/dL





Assessment & Plan


ASSESSMENT:


* See progress note from 10/19/17 for more background info, in short:


 


* Pt receiving SQ basal bolus insulin regimen for hyperglycemia secondary to 

baseline DM (outpatient regimen on hold) and stress/infection. Patient is 

requiring significantly less insulin compared to outpatient dosing. 





* Patient is currently receiving an average of 45 units of insulin per day


 * 33 units of basal insulin


 * 12 units of prandial/correctional insulin


 * BSGs ranging 114 -  139 mg/dl over the past 24hrs





* Changes needed to insulin regimen: 


 * AM Fasting BSG = 143 mg/dl. This is in slightly above goal range for patient 

based on inpatient targets and co-morbidities. Increase Lantus.


 * Post-prandial BSGs are in range therefore no changes needed to CF/CR at this 

time. 


 * Total daily dose = ~50 units.  May need to evenly re-distribute regimen 50%:

50% basal:prandial to prevent hypo/hyperglycemia. 





PLAN FOR INPATIENT GLYCEMIC CONTROL:





* Basal insulin - increase


 * Lantus 18 units SQ BID





* Bolus insulin 


 * NovoLog per scale ACHS or Q6hrs while NPO


 * Goal Range:  Low 110 mg/dL - High 140 mg/dL


 * Correction Factor: 20 mg/dL/unit


 * Nutritional / Prandial insulin per carb ratio of 1 unit per 8 grams CHO 

consumed





RECOMMENDATIONS FOR DISCHARGE:


* Patient's HbA1C within goal for patient (goal for patient is 7.6-8.0% based 

upon Elements of Diabetes Care Scoring Scale) .... as long as patient has no 

hypoglycemia at home it is reasonable to continue current regimen.





Thank you.

## 2017-10-20 NOTE — NEUROLOGY PROGRESS NOTES
Neurology Progress Note


Date of Service


Oct 20, 2017.





Jyothi Alvarado is a 72 year old male with a PMH of CAD s/p CABGx2, Hx. of Cardiac 

Arrest and V-Fib s/p AICD placement, insulin dependent DM2 with complications 

including peripheral vascular disease s/p R foot transmetatarsal amputation, 

CKD stage IV, SABRA on nocturnal O2, Hypothyroidism, Mood Disorder with 

depression. He presented to the hospital admission date 10/15/17  with 

shortness of breath. He was having shortness of breath and "throat pain" for 

the past three weeks. He has seen his primary care physician; and was given 

antibiotics at that time. He said he never fully recovered.  He uses O2 

nocturnally due to sleep apnea (did not tolerate CPAP mask); and he turned it 

up to 3L of O2 with no help. He also developed some chest/throat pain. He 

states he started having shaking episodes about 1 year ago and it is when he is 

trying to get up from lying or from sitting to standing. At one point he was on 

gabapentin but it was briefly and he doesn't really remember taking it or why 

it was stopped. His wife has a concern that the tremor is progressively getting 

worse and she feels he is on too many medications. denies CP, SOB, abdominal 

pain, weakness, N, V, falls,





Objective











  Date Time  Temp Pulse Resp B/P (MAP) Pulse Ox O2 Delivery O2 Flow Rate FiO2


 


10/20/17 11:17 36.5 61 22 169/79 (109) 95 Nasal Cannula 2.0 


 


10/20/17 08:00     96 Nasal Cannula 2.0 





      BiPAP  


 


10/20/17 07:13 36.7 69 22 177/89 (118) 92 BiPAP  


 


10/20/17 04:41 36.6 72 18 157/83 (107) 90 BiPAP 2.0 


 


10/20/17 04:00      Nasal Cannula 2.0 


 


10/20/17 03:27  73   93  2.0 30


 


10/20/17 02:22  78   95   30


 


10/20/17 00:00      Nasal Cannula 2.0 


 


10/19/17 22:59 36.6 68 18 147/85 (105) 93 BiPAP  


 


10/19/17 22:08  61   98   30


 


10/19/17 20:00      BiPAP  


 


10/19/17 19:10 36.7 62 20 151/75 (100) 96   


 


10/19/17 16:40 36.7 71 18 156/84 (108) 94 Nasal Cannula 2.0 


 


10/19/17 16:00      BiPAP  40


 


10/19/17 15:42  62   98   40











Last 24 Hours








Test


  10/19/17


16:08 10/19/17


20:04 10/20/17


05:01 10/20/17


07:27


 


Bedside Glucose 114 mg/dl  119 mg/dl   143 mg/dl 


 


Sodium Level   141 mmol/L  


 


Potassium Level   3.7 mmol/L  


 


Chloride Level   106 mmol/L  


 


Carbon Dioxide Level   29 mmol/L  


 


Anion Gap   6.0 mmol/L  


 


Blood Urea Nitrogen   29 mg/dl  


 


Creatinine   2.41 mg/dl  


 


Est Creatinine Clear Calc


Drug Dose 


  


  35.5 ml/min 


  


 


 


Estimated GFR (


American) 


  


  30.0 


  


 


 


Estimated GFR (Non-


American 


  


  25.8 


  


 


 


BUN/Creatinine Ratio   12.2  


 


Random Glucose   141 mg/dl  


 


Calcium Level   8.5 mg/dl  


 


Test


  10/20/17


11:20 


  


  


 


 


Bedside Glucose 146 mg/dl    








Imaging:


CT head- No evidence of intracranial mass in this noncontrast study  Foci of 

decreased attenuation within the white matter likely a small vessel basis. 

Bilateral basal ganglia lacunar infarcts  No acute intracranial findings


EEG- INTERPRETATION:  This study is essentially normal during wakefulness 

without evidence for focal or generalized encephalopathy, potentially 

epileptogenic activity or indeed any cortically originating activity that might 

correlate with the abnormal involuntary movements.


Exam:


Physical Exam:


Constitutional: appearance nourished, healthy and obese


Ears, Nose, Mouth and Throat: mucous membranes moist, no injection and skin 

normal, eyes normal


Cardiovascular: normal S-1 and S-2 and regular rate and rhythm


Respiratory:course breath sounds


Musculoskeletal: right digit amputation on LE


Skin: no stigmata of neurocutaneous disease noted and normal and intact


Eyes: extraocular muscles intact (EOMI) and pupils equal, round and reactive to 

light (PERRL)





NEUROLOGIC EXAMINATION: 


Mental status: 


Alert and interactive


Oriented to full date and location


Oriented to person


Speech fluent with no evidence of aphasia





Cranial Nerves


facial symmetry





Coordination: 


Romberg absent





Gait/Stance:


Posture normal.  Gait normal: with steady with steps, base,  tandem gait, using 

walker minimal assist, full body jerking with initiation of movements but stops 

with maintaining position





Motor:


Negative for pronator drift of out stretched arms with eyes closed.





Strength:


biceps triceps hand  5/5, hip flex 5/5





Current Inpatient Medications








 Medications


  (Trade)  Dose


 Ordered  Sig/Richard


 Route  Start Time


 Stop Time Status Last Admin


Dose Admin


 


 Heparin Sodium


  (Porcine)


  (Heparin Sq 5000


 Unit/0.5ml)  5,000 unit  Q8


 SQ  10/15/17 14:00


 11/14/17 13:59  10/20/17 05:46


5,000 UNIT


 


 Insulin Aspart


  (novoLOG ASPART)  **SLIDING


 SCALE**


 **If C...  ACHS


 SC  10/15/17 11:00


 11/14/17 10:59  10/20/17 12:27


5 UNITS


 


 Glucose


  (Glucose 40% Gel)  15-30


 GRAMS 15


 GRAMS...  UD  PRN


 PO  10/15/17 10:45


 11/14/17 10:44   


 


 


 Glucose


  (Glucose Chew


 Tab)  4-8


 Tablets 4


 Tabl...  UD  PRN


 PO  10/15/17 10:45


 11/14/17 10:44   


 


 


 Dextrose


  (Dextrose 50%


 50ML Syringe)  25-50ML OF


 50% DW IV


 FOR...  UD  PRN


 IV  10/15/17 10:45


 11/14/17 10:44   


 


 


 Glucagon


  (Glucagon Inj)  1 mg  UD  PRN


 SQ  10/15/17 10:45


 11/14/17 10:44   


 


 


 Amlodipine


 Besylate


  (Norvasc Tab)  10 mg  DAILY


 PO  10/16/17 09:00


 11/15/17 08:59  10/20/17 07:48


10 MG


 


 Clopidogrel


 Bisulfate


  (plAVix TAB)  75 mg  DAILY


 PO  10/16/17 09:00


 11/15/17 08:59  10/20/17 07:49


75 MG


 


 Duloxetine HCl


  (Cymbalta Cap)  30 mg  HS


 PO  10/15/17 21:00


 11/14/17 20:59  10/19/17 21:08


30 MG


 


 Duloxetine HCl


  (Cymbalta Cap)  60 mg  DAILY


 PO  10/16/17 09:00


 11/15/17 08:59  10/20/17 07:47


60 MG


 


 Imipramine HCl


  (Tofranil Tab)  50 mg  HS


 PO  10/15/17 21:00


 11/14/17 20:59  10/19/17 21:16


50 MG


 


 Imipramine HCl


  (Tofranil Tab)  25 mg  QAM


 PO  10/16/17 09:00


 11/15/17 08:59  10/20/17 07:48


25 MG


 


 Levothyroxine


 Sodium


  (Synthroid Tab)  50 mcg  DAILYBB


 PO  10/16/17 06:30


 11/15/17 06:59  10/20/17 05:48


50 MCG


 


 Magnesium Oxide


  (Mag-Ox Tab)  400 mg  BID


 PO  10/15/17 21:00


 11/14/17 20:59  10/20/17 07:47


400 MG


 


 Metoprolol


 Succinate


  (Toprol Xl Tab)  37.5 mg  HS


 PO  10/15/17 21:00


 11/14/17 20:59  10/19/17 21:16


37.5 MG


 


 Multivitamins


  (Multivitamin


 Tab)  1 tab  DAILY


 PO  10/16/17 09:00


 11/15/17 08:59  10/20/17 07:46


1 TAB


 


 Pantoprazole


 Sodium


  (Protonix Tab)  40 mg  DAILY


 PO  10/16/17 09:00


 11/15/17 08:59  10/20/17 07:47


40 MG


 


 Pregabalin


  (Lyrica Cap)  150 mg  BID


 PO  10/15/17 21:00


 11/14/17 20:59  10/20/17 07:52


150 MG


 


 Rosuvastatin


 Calcium


  (Crestor Tab)  40 mg  DAILY


 PO  10/16/17 09:00


 11/15/17 08:59  10/20/17 07:45


40 MG


 


 Bupropion HCl


  (Wellbutrin-Sr


 Tab)  150 mg  BID


 PO  10/15/17 21:00


 11/14/17 20:59  10/20/17 07:45


150 MG


 


 Sodium Chloride


  (Ocean Nasal


 Spray)  1 sprays  PRN  PRN


 NA  10/15/17 16:45


 11/14/17 16:44   


 


 


 Promethazine HCl


 12.5 mg/Sodium


 Chloride  50.5 ml @ 


 204 mls/hr  Q6H  PRN


 IV  10/15/17 22:15


 11/14/17 22:14  10/15/17 22:29


204 MLS/HR


 


 Albuterol/


 Ipratropium


  (Combivent


 Respimat Inh)  1 puffs  QID


 INH  10/17/17 13:00


 11/16/17 12:59  10/20/17 12:27


1 PUFFS


 


 Miscellaneous


 Information


  (Consult


 Glycemic


 Management


 Pharmacy)  1 ea  UD  PRN


 N/A  10/17/17 09:45


 11/16/17 09:44   


 


 


 Aspirin


  (Ecotrin Tab)  81 mg  DAILY


 PO  10/18/17 09:00


 11/17/17 08:59  10/20/17 07:45


81 MG


 


 Fenofibrate


  (Tricor)  200 mg  QAM


 PO  10/19/17 09:00


 11/18/17 08:59  10/20/17 07:48


200 MG


 


 Doxycycline


 Hyclate


  (Vibramycin Cap)  100 mg  BID


 PO  10/19/17 10:00


 10/24/17 23:59  10/20/17 07:49


100 MG


 


 Isosorbide


 Dinitrate


  (Isordil Tab)  10 mg  BID@0700,1200


 PO  10/20/17 16:00


 11/19/17 15:59   


 


 


 Insulin Glargine


  (Lantus Solostar


 Pen)  18 units  BID


 SC  10/20/17 21:00


 11/19/17 20:59   


 











Impression


72 year old male with shaking tremor with movement





Plan


1. CK normal 


2. tremor started 1 year ago according to wife and she is concerned too many 

medications started


3. unstable gate which is multi factorial. 


4. no resting tremor or cog wheeling 


5. out patient EMG may be helpful


6. PT/OT for balance issues and ambulatory issues


7. b12 folate ammonia ammonia slightly elevated, b12, folate WNL


8. MRI combo to r/o cerebellar atrophy- MRI cancelled- cardiac pacer


9. CT head -Bilateral basal ganglia lacunar infarcts - would be helpful to have 

MRI with and with contrast


10. At last neurology visit he was to taper off the Lyrica 50 per week until 

off. that may be contributing to shaking tremor -psychiatry has started this 

process and has made recommendations. Wife has been consulted and is in 

agreement with plan. base line essential tremor due to acute illness may have 

increased tremor. Will further evaluate medication changes in clinic follow up 


11. EEG no seizure activity


12. will see back in neurology clinic in 3-4 weeks Dr Ju Castellanos or 

Ju Fierro PAC





I have seen and discussed above patient with Dr Gilles Wills, neurology





Above reviewed case discussed with wife who proves to be a much more accurate 

historian and epic records reviewed which confirm the prior diagnoses of 

peripheral neuropathy and tremor but the severity of the latter has clearly 

worsened perhaps in relation to the stress of the acute illness but possibly 

due to medications which were to have been stopped but were not ( lyrica ) I 

will check tomorrow but Ju LEDEZMA and Ju Castellaons MD will 

ahve to see her post discharge  Gilles Wills MD

## 2017-10-20 NOTE — PSYCHIATRIC PROGRESS NOTES
Psychiatric Progress Note


Date of Service


Oct 20, 2017.





Notes


ID: Patient reviewed with liaison nurse. Case reviewed with BEATA Field 

who completed initial consult on 10/19.  Multiple med changes i.e. decreases to 

limit polypharmacy.  


CC: muscle jerking


HPI: 73 y/o male seen for follow-up visit on recent psychiatric medication 

reductions.  Denies issues overnight.  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 

as unchanged, but it also does not bother him at baseline.  Rates mood as "good

".  


ROS: Psych:  denies symptoms other than stated above


Constitutional: denied


Cardiovascular: denied


GI: denied


Neurologic: as above


Remainder of 10 body systems also reviewed and denied other than noted above.


MSE: The patient presented as alert and cooperative.  The patient was dressed 

in gown wearing oxygen.  Eye contact was fair.  No psychomotor restlessness or 

agitation was noted.  Speech was normal in rate, rhythm, and volume.  Affect 

was mood congruent.  The patients mood appeared euthymic.  Thought processes 

were clear, coherent and goal directed without evidence of loose associations 

or flight of ideas.  Thought content/perception was reality based without 

delusions.  The patient denied suicidal and homicidal ideation.  The patient 

denied hallucinations and did not appear to be responding to internal stimuli.


Imp: as per initial consult 


Plan: monitor on lower doses of psych meds.  Ideally tofranil would be tapered 

off as outpatient by current prescriber.

## 2017-10-20 NOTE — PROGRESS NOTE
Medicine Progress Note


Date & Time of Visit:


Oct 20, 2017 at 12:01.


Subjective


seen sitting up in bed, having lunch


states he feels fine today


was able to tolerate Bipap overnight


denies shortness of breath, chest pain, headache


ambulated to the bathroom today with no shakes/tremors





denies other symptoms





Objective





Last 8 Hrs








  Date Time  Temp Pulse Resp B/P (MAP) Pulse Ox O2 Delivery O2 Flow Rate FiO2


 


10/20/17 11:17 36.5 61 22 169/79 (109) 95 Nasal Cannula 2.0 


 


10/20/17 08:00     96 Nasal Cannula 2.0 





      BiPAP  


 


10/20/17 07:13 36.7 69 22 177/89 (118) 92 BiPAP  


 


10/20/17 04:41 36.6 72 18 157/83 (107) 90 BiPAP 2.0 








Physical Exam:


General- oriented x 3, not in distress, speaks in sentences with no effort





Eyes- anicteric





Neck- no JVD





Lungs- (+) mild rales bilateral bases





Heart- regular rhythm; no murmur, normal rate





Abdomen- normal bowel sounds, soft, nontender





Extremities- no pretibial edema, no calf tenderness; peripheral pulses intact





Neuro- alert, oriented x 3;no gross focal deficits





Skin- warm & dry


Laboratory Results:





Last 24 Hours








Test


  10/19/17


12:10 10/19/17


16:08 10/19/17


20:04 10/20/17


05:01


 


Bedside Glucose 125 mg/dl  114 mg/dl  119 mg/dl  


 


Sodium Level    141 mmol/L 


 


Potassium Level    3.7 mmol/L 


 


Chloride Level    106 mmol/L 


 


Carbon Dioxide Level    29 mmol/L 


 


Anion Gap    6.0 mmol/L 


 


Blood Urea Nitrogen    29 mg/dl 


 


Creatinine    2.41 mg/dl 


 


Est Creatinine Clear Calc


Drug Dose 


  


  


  35.5 ml/min 


 


 


Estimated GFR (


American) 


  


  


  30.0 


 


 


Estimated GFR (Non-


American 


  


  


  25.8 


 


 


BUN/Creatinine Ratio    12.2 


 


Random Glucose    141 mg/dl 


 


Calcium Level    8.5 mg/dl 


 


Test


  10/20/17


07:27 10/20/17


11:20 


  


 


 


Bedside Glucose 143 mg/dl  146 mg/dl   











Assessment & Plan


This is a 72 year old male with a PMH of CAD s/p CABGx2, Hx. of Cardiac Arrest 

and V-Fib s/p AICD placement, insulin dependent DM2 with complications 

including peripheral vascular disease s/p R foot transmetatarsal amputation, 

CKD stage IV, SABRA on nocturnal O2, Hypothyroidism, Mood Disorder with 

depression - presents with shortness of breath





ACUTE ON LIKELY CHRONIC DIASTOLIC CHF, Resolved


- echo noted


 clinically improved with Lasix one dose


  Isordil TID added


  will need Lasix PRN 2-3x a week, PRN for CHF symptoms, but need to closely 

watch crea


  (+) rales today, lasix 20mg po given





- possible component of Acute Bronchitis?


 on Doxycycline Day 4/7


- appreciate Cardio SVC input





TREMORS/SHAKES


- multifactorial





- r/o seizure


  EEG Negative





- r/o Cerebellar Atrophy


  cannot perform MRI as patient has pacemaker


  CT head w/o contrast(+) old infarcts





- from multiple psych meds?


  Psyh consulted, medication doses decreased





- from SABRA/ non adherence of CPAP?


  encouraged CPAP use and patient agreed





- from elevated Ammonia?


  check Liver US: fatty liver


  GI consulted, likely noncontributory


  d/c lactulose





-appreciate Neuro recommendations


 anticipate d/c to Rehab





DM 2


patient is on a high dose of insulin, takes 52 units twice daily of Lantus as 

well as a sliding scale of Novolog


a1c 7.0


- (+) hypoglycemia the other day


- Lantus decreased


 appreciate Pharm SVC recommendations





HTN


elevated


Isordil added, increased to 10mg BID


continue monitoring





CKD stage IV


patient states that he was to have further kidney w/up


- crea trending up, baseline 2.4


 today 2.4


- will need to monitor crea


- Nephrology consulted





Hx. of CAD s/p CABGx2


Hx. of cardiac arrest and V-Fib s/p AICD


cardiac markers negative


echo noted


on Plavix, Aspirin





SABRA


did not tolerate CPAP


uses 2L O2 nocturnally


check nocturnal pulse ox as SOB is worse at night


-- needs at least O2 supplement at HS


  discussed in detail with patient today re: importance of CPAP use and 

consequences of not using it


  he agrees to try CPAP again 


   CPAP as inpatient while sleeping





Hypothyroidism


continue current dose of Synthroid





Mood Disorder and Depression


Psych consulted for medication review





DVT ppx


subq heparin





FULL CODE





Disposition


PT recommending Rehab


anticipate d/c to Rehab when cleared by Neurology


Current Inpatient Medications:





Current Inpatient Medications








 Medications


  (Trade)  Dose


 Ordered  Sig/Richard


 Route  Start Time


 Stop Time Status Last Admin


Dose Admin


 


 Heparin Sodium


  (Porcine)


  (Heparin Sq 5000


 Unit/0.5ml)  5,000 unit  Q8


 SQ  10/15/17 14:00


 11/14/17 13:59  10/20/17 05:46


5,000 UNIT


 


 Insulin Aspart


  (novoLOG ASPART)  **SLIDING


 SCALE**


 **If C...  ACHS


 SC  10/15/17 11:00


 11/14/17 10:59  10/20/17 07:56


7 UNITS


 


 Glucose


  (Glucose 40% Gel)  15-30


 GRAMS 15


 GRAMS...  UD  PRN


 PO  10/15/17 10:45


 11/14/17 10:44   


 


 


 Glucose


  (Glucose Chew


 Tab)  4-8


 Tablets 4


 Tabl...  UD  PRN


 PO  10/15/17 10:45


 11/14/17 10:44   


 


 


 Dextrose


  (Dextrose 50%


 50ML Syringe)  25-50ML OF


 50% DW IV


 FOR...  UD  PRN


 IV  10/15/17 10:45


 11/14/17 10:44   


 


 


 Glucagon


  (Glucagon Inj)  1 mg  UD  PRN


 SQ  10/15/17 10:45


 11/14/17 10:44   


 


 


 Amlodipine


 Besylate


  (Norvasc Tab)  10 mg  DAILY


 PO  10/16/17 09:00


 11/15/17 08:59  10/20/17 07:48


10 MG


 


 Clopidogrel


 Bisulfate


  (plAVix TAB)  75 mg  DAILY


 PO  10/16/17 09:00


 11/15/17 08:59  10/20/17 07:49


75 MG


 


 Duloxetine HCl


  (Cymbalta Cap)  30 mg  HS


 PO  10/15/17 21:00


 11/14/17 20:59  10/19/17 21:08


30 MG


 


 Duloxetine HCl


  (Cymbalta Cap)  60 mg  DAILY


 PO  10/16/17 09:00


 11/15/17 08:59  10/20/17 07:47


60 MG


 


 Imipramine HCl


  (Tofranil Tab)  50 mg  HS


 PO  10/15/17 21:00


 11/14/17 20:59  10/19/17 21:16


50 MG


 


 Imipramine HCl


  (Tofranil Tab)  25 mg  QAM


 PO  10/16/17 09:00


 11/15/17 08:59  10/20/17 07:48


25 MG


 


 Levothyroxine


 Sodium


  (Synthroid Tab)  50 mcg  DAILYBB


 PO  10/16/17 06:30


 11/15/17 06:59  10/20/17 05:48


50 MCG


 


 Magnesium Oxide


  (Mag-Ox Tab)  400 mg  BID


 PO  10/15/17 21:00


 11/14/17 20:59  10/20/17 07:47


400 MG


 


 Metoprolol


 Succinate


  (Toprol Xl Tab)  37.5 mg  HS


 PO  10/15/17 21:00


 11/14/17 20:59  10/19/17 21:16


37.5 MG


 


 Multivitamins


  (Multivitamin


 Tab)  1 tab  DAILY


 PO  10/16/17 09:00


 11/15/17 08:59  10/20/17 07:46


1 TAB


 


 Pantoprazole


 Sodium


  (Protonix Tab)  40 mg  DAILY


 PO  10/16/17 09:00


 11/15/17 08:59  10/20/17 07:47


40 MG


 


 Pregabalin


  (Lyrica Cap)  150 mg  BID


 PO  10/15/17 21:00


 11/14/17 20:59  10/20/17 07:52


150 MG


 


 Rosuvastatin


 Calcium


  (Crestor Tab)  40 mg  DAILY


 PO  10/16/17 09:00


 11/15/17 08:59  10/20/17 07:45


40 MG


 


 Bupropion HCl


  (Wellbutrin-Sr


 Tab)  150 mg  BID


 PO  10/15/17 21:00


 11/14/17 20:59  10/20/17 07:45


150 MG


 


 Sodium Chloride


  (Ocean Nasal


 Spray)  1 sprays  PRN  PRN


 NA  10/15/17 16:45


 11/14/17 16:44   


 


 


 Promethazine HCl


 12.5 mg/Sodium


 Chloride  50.5 ml @ 


 204 mls/hr  Q6H  PRN


 IV  10/15/17 22:15


 11/14/17 22:14  10/15/17 22:29


204 MLS/HR


 


 Albuterol/


 Ipratropium


  (Combivent


 Respimat Inh)  1 puffs  QID


 INH  10/17/17 13:00


 11/16/17 12:59  10/20/17 07:44


1 PUFFS


 


 Miscellaneous


 Information


  (Consult


 Glycemic


 Management


 Pharmacy)  1 ea  UD  PRN


 N/A  10/17/17 09:45


 11/16/17 09:44   


 


 


 Aspirin


  (Ecotrin Tab)  81 mg  DAILY


 PO  10/18/17 09:00


 11/17/17 08:59  10/20/17 07:45


81 MG


 


 Fenofibrate


  (Tricor)  200 mg  QAM


 PO  10/19/17 09:00


 11/18/17 08:59  10/20/17 07:48


200 MG


 


 Insulin Glargine


  (Lantus Solostar


 Pen)  SEE


 PROTOCOL


 TEXT PLEASE  BID


 SC  10/19/17 21:00


 11/18/17 20:59  10/20/17 07:57


18 UNITS


 


 Doxycycline


 Hyclate


  (Vibramycin Cap)  100 mg  BID


 PO  10/19/17 10:00


 10/24/17 23:59  10/20/17 07:49


100 MG


 


 Isosorbide


 Dinitrate


  (Isordil Tab)  10 mg  BID


 PO  10/20/17 21:00


 11/16/17 11:59 UNV  


 


 


 Isosorbide


 Dinitrate


  (Isordil Tab)  20 mg  NOW  ONCE


 PO  10/20/17 11:45


 10/20/17 11:46 UNV

## 2017-10-21 VITALS
DIASTOLIC BLOOD PRESSURE: 72 MMHG | OXYGEN SATURATION: 95 % | HEART RATE: 62 BPM | TEMPERATURE: 98.06 F | SYSTOLIC BLOOD PRESSURE: 129 MMHG

## 2017-10-21 VITALS
SYSTOLIC BLOOD PRESSURE: 131 MMHG | DIASTOLIC BLOOD PRESSURE: 73 MMHG | OXYGEN SATURATION: 96 % | HEART RATE: 60 BPM | TEMPERATURE: 97.7 F

## 2017-10-21 VITALS
OXYGEN SATURATION: 95 % | HEART RATE: 71 BPM | SYSTOLIC BLOOD PRESSURE: 146 MMHG | DIASTOLIC BLOOD PRESSURE: 80 MMHG | TEMPERATURE: 98.06 F

## 2017-10-21 VITALS
TEMPERATURE: 97.34 F | SYSTOLIC BLOOD PRESSURE: 124 MMHG | DIASTOLIC BLOOD PRESSURE: 69 MMHG | HEART RATE: 71 BPM | OXYGEN SATURATION: 94 %

## 2017-10-21 VITALS
OXYGEN SATURATION: 95 % | SYSTOLIC BLOOD PRESSURE: 129 MMHG | DIASTOLIC BLOOD PRESSURE: 67 MMHG | TEMPERATURE: 98.06 F | HEART RATE: 63 BPM

## 2017-10-21 VITALS
HEART RATE: 68 BPM | OXYGEN SATURATION: 96 % | SYSTOLIC BLOOD PRESSURE: 185 MMHG | TEMPERATURE: 98.24 F | DIASTOLIC BLOOD PRESSURE: 97 MMHG

## 2017-10-21 VITALS — OXYGEN SATURATION: 93 % | HEART RATE: 74 BPM

## 2017-10-21 LAB
ANION GAP SERPL CALC-SCNC: 6 MMOL/L (ref 3–11)
BUN SERPL-MCNC: 33 MG/DL (ref 7–18)
BUN/CREAT SERPL: 11.8 (ref 10–20)
CALCIUM SERPL-MCNC: 8.5 MG/DL (ref 8.5–10.1)
CHLORIDE SERPL-SCNC: 104 MMOL/L (ref 98–107)
CO2 SERPL-SCNC: 29 MMOL/L (ref 21–32)
CREAT CL PREDICTED SERPL C-G-VRATE: 30.5 ML/MIN
CREAT SERPL-MCNC: 2.81 MG/DL (ref 0.6–1.4)
GLUCOSE SERPL-MCNC: 123 MG/DL (ref 70–99)
POTASSIUM SERPL-SCNC: 3.9 MMOL/L (ref 3.5–5.1)
SODIUM SERPL-SCNC: 140 MMOL/L (ref 136–145)

## 2017-10-21 RX ADMIN — PREGABALIN SCH MG: 150 CAPSULE ORAL at 07:53

## 2017-10-21 RX ADMIN — INSULIN GLARGINE SCH UNITS: 100 INJECTION, SOLUTION SUBCUTANEOUS at 20:52

## 2017-10-21 RX ADMIN — IPRATROPIUM BROMIDE AND ALBUTEROL SCH PUFFS: 20; 100 SPRAY, METERED RESPIRATORY (INHALATION) at 17:16

## 2017-10-21 RX ADMIN — BUPROPION HYDROCHLORIDE SCH MG: 150 TABLET, EXTENDED RELEASE ORAL at 17:16

## 2017-10-21 RX ADMIN — HEPARIN SODIUM SCH UNIT: 10000 INJECTION, SOLUTION INTRAVENOUS; SUBCUTANEOUS at 20:53

## 2017-10-21 RX ADMIN — HEPARIN SODIUM SCH UNIT: 10000 INJECTION, SOLUTION INTRAVENOUS; SUBCUTANEOUS at 14:54

## 2017-10-21 RX ADMIN — Medication SCH TAB: at 07:56

## 2017-10-21 RX ADMIN — INSULIN ASPART SCH UNITS: 100 INJECTION, SOLUTION INTRAVENOUS; SUBCUTANEOUS at 17:18

## 2017-10-21 RX ADMIN — ISOSORBIDE DINITRATE SCH MG: 5 TABLET ORAL at 20:50

## 2017-10-21 RX ADMIN — INSULIN ASPART SCH UNITS: 100 INJECTION, SOLUTION INTRAVENOUS; SUBCUTANEOUS at 08:28

## 2017-10-21 RX ADMIN — ROSUVASTATIN CALCIUM SCH MG: 20 TABLET, FILM COATED ORAL at 07:54

## 2017-10-21 RX ADMIN — Medication SCH MG: at 07:55

## 2017-10-21 RX ADMIN — HEPARIN SODIUM SCH UNIT: 10000 INJECTION, SOLUTION INTRAVENOUS; SUBCUTANEOUS at 05:56

## 2017-10-21 RX ADMIN — PREGABALIN SCH MG: 100 CAPSULE ORAL at 20:50

## 2017-10-21 RX ADMIN — DULOXETINE SCH MG: 30 CAPSULE, DELAYED RELEASE PELLETS ORAL at 20:49

## 2017-10-21 RX ADMIN — BUPROPION HYDROCHLORIDE SCH MG: 150 TABLET, EXTENDED RELEASE ORAL at 20:50

## 2017-10-21 RX ADMIN — CLOPIDOGREL BISULFATE SCH MG: 75 TABLET, FILM COATED ORAL at 07:53

## 2017-10-21 RX ADMIN — DULOXETINE SCH MG: 60 CAPSULE, DELAYED RELEASE PELLETS ORAL at 07:55

## 2017-10-21 RX ADMIN — PANTOPRAZOLE SCH MG: 40 TABLET, DELAYED RELEASE ORAL at 07:55

## 2017-10-21 RX ADMIN — LEVOTHYROXINE SODIUM SCH MCG: 50 TABLET ORAL at 05:50

## 2017-10-21 RX ADMIN — AMLODIPINE BESYLATE SCH MG: 5 TABLET ORAL at 07:55

## 2017-10-21 RX ADMIN — DOXYCYCLINE HYCLATE SCH MG: 100 CAPSULE, GELATIN COATED ORAL at 20:49

## 2017-10-21 RX ADMIN — INSULIN ASPART SCH UNITS: 100 INJECTION, SOLUTION INTRAVENOUS; SUBCUTANEOUS at 12:21

## 2017-10-21 RX ADMIN — METOPROLOL SUCCINATE SCH MG: 25 TABLET, EXTENDED RELEASE ORAL at 20:50

## 2017-10-21 RX ADMIN — ISOSORBIDE DINITRATE SCH MG: 5 TABLET ORAL at 07:54

## 2017-10-21 RX ADMIN — INSULIN ASPART SCH UNITS: 100 INJECTION, SOLUTION INTRAVENOUS; SUBCUTANEOUS at 20:26

## 2017-10-21 RX ADMIN — IPRATROPIUM BROMIDE AND ALBUTEROL SCH PUFFS: 20; 100 SPRAY, METERED RESPIRATORY (INHALATION) at 07:52

## 2017-10-21 RX ADMIN — IPRATROPIUM BROMIDE AND ALBUTEROL SCH PUFFS: 20; 100 SPRAY, METERED RESPIRATORY (INHALATION) at 20:50

## 2017-10-21 RX ADMIN — IMIPRAMINE HYDROCHLORIDE SCH MG: 25 TABLET, FILM COATED ORAL at 20:50

## 2017-10-21 RX ADMIN — BUPROPION HYDROCHLORIDE SCH MG: 150 TABLET, EXTENDED RELEASE ORAL at 07:53

## 2017-10-21 RX ADMIN — DOXYCYCLINE HYCLATE SCH MG: 100 CAPSULE, GELATIN COATED ORAL at 07:53

## 2017-10-21 RX ADMIN — IPRATROPIUM BROMIDE AND ALBUTEROL SCH PUFFS: 20; 100 SPRAY, METERED RESPIRATORY (INHALATION) at 12:21

## 2017-10-21 RX ADMIN — INSULIN GLARGINE SCH UNITS: 100 INJECTION, SOLUTION SUBCUTANEOUS at 08:28

## 2017-10-21 RX ADMIN — Medication SCH MG: at 20:48

## 2017-10-21 RX ADMIN — FENOFIBRATE SCH MG: 200 CAPSULE ORAL at 07:56

## 2017-10-21 RX ADMIN — Medication SCH MG: at 07:53

## 2017-10-21 NOTE — PROGRESS NOTE
DATE: 10/21/2017

 

SUBJECTIVE:  Christiano looks a little better today.  His tricyclic antidepressant 
has been reduced

a bit and I am going to drop his Lyrica down to 100 mg twice a day from the

150 twice a day.  He claims to be pain free and the tremulousness and the

myoclonic like activity certainly is less and he was able to get out of bed

without a whole lot of shaking today and is working a crossword type puzzle

today without much tremulousness of his hands, although there is a slight one

of the outstretched hands without much in the way of a cerebellar dysmetria

and without any asterixis.

 

I suspect this was a drug-induced issue and perhaps an illness-induced issue

with stress induced emergence of an underlying essential tremor pattern.  We 
are certainly

going to drop back on some of the Lyrica as this was a plan made for outpatient 
management

anyway, according Ju LEDEZMA, with whom I discussed the case 
yesterday

and the patient is known to both she and Dr. Ju Castellanos who has seen

this man for several years now for has diabetic nerve pain.

 

I will check back tomorrow and see if the tremor is even better on the

reduced Lyrica, but it may take several days for this to be demonstrable and

I really do not want to start an agent such as Mysoline in the setting if we

can minimize the tremor by reducing some of the agents he is already on and

still maintain pain relief.

 

I will check with him tomorrow.

 

 

 

 

[facility]

## 2017-10-21 NOTE — PROGRESS NOTE
Medicine Progress Note


Date & Time of Visit:


Oct 21, 2017 at 12:26.


Subjective


patient seen sitting in chair


alert, oriented, comfortable


states shaking continues to improve


denies chest pain, dyspnea


no other symptoms





Objective





Last 8 Hrs








  Date Time  Temp Pulse Resp B/P (MAP) Pulse Ox O2 Delivery O2 Flow Rate FiO2


 


10/21/17 11:04 36.5 60 20 131/73 (92) 96 Nasal Cannula 2.0 


 


10/21/17 08:00      Nasal Cannula 2.0 





      BiPAP  


 


10/21/17 07:08 36.8 68 20 185/97 (126) 96 Nasal Cannula 2.0 








Physical Exam:


General- oriented x 3, not in distress, speaks in sentences with no effort





Neck- no JVD





Lungs- clear breath sounds bilaterally, no rales/wheezes





Heart- regular rhythm; no murmur, normal rate





Abdomen- normal bowel sounds, soft, nontender





Extremities- no pretibial edema, no calf tenderness; peripheral pulses intact





Neuro- alert, oriented x 3;no gross focal deficits





Skin- warm & dry


Laboratory Results:





Last 24 Hours








Test


  10/20/17


16:12 10/20/17


19:59 10/21/17


07:11 10/21/17


07:14


 


Bedside Glucose 105 mg/dl  139 mg/dl   117 mg/dl 


 


Sodium Level   140 mmol/L  


 


Potassium Level   3.9 mmol/L  


 


Chloride Level   104 mmol/L  


 


Carbon Dioxide Level   29 mmol/L  


 


Anion Gap   6.0 mmol/L  


 


Blood Urea Nitrogen   33 mg/dl  


 


Creatinine   2.81 mg/dl  


 


Est Creatinine Clear Calc


Drug Dose 


  


  30.5 ml/min 


  


 


 


Estimated GFR (


American) 


  


  24.9 


  


 


 


Estimated GFR (Non-


American 


  


  21.5 


  


 


 


BUN/Creatinine Ratio   11.8  


 


Random Glucose   123 mg/dl  


 


Calcium Level   8.5 mg/dl  


 


Test


  10/21/17


11:24 


  


  


 


 


Bedside Glucose 126 mg/dl    











Assessment & Plan


This is a 72 year old male with a PMH of CAD s/p CABGx2, Hx. of Cardiac Arrest 

and V-Fib s/p AICD placement, insulin dependent DM2 with complications 

including peripheral vascular disease s/p R foot transmetatarsal amputation, 

CKD stage IV, SABRA on nocturnal O2, Hypothyroidism, Mood Disorder with 

depression - presents with shortness of breath





ACUTE ON LIKELY CHRONIC DIASTOLIC CHF, Resolved


- echo noted


 clinically improved with Lasix one dose


  Isordil TID added


  will need Lasix PRN 2-3x a week, PRN for CHF symptoms, but need to closely 

watch crea


  10/20/17: given lasix 20mg po given





- possible component of Acute Bronchitis?


 on Doxycycline Day 5/7


- appreciate Cardio SVC input





TREMORS/SHAKES


- multifactorial





- from multiple psych meds?


  Psych consulted, medication doses decreased





- from SABRA/ non adherence of CPAP?


  encouraged CPAP use and patient agreed





- r/o seizure


  EEG Negative





- r/o Cerebellar Atrophy


  cannot perform MRI as patient has pacemaker


  CT head w/o contrast(+) old infarcts





- from elevated Ammonia?


  Liver US: fatty liver


  GI consulted, likely noncontributory


  d/c lactulose





- improving 


-appreciate Neuro recommendations


 anticipate d/c to Rehab





DM 2


patient is on a high dose of insulin, takes 52 units twice daily of Lantus as 

well as a sliding scale of Novolog


a1c 7.0


- (+) hypoglycemia the other day


- Lantus decreased


 appreciate Pharm SVC recommendations





HTN


elevated


Isordil added, increased to 10mg BID


- BP improving





CKD stage IV


patient states that he was to have further kidney w/up


- crea trending up, baseline 2.4


  today 2.8


- will need to monitor crea


- Nephrology consulted





Hx. of CAD s/p CABGx2


Hx. of cardiac arrest and V-Fib s/p AICD


cardiac markers negative


echo noted


on Plavix, Aspirin





SABRA


did not tolerate CPAP


uses 2L O2 nocturnally


check nocturnal pulse ox as SOB is worse at night


-- needs at least O2 supplement at HS


  discussed in detail with patient today re: importance of CPAP use and 

consequences of not using it


  he agreed to try CPAP again 


   CPAP as inpatient while sleeping





Hypothyroidism


continue current dose of Synthroid





Mood Disorder and Depression


Psych consulted for medication review





DVT ppx


subq heparin





FULL CODE





Disposition


PT recommending Rehab


anticipate d/c to Rehab when cleared by Neurology


Current Inpatient Medications:





Current Inpatient Medications








 Medications


  (Trade)  Dose


 Ordered  Sig/Richard


 Route  Start Time


 Stop Time Status Last Admin


Dose Admin


 


 Heparin Sodium


  (Porcine)


  (Heparin Sq 5000


 Unit/0.5ml)  5,000 unit  Q8


 SQ  10/15/17 14:00


 11/14/17 13:59  10/21/17 05:56


5,000 UNIT


 


 Insulin Aspart


  (novoLOG ASPART)  **SLIDING


 SCALE**


 **If C...  ACHS


 SC  10/15/17 11:00


 11/14/17 10:59  10/21/17 12:21


2 UNITS


 


 Glucose


  (Glucose 40% Gel)  15-30


 GRAMS 15


 GRAMS...  UD  PRN


 PO  10/15/17 10:45


 11/14/17 10:44   


 


 


 Glucose


  (Glucose Chew


 Tab)  4-8


 Tablets 4


 Tabl...  UD  PRN


 PO  10/15/17 10:45


 11/14/17 10:44   


 


 


 Dextrose


  (Dextrose 50%


 50ML Syringe)  25-50ML OF


 50% DW IV


 FOR...  UD  PRN


 IV  10/15/17 10:45


 11/14/17 10:44   


 


 


 Glucagon


  (Glucagon Inj)  1 mg  UD  PRN


 SQ  10/15/17 10:45


 11/14/17 10:44   


 


 


 Amlodipine


 Besylate


  (Norvasc Tab)  10 mg  DAILY


 PO  10/16/17 09:00


 11/15/17 08:59  10/21/17 07:55


10 MG


 


 Clopidogrel


 Bisulfate


  (plAVix TAB)  75 mg  DAILY


 PO  10/16/17 09:00


 11/15/17 08:59  10/21/17 07:53


75 MG


 


 Duloxetine HCl


  (Cymbalta Cap)  30 mg  HS


 PO  10/15/17 21:00


 11/14/17 20:59  10/20/17 20:30


30 MG


 


 Duloxetine HCl


  (Cymbalta Cap)  60 mg  DAILY


 PO  10/16/17 09:00


 11/15/17 08:59  10/21/17 07:55


60 MG


 


 Levothyroxine


 Sodium


  (Synthroid Tab)  50 mcg  DAILYBB


 PO  10/16/17 06:30


 11/15/17 06:59  10/21/17 05:50


50 MCG


 


 Magnesium Oxide


  (Mag-Ox Tab)  400 mg  BID


 PO  10/15/17 21:00


 11/14/17 20:59  10/21/17 07:55


400 MG


 


 Metoprolol


 Succinate


  (Toprol Xl Tab)  37.5 mg  HS


 PO  10/15/17 21:00


 11/14/17 20:59  10/20/17 20:31


37.5 MG


 


 Multivitamins


  (Multivitamin


 Tab)  1 tab  DAILY


 PO  10/16/17 09:00


 11/15/17 08:59  10/21/17 07:56


1 TAB


 


 Pantoprazole


 Sodium


  (Protonix Tab)  40 mg  DAILY


 PO  10/16/17 09:00


 11/15/17 08:59  10/21/17 07:55


40 MG


 


 Pregabalin


  (Lyrica Cap)  150 mg  BID


 PO  10/15/17 21:00


 11/14/17 20:59  10/21/17 07:53


150 MG


 


 Rosuvastatin


 Calcium


  (Crestor Tab)  40 mg  DAILY


 PO  10/16/17 09:00


 11/15/17 08:59  10/21/17 07:54


40 MG


 


 Sodium Chloride


  (Ocean Nasal


 Spray)  1 sprays  PRN  PRN


 NA  10/15/17 16:45


 11/14/17 16:44   


 


 


 Promethazine HCl


 12.5 mg/Sodium


 Chloride  50.5 ml @ 


 204 mls/hr  Q6H  PRN


 IV  10/15/17 22:15


 11/14/17 22:14  10/15/17 22:29


204 MLS/HR


 


 Albuterol/


 Ipratropium


  (Combivent


 Respimat Inh)  1 puffs  QID


 INH  10/17/17 13:00


 11/16/17 12:59  10/21/17 12:21


1 PUFFS


 


 Miscellaneous


 Information


  (Consult


 Glycemic


 Management


 Pharmacy)  1 ea  UD  PRN


 N/A  10/17/17 09:45


 11/16/17 09:44   


 


 


 Aspirin


  (Ecotrin Tab)  81 mg  DAILY


 PO  10/18/17 09:00


 11/17/17 08:59  10/21/17 07:53


81 MG


 


 Fenofibrate


  (Tricor)  200 mg  QAM


 PO  10/19/17 09:00


 11/18/17 08:59  10/21/17 07:56


200 MG


 


 Doxycycline


 Hyclate


  (Vibramycin Cap)  100 mg  BID


 PO  10/19/17 10:00


 10/24/17 23:59  10/21/17 07:53


100 MG


 


 Insulin Glargine


  (Lantus Solostar


 Pen)  18 units  BID


 SC  10/20/17 21:00


 11/19/17 20:59  10/21/17 08:28


18 UNITS


 


 Isosorbide


 Dinitrate


  (Isordil Tab)  10 mg  BID


 PO  10/21/17 09:00


 11/19/17 15:59  10/21/17 07:54


10 MG


 


 Bupropion HCl


  (Wellbutrin-Sr


 Tab)  150 mg  BID17


 PO  10/21/17 17:00


 11/14/17 20:59 UNV  


 


 


 Imipramine HCl


  (Tofranil Tab)  25 mg  HS


 PO  10/21/17 21:00


 11/14/17 20:59 UNV

## 2017-10-21 NOTE — PSYCHIATRIC PROGRESS NOTES
Psychiatric Progress Note


Date of Service


Oct 21, 2017.





Notes


patient remains hospitalized, interim progress reviewed.  Will continue 

Tofranil taper to 50 mg total daily dose today then 25 mg po qhs for 1-2 more 

days.  Given hx of sleep issues will shift Wellbutrin BID dosing to earlier in 

pm.  If patient is discharged, follow Tofranil taper above and continue 

Wellbutrin and Cymbalta current doses.  Follow-up with current prescribers.

## 2017-10-22 VITALS
DIASTOLIC BLOOD PRESSURE: 64 MMHG | TEMPERATURE: 98.78 F | HEART RATE: 70 BPM | OXYGEN SATURATION: 95 % | SYSTOLIC BLOOD PRESSURE: 144 MMHG

## 2017-10-22 VITALS — SYSTOLIC BLOOD PRESSURE: 147 MMHG | DIASTOLIC BLOOD PRESSURE: 76 MMHG | HEART RATE: 70 BPM

## 2017-10-22 VITALS
TEMPERATURE: 98.06 F | DIASTOLIC BLOOD PRESSURE: 57 MMHG | OXYGEN SATURATION: 98 % | SYSTOLIC BLOOD PRESSURE: 128 MMHG | HEART RATE: 71 BPM

## 2017-10-22 VITALS
OXYGEN SATURATION: 96 % | TEMPERATURE: 98.06 F | SYSTOLIC BLOOD PRESSURE: 153 MMHG | DIASTOLIC BLOOD PRESSURE: 74 MMHG | HEART RATE: 67 BPM

## 2017-10-22 VITALS
TEMPERATURE: 97.7 F | SYSTOLIC BLOOD PRESSURE: 123 MMHG | OXYGEN SATURATION: 96 % | DIASTOLIC BLOOD PRESSURE: 62 MMHG | HEART RATE: 82 BPM

## 2017-10-22 VITALS — OXYGEN SATURATION: 95 % | HEART RATE: 74 BPM

## 2017-10-22 VITALS
SYSTOLIC BLOOD PRESSURE: 135 MMHG | HEART RATE: 74 BPM | OXYGEN SATURATION: 93 % | DIASTOLIC BLOOD PRESSURE: 74 MMHG | TEMPERATURE: 97.34 F

## 2017-10-22 LAB
ANION GAP SERPL CALC-SCNC: 7 MMOL/L (ref 3–11)
BUN SERPL-MCNC: 37 MG/DL (ref 7–18)
BUN/CREAT SERPL: 14 (ref 10–20)
CALCIUM SERPL-MCNC: 8.7 MG/DL (ref 8.5–10.1)
CHLORIDE SERPL-SCNC: 105 MMOL/L (ref 98–107)
CO2 SERPL-SCNC: 30 MMOL/L (ref 21–32)
CREAT CL PREDICTED SERPL C-G-VRATE: 32.2 ML/MIN
CREAT SERPL-MCNC: 2.65 MG/DL (ref 0.6–1.4)
GLUCOSE SERPL-MCNC: 114 MG/DL (ref 70–99)
POTASSIUM SERPL-SCNC: 4 MMOL/L (ref 3.5–5.1)
SODIUM SERPL-SCNC: 141 MMOL/L (ref 136–145)

## 2017-10-22 RX ADMIN — INSULIN ASPART SCH UNITS: 100 INJECTION, SOLUTION INTRAVENOUS; SUBCUTANEOUS at 12:22

## 2017-10-22 RX ADMIN — FENOFIBRATE SCH MG: 200 CAPSULE ORAL at 07:59

## 2017-10-22 RX ADMIN — ISOSORBIDE DINITRATE SCH MG: 5 TABLET ORAL at 21:08

## 2017-10-22 RX ADMIN — IMIPRAMINE HYDROCHLORIDE SCH MG: 25 TABLET, FILM COATED ORAL at 21:09

## 2017-10-22 RX ADMIN — Medication SCH MG: at 07:57

## 2017-10-22 RX ADMIN — DOXYCYCLINE HYCLATE SCH MG: 100 CAPSULE, GELATIN COATED ORAL at 21:06

## 2017-10-22 RX ADMIN — INSULIN ASPART SCH UNITS: 100 INJECTION, SOLUTION INTRAVENOUS; SUBCUTANEOUS at 21:00

## 2017-10-22 RX ADMIN — IPRATROPIUM BROMIDE AND ALBUTEROL SCH PUFFS: 20; 100 SPRAY, METERED RESPIRATORY (INHALATION) at 17:25

## 2017-10-22 RX ADMIN — PREGABALIN SCH MG: 100 CAPSULE ORAL at 07:56

## 2017-10-22 RX ADMIN — INSULIN ASPART SCH UNITS: 100 INJECTION, SOLUTION INTRAVENOUS; SUBCUTANEOUS at 17:25

## 2017-10-22 RX ADMIN — IPRATROPIUM BROMIDE AND ALBUTEROL SCH PUFFS: 20; 100 SPRAY, METERED RESPIRATORY (INHALATION) at 07:59

## 2017-10-22 RX ADMIN — DULOXETINE SCH MG: 30 CAPSULE, DELAYED RELEASE PELLETS ORAL at 21:06

## 2017-10-22 RX ADMIN — AMLODIPINE BESYLATE SCH MG: 5 TABLET ORAL at 07:56

## 2017-10-22 RX ADMIN — LEVOTHYROXINE SODIUM SCH MCG: 50 TABLET ORAL at 05:42

## 2017-10-22 RX ADMIN — HEPARIN SODIUM SCH UNIT: 10000 INJECTION, SOLUTION INTRAVENOUS; SUBCUTANEOUS at 21:57

## 2017-10-22 RX ADMIN — Medication SCH MG: at 21:08

## 2017-10-22 RX ADMIN — PANTOPRAZOLE SCH MG: 40 TABLET, DELAYED RELEASE ORAL at 07:57

## 2017-10-22 RX ADMIN — CLOPIDOGREL BISULFATE SCH MG: 75 TABLET, FILM COATED ORAL at 07:58

## 2017-10-22 RX ADMIN — PREGABALIN SCH MG: 100 CAPSULE ORAL at 21:18

## 2017-10-22 RX ADMIN — ISOSORBIDE DINITRATE SCH MG: 5 TABLET ORAL at 07:59

## 2017-10-22 RX ADMIN — DOXYCYCLINE HYCLATE SCH MG: 100 CAPSULE, GELATIN COATED ORAL at 07:58

## 2017-10-22 RX ADMIN — INSULIN GLARGINE SCH UNITS: 100 INJECTION, SOLUTION SUBCUTANEOUS at 21:18

## 2017-10-22 RX ADMIN — HEPARIN SODIUM SCH UNIT: 10000 INJECTION, SOLUTION INTRAVENOUS; SUBCUTANEOUS at 14:45

## 2017-10-22 RX ADMIN — INSULIN ASPART SCH UNITS: 100 INJECTION, SOLUTION INTRAVENOUS; SUBCUTANEOUS at 08:15

## 2017-10-22 RX ADMIN — Medication SCH TAB: at 07:57

## 2017-10-22 RX ADMIN — ROSUVASTATIN CALCIUM SCH MG: 20 TABLET, FILM COATED ORAL at 07:57

## 2017-10-22 RX ADMIN — INSULIN GLARGINE SCH UNITS: 100 INJECTION, SOLUTION SUBCUTANEOUS at 08:15

## 2017-10-22 RX ADMIN — Medication SCH MG: at 07:58

## 2017-10-22 RX ADMIN — DULOXETINE SCH MG: 60 CAPSULE, DELAYED RELEASE PELLETS ORAL at 07:57

## 2017-10-22 RX ADMIN — HEPARIN SODIUM SCH UNIT: 10000 INJECTION, SOLUTION INTRAVENOUS; SUBCUTANEOUS at 05:45

## 2017-10-22 RX ADMIN — METOPROLOL SUCCINATE SCH MG: 25 TABLET, EXTENDED RELEASE ORAL at 21:07

## 2017-10-22 RX ADMIN — IPRATROPIUM BROMIDE AND ALBUTEROL SCH PUFFS: 20; 100 SPRAY, METERED RESPIRATORY (INHALATION) at 21:09

## 2017-10-22 RX ADMIN — IPRATROPIUM BROMIDE AND ALBUTEROL SCH PUFFS: 20; 100 SPRAY, METERED RESPIRATORY (INHALATION) at 12:22

## 2017-10-22 RX ADMIN — BUPROPION HYDROCHLORIDE SCH MG: 150 TABLET, EXTENDED RELEASE ORAL at 17:25

## 2017-10-22 NOTE — PROGRESS NOTE
Medicine Progress Note


Date & Time of Visit:


Oct 22, 2017 at 12:11.


Subjective


resting in bedside chair, comfortable


states he feels better again today


denies dyspnea, chest pain, dizziness


shakiness of the legs continue to improve


tolerating Bipap at night





no other symptoms





Objective





Last 8 Hrs








  Date Time  Temp Pulse Resp B/P (MAP) Pulse Ox O2 Delivery O2 Flow Rate FiO2


 


10/22/17 11:13 36.5 82 20 123/62 (82) 96 Nasal Cannula 2.0 


 


10/22/17 08:00      Nasal Cannula 2.0 





      BiPAP  


 


10/22/17 07:14 36.7 67 22 153/74 (100) 96 Nasal Cannula 2.0 


 


10/22/17 04:20 36.7 71 20 128/57 (80) 98 CPAP 2.0 








Physical Exam:


General- oriented x 3, not in distress, speaks in sentences with no effort





Neck- no JVD





Lungs- faint rales bilateral bases





Heart- regular rhythm; no murmur, normal rate





Abdomen- normal bowel sounds, soft, nontender





Extremities- trace lower leg edema, no calf tenderness; peripheral pulses intact





Neuro- alert, oriented x 3;no gross focal deficits





Skin- warm & dry


Laboratory Results:





Last 24 Hours








Test


  10/21/17


16:13 10/21/17


20:05 10/22/17


06:46 10/22/17


07:17


 


Bedside Glucose 99 mg/dl  113 mg/dl   112 mg/dl 


 


Sodium Level   141 mmol/L  


 


Potassium Level   4.0 mmol/L  


 


Chloride Level   105 mmol/L  


 


Carbon Dioxide Level   30 mmol/L  


 


Anion Gap   7.0 mmol/L  


 


Blood Urea Nitrogen   37 mg/dl  


 


Creatinine   2.65 mg/dl  


 


Est Creatinine Clear Calc


Drug Dose 


  


  32.2 ml/min 


  


 


 


Estimated GFR (


American) 


  


  26.7 


  


 


 


Estimated GFR (Non-


American 


  


  23.0 


  


 


 


BUN/Creatinine Ratio   14.0  


 


Random Glucose   114 mg/dl  


 


Calcium Level   8.7 mg/dl  


 


Test


  10/22/17


11:09 


  


  


 


 


Bedside Glucose 164 mg/dl    











Assessment & Plan


This is a 72 year old male with a PMH of CAD s/p CABGx2, Hx. of Cardiac Arrest 

and V-Fib s/p AICD placement, insulin dependent DM2 with complications 

including peripheral vascular disease s/p R foot transmetatarsal amputation, 

CKD stage IV, SABRA on nocturnal O2, Hypothyroidism, Mood Disorder with 

depression - presents with shortness of breath





ACUTE ON LIKELY CHRONIC DIASTOLIC CHF, Resolved


- echo noted


 clinically improved with Lasix one dose


  Isordil TID added


  will need Lasix PRN 2-3x a week, PRN for CHF symptoms, but need to closely 

watch crea


  10/20/17: given lasix 20mg po given


  (+) rales and pretibial edema again today


  will order Lasix 20mg po today





- possible component of Acute Bronchitis?


 on Doxycycline Day 6/7


- appreciate Cardio SVC input





TREMORS/SHAKES


- multifactorial





- from multiple psych meds?


  Psych consulted, medication doses decreased


  Lyrica also tapered down





- from SABRA/ non adherence of CPAP?


  encouraged CPAP use and patient agreed, has been adherent for the past 3 days





- r/o seizure


  EEG Negative





- r/o Cerebellar Atrophy


  cannot perform MRI as patient has pacemaker


  CT head w/o contrast(+) old infarcts





- from elevated Ammonia?


  Liver US: fatty liver


  GI consulted, likely noncontributory


  d/c lactulose





- improving 


-appreciate Neuro recommendations


 anticipate d/c to Rehab





DM 2


patient is on a high dose of insulin, takes 52 units twice daily of Lantus as 

well as a sliding scale of Novolog


a1c 7.0


- (+) hypoglycemia the other day


- Lantus decreased


 appreciate Pharm SVC recommendations





HTN


elevated


Isordil added, increased to 10mg BID


- BP improving, monitor





CKD stage IV


patient states that he was to have further kidney w/up


- crea trending up, baseline 2.4


  today 2.4


- will need to monitor crea


- Nephrology consulted





Hx. of CAD s/p CABGx2


Hx. of cardiac arrest and V-Fib s/p AICD


cardiac markers negative


echo noted


on Plavix, Aspirin





SABRA


did not tolerate CPAP


uses 2L O2 nocturnally


check nocturnal pulse ox as SOB is worse at night


-- needs at least O2 supplement at HS


  discussed in detail with patient today re: importance of CPAP use and 

consequences of not using it


  he agreed to try CPAP again 


   CPAP as inpatient while sleeping





Hypothyroidism


continue current dose of Synthroid





Mood Disorder and Depression


Psych consulted for medication review





DVT ppx


subq heparin





FULL CODE





Disposition


PT recommending Rehab


anticipate d/c to Rehab when cleared by Neurology


Current Inpatient Medications:





Current Inpatient Medications








 Medications


  (Trade)  Dose


 Ordered  Sig/Richard


 Route  Start Time


 Stop Time Status Last Admin


Dose Admin


 


 Heparin Sodium


  (Porcine)


  (Heparin Sq 5000


 Unit/0.5ml)  5,000 unit  Q8


 SQ  10/15/17 14:00


 11/14/17 13:59  10/22/17 05:45


5,000 UNIT


 


 Insulin Aspart


  (novoLOG ASPART)  **SLIDING


 SCALE**


 **If C...  ACHS


 SC  10/15/17 11:00


 11/14/17 10:59  10/22/17 08:15


4 UNITS


 


 Glucose


  (Glucose 40% Gel)  15-30


 GRAMS 15


 GRAMS...  UD  PRN


 PO  10/15/17 10:45


 11/14/17 10:44   


 


 


 Glucose


  (Glucose Chew


 Tab)  4-8


 Tablets 4


 Tabl...  UD  PRN


 PO  10/15/17 10:45


 11/14/17 10:44   


 


 


 Dextrose


  (Dextrose 50%


 50ML Syringe)  25-50ML OF


 50% DW IV


 FOR...  UD  PRN


 IV  10/15/17 10:45


 11/14/17 10:44   


 


 


 Glucagon


  (Glucagon Inj)  1 mg  UD  PRN


 SQ  10/15/17 10:45


 11/14/17 10:44   


 


 


 Amlodipine


 Besylate


  (Norvasc Tab)  10 mg  DAILY


 PO  10/16/17 09:00


 11/15/17 08:59  10/22/17 07:56


10 MG


 


 Clopidogrel


 Bisulfate


  (plAVix TAB)  75 mg  DAILY


 PO  10/16/17 09:00


 11/15/17 08:59  10/22/17 07:58


75 MG


 


 Duloxetine HCl


  (Cymbalta Cap)  30 mg  HS


 PO  10/15/17 21:00


 11/14/17 20:59  10/21/17 20:49


30 MG


 


 Duloxetine HCl


  (Cymbalta Cap)  60 mg  DAILY


 PO  10/16/17 09:00


 11/15/17 08:59  10/22/17 07:57


60 MG


 


 Levothyroxine


 Sodium


  (Synthroid Tab)  50 mcg  DAILYBB


 PO  10/16/17 06:30


 11/15/17 06:59  10/22/17 05:42


50 MCG


 


 Magnesium Oxide


  (Mag-Ox Tab)  400 mg  BID


 PO  10/15/17 21:00


 11/14/17 20:59  10/22/17 07:57


400 MG


 


 Metoprolol


 Succinate


  (Toprol Xl Tab)  37.5 mg  HS


 PO  10/15/17 21:00


 11/14/17 20:59  10/21/17 20:50


37.5 MG


 


 Multivitamins


  (Multivitamin


 Tab)  1 tab  DAILY


 PO  10/16/17 09:00


 11/15/17 08:59  10/22/17 07:57


1 TAB


 


 Pantoprazole


 Sodium


  (Protonix Tab)  40 mg  DAILY


 PO  10/16/17 09:00


 11/15/17 08:59  10/22/17 07:57


40 MG


 


 Rosuvastatin


 Calcium


  (Crestor Tab)  40 mg  DAILY


 PO  10/16/17 09:00


 11/15/17 08:59  10/22/17 07:57


40 MG


 


 Sodium Chloride


  (Ocean Nasal


 Spray)  1 sprays  PRN  PRN


 NA  10/15/17 16:45


 11/14/17 16:44   


 


 


 Promethazine HCl


 12.5 mg/Sodium


 Chloride  50.5 ml @ 


 204 mls/hr  Q6H  PRN


 IV  10/15/17 22:15


 11/14/17 22:14  10/15/17 22:29


204 MLS/HR


 


 Albuterol/


 Ipratropium


  (Combivent


 Respimat Inh)  1 puffs  QID


 INH  10/17/17 13:00


 11/16/17 12:59  10/22/17 07:59


1 PUFFS


 


 Miscellaneous


 Information


  (Consult


 Glycemic


 Management


 Pharmacy)  1 ea  UD  PRN


 N/A  10/17/17 09:45


 11/16/17 09:44   


 


 


 Aspirin


  (Ecotrin Tab)  81 mg  DAILY


 PO  10/18/17 09:00


 11/17/17 08:59  10/22/17 07:58


81 MG


 


 Fenofibrate


  (Tricor)  200 mg  QAM


 PO  10/19/17 09:00


 11/18/17 08:59  10/22/17 07:59


200 MG


 


 Doxycycline


 Hyclate


  (Vibramycin Cap)  100 mg  BID


 PO  10/19/17 10:00


 10/24/17 23:59  10/22/17 07:58


100 MG


 


 Insulin Glargine


  (Lantus Solostar


 Pen)  18 units  BID


 SC  10/20/17 21:00


 11/19/17 20:59  10/22/17 08:15


18 UNITS


 


 Isosorbide


 Dinitrate


  (Isordil Tab)  10 mg  BID


 PO  10/21/17 09:00


 11/19/17 15:59  10/22/17 07:59


10 MG


 


 Bupropion HCl


  (Wellbutrin-Sr


 Tab)  150 mg  BID17


 PO  10/21/17 17:00


 11/14/17 20:59  10/21/17 20:50


150 MG


 


 Imipramine HCl


  (Tofranil Tab)  25 mg  HS


 PO  10/21/17 21:00


 11/14/17 20:59  10/21/17 20:50


25 MG


 


 Pregabalin


  (Lyrica Cap)  100 mg  BID


 PO  10/21/17 21:00


 11/20/17 20:59  10/22/17 07:56


100 MG

## 2017-10-22 NOTE — DIAGNOSTIC IMAGING REPORT
(CHEST) THORAX WITHOUT



CLINICAL HISTORY: Hypoxia    



COMPARISON STUDY:  Chest x-ray dated 10-16 17 



CT DOSE: 1151.81 mGy.cm



TECHNIQUE:  CT of the thorax was performed from the thoracic inlet to the lung

bases. Images are reviewed in the axial, sagittal, and coronal planes. IV

contrast was not administered for this examination.  A dose lowering technique

was utilized adhering to the principles of ALARA.





FINDINGS:



Thyroid: Imaged portions of the thyroid gland are normal in appearance.



Thoracic aorta: The thoracic aorta is normal in course and caliber, noting

standard 3 vessel arch anatomy.



Heart: There are coronary artery calcifications. There are postsurgical changes

of a midline sternotomy.



Lungs and pleural spaces: There are mild dependent atelectatic changes. There

are no areas of pulmonary consolidation to indicate pneumonia. There are

bilateral parenchymal opacities which are likely atelectatic. There is a 4 mm

solid pulmonary nodule with the lingula.



Mediastinum: There is no mediastinal lymphadenopathy.



Clementine: There is no evidence of pathologic hilar adenopathy given the limitations

of a noncontrast study.



Axilla: Clear.



Upper abdomen:  There is hepatic steatosis. The gallbladder surgically absent.



Skeletal structures: Multiple old left-sided rib fractures are visualized.



IMPRESSION:  

1. No evidence of pathologic adenopathy

2. No evidence for pneumonia. Bilateral parenchymal opacities which are felt to

be atelectatic

3. 4 mm solid pulmonary nodule within the lingula. A low risk patient, no

follow-up is indicated. In a high risk patient, a 12 month follow-up is

optional.

4. Hepatic steatosis



Please refer to below summary of Fleischner criteria recommendations for

follow-up of incidental CT nodules (ELIDIA Corbin, Guidelines for management of

small pulmonary nodules detected on CT scans:  A statement from the Fleischner

Society, Radiology 237: [phone].)



SOLID NODULES



Solitary nodule size: <6 mm

*  low risk patients:  no follow-up needed

*  high risk patients:  optional CT at 12 months



Solitary nodule size:  6-8 mm

*  low risk patients:  follow-up at 6-12 months, then consider further follow-up

at 18-24 months

*  high risk patients:  initial follow-up CT at 6-12 months and then at 18-24

months if no change



Solitary nodule size: >8 mm

*  either low or high risk patients - consider follow-up CT at 3 months, and/or

CT-PET, and/or biopsy



Multiple nodules size:  <6 mm

*  low risk patients:  no routine follow-up

*  high risk patients:  optional CT at 12 months



Multiple nodules size:  6-8 mm

*  low risk patients:  follow-up at 3-6 months, then consider further follow-up

at 18-24 months

*  high risk patients:  follow-up at 3-6 months, then at 18-24 months if no

change



Multiple nodules size:  >8 mm

*  low risk patients:  follow-up at 3-6 months, then consider further follow-up

at 18-24 months

*  high risk patients:  follow-up at 3-6 months, then at 18-24 months if no

change



Note:  newly detected indeterminate nodule in persons 35 years of age or older.

*  low risk patients:  minimal or absent history of smoking and/or other known

risk factors

*  high risk patients:  history of smoking or of other known risk factors (e.g.

first degree relative with lung cancer, or exposure to asbestos, radon, uranium)

*  if a nodule up to 8 mm is partly solid or is ground glass further follow-up

is required after 24 months to exclude possible slow growing adenocarcinoma

(YOVANI)



SUBSOLID NODULES



Solitary pure ground-glass nodule

*  nodule size <6 mm - no CT follow-up required

*  nodule size >=6 mm - follow-up CT at 6-12 months, then every 2 years until 5

years



Solitary part-solid nodule

*  nodule size <6 mm - no CT follow-up required

*  nodule size >=6 mm - follow-up CT at 3-6 months.  If unchanged, and solid

component remains <6 mm, then annual follow-up for 5 years



Multiple subsolid nodules

*  nodule size <6 mm - follow-up CT at 3-6 months, consider further follow-up at

2 and 4 years if stable

*  nodule size >=6 mm - follow-up CT at 3-6 months, subsequent management based

on the most suspicious nodule(s)







       







Electronically signed by:  Zen Thrasher M.D.

10/22/2017 2:48 PM



Dictated Date/Time:  10/22/2017 2:43 PM

## 2017-10-22 NOTE — PROGRESS NOTE
DATE: 10/22/2017

 

SUBJECTIVE:  Christiano was seen today, he still has a little tremor of the

outstretched hands and the peculiar general body tremulousness almost

myoclonic activity when he tries to rise from a supine posture to a seated

posture while in bed.  He has a little bit of this when he goes from a seated

posture to lying down, but most of this effort related on the arising

maneuver.  It is hard to classify this.  It looks probably to be in the class

of what we term postural tremor and it may be part of an essential tremor

picture.  He certainly has a typical feature of essential tremor of his

hands, which is mild.  The lyrica is being tapered down.  He is apparently

going to be discharged to an extended care facility or perhaps a personal

care facility and he should follow up in neurology with Ju Castellanos M.D. for an assessment in probably a month or so, just to

see if they have any more recommendations regarding potential treatment or

further reduction of his Lyrica.  I have him now down to 100 mg twice a day and 
I

think the plan was to drop that to that level and then he was to be seen in

the office, his pain assessed and if he was still relatively pain free from his

neuropathy drop even further.

 

The onset of this tremor is very difficult to establish, he thinks it has

been 2-3 years, but when I discussed the case with Ju Fierro, she does

not recall seeing much in the way of tremor other than perhaps some

tremulousness in the hands, so this postural tremor might be new or might

have been something he did not talk about in the clinic.  He is a little

tangential and distracted, I am not sure he does not have a significant 
cognitive

impairment syndrome.  So, history from him unfortunately is probably less

reliable.

 

He is going to be discharged tomorrow, so I suspect he will  need

to have an appointment to see Ju Fierro and Ju Castellanos in about

a month.  If not, Ju Fierro and Ju Castellanos will be assuming the

inpatient service tomorrow and can pick him up from there.

 

 

 

 

[facility]

## 2017-10-22 NOTE — PHARMACY PROGRESS NOTE
Glycemic: Assessment & Plan


Date of Service


Oct 22, 2017.





Assessment & Plan


ASSESSMENT:


* The patient is currently receiving 47 units of insulin per day. 


* BSGs ranging 99 - 126 mg/dl over the past 24hrs. 


* Fasting BSG at goal


* Post-prandial BSGs trending down throughout the day. Loosen carb coverage.





PLAN FOR INPATIENT GLYCEMIC CONTROL:





* Continue Lantus 18 units SQ BID





* Bolus insulin 


 * NovoLog per scale ACHS or Q6hrs while NPO


 * Goal Range:  Low 110 mg/dL - High 140 mg/dL


 * Correction Factor: 20 mg/dL/unit


 * Change carb ration to 1 unit per 10 grams CHO consumed





RECOMMENDATIONS FOR DISCHARGE:


* Patient's HbA1C within goal for patient (goal for patient is 7.6-8.0% based 

upon Elements of Diabetes Care Scoring Scale) .... as long as patient has no 

hypoglycemia at home it is reasonable to continue current regimen.





* Please note that the plan above was derived based on current level of insulin 

resistance and hospital stress. These recommendations are appropriate for 

inpatient admission only. Plan of care upon discharge will need to be 

reassessed to avoid potential outpatient hypo/hyperglycemia.

## 2017-10-23 VITALS
HEART RATE: 66 BPM | OXYGEN SATURATION: 93 % | DIASTOLIC BLOOD PRESSURE: 79 MMHG | SYSTOLIC BLOOD PRESSURE: 147 MMHG | TEMPERATURE: 97.34 F

## 2017-10-23 VITALS — OXYGEN SATURATION: 93 %

## 2017-10-23 VITALS
HEART RATE: 67 BPM | SYSTOLIC BLOOD PRESSURE: 108 MMHG | OXYGEN SATURATION: 93 % | DIASTOLIC BLOOD PRESSURE: 62 MMHG | TEMPERATURE: 97.88 F

## 2017-10-23 VITALS — OXYGEN SATURATION: 95 % | HEART RATE: 69 BPM

## 2017-10-23 VITALS — HEART RATE: 73 BPM | OXYGEN SATURATION: 90 %

## 2017-10-23 LAB
ANION GAP SERPL CALC-SCNC: 7 MMOL/L (ref 3–11)
BUN SERPL-MCNC: 38 MG/DL (ref 7–18)
BUN/CREAT SERPL: 14.1 (ref 10–20)
CALCIUM SERPL-MCNC: 8.6 MG/DL (ref 8.5–10.1)
CHLORIDE SERPL-SCNC: 107 MMOL/L (ref 98–107)
CO2 SERPL-SCNC: 27 MMOL/L (ref 21–32)
CREAT CL PREDICTED SERPL C-G-VRATE: 31.3 ML/MIN
CREAT SERPL-MCNC: 2.73 MG/DL (ref 0.6–1.4)
GLUCOSE SERPL-MCNC: 104 MG/DL (ref 70–99)
POTASSIUM SERPL-SCNC: 4 MMOL/L (ref 3.5–5.1)
SODIUM SERPL-SCNC: 141 MMOL/L (ref 136–145)

## 2017-10-23 RX ADMIN — DOXYCYCLINE HYCLATE SCH MG: 100 CAPSULE, GELATIN COATED ORAL at 20:39

## 2017-10-23 RX ADMIN — INSULIN ASPART SCH UNITS: 100 INJECTION, SOLUTION INTRAVENOUS; SUBCUTANEOUS at 20:34

## 2017-10-23 RX ADMIN — ROSUVASTATIN CALCIUM SCH MG: 20 TABLET, FILM COATED ORAL at 08:30

## 2017-10-23 RX ADMIN — LEVOTHYROXINE SODIUM SCH MCG: 50 TABLET ORAL at 06:02

## 2017-10-23 RX ADMIN — INSULIN GLARGINE SCH UNITS: 100 INJECTION, SOLUTION SUBCUTANEOUS at 20:48

## 2017-10-23 RX ADMIN — AMLODIPINE BESYLATE SCH MG: 5 TABLET ORAL at 08:31

## 2017-10-23 RX ADMIN — ISOSORBIDE DINITRATE SCH MG: 5 TABLET ORAL at 08:31

## 2017-10-23 RX ADMIN — CLOPIDOGREL BISULFATE SCH MG: 75 TABLET, FILM COATED ORAL at 08:31

## 2017-10-23 RX ADMIN — INSULIN ASPART SCH UNITS: 100 INJECTION, SOLUTION INTRAVENOUS; SUBCUTANEOUS at 12:17

## 2017-10-23 RX ADMIN — IPRATROPIUM BROMIDE AND ALBUTEROL SCH PUFFS: 20; 100 SPRAY, METERED RESPIRATORY (INHALATION) at 17:53

## 2017-10-23 RX ADMIN — HEPARIN SODIUM SCH UNIT: 10000 INJECTION, SOLUTION INTRAVENOUS; SUBCUTANEOUS at 14:34

## 2017-10-23 RX ADMIN — PREGABALIN SCH MG: 100 CAPSULE ORAL at 20:36

## 2017-10-23 RX ADMIN — IMIPRAMINE HYDROCHLORIDE SCH MG: 25 TABLET, FILM COATED ORAL at 20:51

## 2017-10-23 RX ADMIN — METOPROLOL SUCCINATE SCH MG: 25 TABLET, EXTENDED RELEASE ORAL at 20:40

## 2017-10-23 RX ADMIN — INSULIN ASPART SCH UNITS: 100 INJECTION, SOLUTION INTRAVENOUS; SUBCUTANEOUS at 17:57

## 2017-10-23 RX ADMIN — Medication SCH MG: at 08:31

## 2017-10-23 RX ADMIN — HEPARIN SODIUM SCH UNIT: 10000 INJECTION, SOLUTION INTRAVENOUS; SUBCUTANEOUS at 05:43

## 2017-10-23 RX ADMIN — PREGABALIN SCH MG: 100 CAPSULE ORAL at 08:37

## 2017-10-23 RX ADMIN — DULOXETINE SCH MG: 30 CAPSULE, DELAYED RELEASE PELLETS ORAL at 20:39

## 2017-10-23 RX ADMIN — Medication SCH TAB: at 08:31

## 2017-10-23 RX ADMIN — INSULIN GLARGINE SCH UNITS: 100 INJECTION, SOLUTION SUBCUTANEOUS at 08:42

## 2017-10-23 RX ADMIN — BUPROPION HYDROCHLORIDE SCH MG: 150 TABLET, EXTENDED RELEASE ORAL at 08:30

## 2017-10-23 RX ADMIN — IPRATROPIUM BROMIDE AND ALBUTEROL SCH PUFFS: 20; 100 SPRAY, METERED RESPIRATORY (INHALATION) at 08:29

## 2017-10-23 RX ADMIN — DULOXETINE SCH MG: 60 CAPSULE, DELAYED RELEASE PELLETS ORAL at 08:30

## 2017-10-23 RX ADMIN — Medication SCH MG: at 20:38

## 2017-10-23 RX ADMIN — HEPARIN SODIUM SCH UNIT: 10000 INJECTION, SOLUTION INTRAVENOUS; SUBCUTANEOUS at 20:48

## 2017-10-23 RX ADMIN — BUPROPION HYDROCHLORIDE SCH MG: 150 TABLET, EXTENDED RELEASE ORAL at 17:54

## 2017-10-23 RX ADMIN — IPRATROPIUM BROMIDE AND ALBUTEROL SCH PUFFS: 20; 100 SPRAY, METERED RESPIRATORY (INHALATION) at 20:37

## 2017-10-23 RX ADMIN — PANTOPRAZOLE SCH MG: 40 TABLET, DELAYED RELEASE ORAL at 08:30

## 2017-10-23 RX ADMIN — FENOFIBRATE SCH MG: 200 CAPSULE ORAL at 08:32

## 2017-10-23 RX ADMIN — IPRATROPIUM BROMIDE AND ALBUTEROL SCH PUFFS: 20; 100 SPRAY, METERED RESPIRATORY (INHALATION) at 12:18

## 2017-10-23 RX ADMIN — INSULIN ASPART SCH UNITS: 100 INJECTION, SOLUTION INTRAVENOUS; SUBCUTANEOUS at 08:35

## 2017-10-23 RX ADMIN — ISOSORBIDE DINITRATE SCH MG: 5 TABLET ORAL at 20:38

## 2017-10-23 RX ADMIN — DOXYCYCLINE HYCLATE SCH MG: 100 CAPSULE, GELATIN COATED ORAL at 08:30

## 2017-10-23 RX ADMIN — Medication SCH MG: at 08:30

## 2017-10-23 NOTE — PROGRESS NOTE
Medicine Progress Note


Date & Time of Visit:


Oct 23, 2017 at 18:47.


Subjective


seen resting in bed, comfortable


wife at bedside 


no dyspnea


shaking has improved much


no other symptoms





Objective





Last 8 Hrs








  Date Time  Temp Pulse Resp B/P (MAP) Pulse Ox O2 Delivery O2 Flow Rate FiO2


 


10/23/17 16:00      Room Air  


 


10/23/17 14:57 36.6 67 18 108/62 (77) 93 Room Air  








Physical Exam:


General- oriented x 3, not in distress, speaks in sentences with no effort





Neck- no JVD





Lungs- clear breath sounds bilaterally





Heart- regular rhythm; no murmur, normal rate





Abdomen- normal bowel sounds, soft, nontender





Extremities- trace lower leg edema, no calf tenderness





Neuro- alert, oriented x 3;no gross focal deficits





Skin- warm & dry


Laboratory Results:





Last 24 Hours








Test


  10/22/17


20:07 10/23/17


05:17 10/23/17


07:20 10/23/17


11:39


 


Bedside Glucose 94 mg/dl   107 mg/dl  176 mg/dl 


 


Sodium Level  141 mmol/L   


 


Potassium Level  4.0 mmol/L   


 


Chloride Level  107 mmol/L   


 


Carbon Dioxide Level  27 mmol/L   


 


Anion Gap  7.0 mmol/L   


 


Blood Urea Nitrogen  38 mg/dl   


 


Creatinine  2.73 mg/dl   


 


Est Creatinine Clear Calc


Drug Dose 


  31.3 ml/min 


  


  


 


 


Estimated GFR (


American) 


  25.8 


  


  


 


 


Estimated GFR (Non-


American 


  22.2 


  


  


 


 


BUN/Creatinine Ratio  14.1   


 


Random Glucose  104 mg/dl   


 


Calcium Level  8.6 mg/dl   


 


Test


  10/23/17


16:02 


  


  


 


 


Bedside Glucose 110 mg/dl    











Assessment & Plan


This is a 72 year old male with a PMH of CAD s/p CABGx2, Hx. of Cardiac Arrest 

and V-Fib s/p AICD placement, insulin dependent DM2 with complications 

including peripheral vascular disease s/p R foot transmetatarsal amputation, 

CKD stage IV, SABRA on nocturnal O2, Hypothyroidism, Mood Disorder with 

depression - presents with shortness of breath





ACUTE ON LIKELY CHRONIC DIASTOLIC CHF, Resolved


- echo noted


 clinically improved with Lasix one dose


  Isordil TID added


  will need Lasix PRN 2-3x a week, but need to closely watch crea





- possible component of Acute Bronchitis?


 on Doxycycline Day 7/7


- appreciate Cardio SVC input





TREMORS/SHAKES


- multifactorial





- from multiple psych meds?


  Psych consulted, medication doses decreased


  Lyrica also tapered down





- from SABRA/ non adherence of CPAP?


  encouraged CPAP use and patient agreed, has been adherent for the past 3 days





- r/o seizure


  EEG Negative





- r/o Cerebellar Atrophy


  cannot perform MRI as patient has pacemaker


  CT head w/o contrast(+) old infarcts





- from elevated Ammonia?


  Liver US: fatty liver


  GI consulted, likely noncontributory


  d/c lactulose





- improving 


-appreciate Neuro recommendations


 anticipate d/c to Rehab





DM 2


patient is on a high dose of insulin, takes 52 units twice daily of Lantus as 

well as a sliding scale of Novolog


a1c 7.0


- (+) hypoglycemia the other day


- Lantus decreased


 appreciate Pharm SVC recommendations





HTN


elevated


Isordil added, increased to 10mg BID


- BP improving, monitor





CKD stage IV


patient states that he was to have further kidney w/up


- crea trending up, baseline 2.4


  today 2.7


- will need to monitor crea


- Nephrology consulted





Hx. of CAD s/p CABGx2


Hx. of cardiac arrest and V-Fib s/p AICD


cardiac markers negative


echo noted


on Plavix, Aspirin





SABRA


did not tolerate CPAP


uses 2L O2 nocturnally


check nocturnal pulse ox as SOB is worse at night


-- needs at least O2 supplement at HS


  discussed in detail with patient today re: importance of CPAP use and 

consequences of not using it


  he agreed to try CPAP again 


   CPAP as inpatient while sleeping





Hypothyroidism


continue current dose of Synthroid





Mood Disorder and Depression


Psych consulted for medication review





DVT ppx


subq heparin





FULL CODE





Disposition


PT recommending Rehab


anticipate d/c to Rehab when approved


Current Inpatient Medications:





Current Inpatient Medications








 Medications


  (Trade)  Dose


 Ordered  Sig/Richard


 Route  Start Time


 Stop Time Status Last Admin


Dose Admin


 


 Heparin Sodium


  (Porcine)


  (Heparin Sq 5000


 Unit/0.5ml)  5,000 unit  Q8


 SQ  10/15/17 14:00


 11/14/17 13:59  10/23/17 14:34


5,000 UNIT


 


 Insulin Aspart


  (novoLOG ASPART)  **SLIDING


 SCALE**


 **If C...  ACHS


 SC  10/15/17 11:00


 11/14/17 10:59  10/23/17 17:57


3 UNITS


 


 Glucose


  (Glucose 40% Gel)  15-30


 GRAMS 15


 GRAMS...  UD  PRN


 PO  10/15/17 10:45


 11/14/17 10:44   


 


 


 Glucose


  (Glucose Chew


 Tab)  4-8


 Tablets 4


 Tabl...  UD  PRN


 PO  10/15/17 10:45


 11/14/17 10:44   


 


 


 Dextrose


  (Dextrose 50%


 50ML Syringe)  25-50ML OF


 50% DW IV


 FOR...  UD  PRN


 IV  10/15/17 10:45


 11/14/17 10:44   


 


 


 Glucagon


  (Glucagon Inj)  1 mg  UD  PRN


 SQ  10/15/17 10:45


 11/14/17 10:44   


 


 


 Amlodipine


 Besylate


  (Norvasc Tab)  10 mg  DAILY


 PO  10/16/17 09:00


 11/15/17 08:59  10/23/17 08:31


10 MG


 


 Clopidogrel


 Bisulfate


  (plAVix TAB)  75 mg  DAILY


 PO  10/16/17 09:00


 11/15/17 08:59  10/23/17 08:31


75 MG


 


 Duloxetine HCl


  (Cymbalta Cap)  30 mg  HS


 PO  10/15/17 21:00


 11/14/17 20:59  10/22/17 21:06


30 MG


 


 Duloxetine HCl


  (Cymbalta Cap)  60 mg  DAILY


 PO  10/16/17 09:00


 11/15/17 08:59  10/23/17 08:30


60 MG


 


 Levothyroxine


 Sodium


  (Synthroid Tab)  50 mcg  DAILYBB


 PO  10/16/17 06:30


 11/15/17 06:59  10/23/17 06:02


50 MCG


 


 Magnesium Oxide


  (Mag-Ox Tab)  400 mg  BID


 PO  10/15/17 21:00


 11/14/17 20:59  10/23/17 08:31


400 MG


 


 Metoprolol


 Succinate


  (Toprol Xl Tab)  37.5 mg  HS


 PO  10/15/17 21:00


 11/14/17 20:59  10/22/17 21:07


37.5 MG


 


 Multivitamins


  (Multivitamin


 Tab)  1 tab  DAILY


 PO  10/16/17 09:00


 11/15/17 08:59  10/23/17 08:31


1 TAB


 


 Pantoprazole


 Sodium


  (Protonix Tab)  40 mg  DAILY


 PO  10/16/17 09:00


 11/15/17 08:59  10/23/17 08:30


40 MG


 


 Rosuvastatin


 Calcium


  (Crestor Tab)  40 mg  DAILY


 PO  10/16/17 09:00


 11/15/17 08:59  10/23/17 08:30


40 MG


 


 Sodium Chloride


  (Ocean Nasal


 Spray)  1 sprays  PRN  PRN


 NA  10/15/17 16:45


 11/14/17 16:44   


 


 


 Promethazine HCl


 12.5 mg/Sodium


 Chloride  50.5 ml @ 


 204 mls/hr  Q6H  PRN


 IV  10/15/17 22:15


 11/14/17 22:14  10/15/17 22:29


204 MLS/HR


 


 Albuterol/


 Ipratropium


  (Combivent


 Respimat Inh)  1 puffs  QID


 INH  10/17/17 13:00


 11/16/17 12:59  10/23/17 17:53


1 PUFFS


 


 Miscellaneous


 Information


  (Consult


 Glycemic


 Management


 Pharmacy)  1 ea  UD  PRN


 N/A  10/17/17 09:45


 11/16/17 09:44   


 


 


 Aspirin


  (Ecotrin Tab)  81 mg  DAILY


 PO  10/18/17 09:00


 11/17/17 08:59  10/23/17 08:30


81 MG


 


 Fenofibrate


  (Tricor)  200 mg  QAM


 PO  10/19/17 09:00


 11/18/17 08:59  10/23/17 08:32


200 MG


 


 Doxycycline


 Hyclate


  (Vibramycin Cap)  100 mg  BID


 PO  10/19/17 10:00


 10/24/17 23:59  10/23/17 08:30


100 MG


 


 Insulin Glargine


  (Lantus Solostar


 Pen)  18 units  BID


 SC  10/20/17 21:00


 11/19/17 20:59  10/23/17 08:42


18 UNITS


 


 Isosorbide


 Dinitrate


  (Isordil Tab)  10 mg  BID


 PO  10/21/17 09:00


 11/19/17 15:59  10/23/17 08:31


10 MG


 


 Bupropion HCl


  (Wellbutrin-Sr


 Tab)  150 mg  BID17


 PO  10/21/17 17:00


 11/14/17 20:59  10/23/17 17:54


150 MG


 


 Imipramine HCl


  (Tofranil Tab)  25 mg  HS


 PO  10/21/17 21:00


 11/14/17 20:59  10/22/17 21:09


25 MG


 


 Pregabalin


  (Lyrica Cap)  100 mg  BID


 PO  10/21/17 21:00


 11/20/17 20:59  10/23/17 08:37


100 MG

## 2017-10-24 VITALS
DIASTOLIC BLOOD PRESSURE: 76 MMHG | HEART RATE: 60 BPM | OXYGEN SATURATION: 95 % | TEMPERATURE: 98.42 F | SYSTOLIC BLOOD PRESSURE: 129 MMHG

## 2017-10-24 VITALS
SYSTOLIC BLOOD PRESSURE: 115 MMHG | HEART RATE: 71 BPM | TEMPERATURE: 98.42 F | DIASTOLIC BLOOD PRESSURE: 62 MMHG | OXYGEN SATURATION: 91 %

## 2017-10-24 VITALS
HEART RATE: 70 BPM | DIASTOLIC BLOOD PRESSURE: 77 MMHG | TEMPERATURE: 97.88 F | SYSTOLIC BLOOD PRESSURE: 142 MMHG | OXYGEN SATURATION: 96 %

## 2017-10-24 VITALS
HEART RATE: 70 BPM | OXYGEN SATURATION: 96 % | SYSTOLIC BLOOD PRESSURE: 142 MMHG | DIASTOLIC BLOOD PRESSURE: 77 MMHG | TEMPERATURE: 97.88 F

## 2017-10-24 RX ADMIN — Medication SCH MG: at 08:21

## 2017-10-24 RX ADMIN — IPRATROPIUM BROMIDE AND ALBUTEROL SCH PUFFS: 20; 100 SPRAY, METERED RESPIRATORY (INHALATION) at 12:29

## 2017-10-24 RX ADMIN — PANTOPRAZOLE SCH MG: 40 TABLET, DELAYED RELEASE ORAL at 08:23

## 2017-10-24 RX ADMIN — PREGABALIN SCH MG: 100 CAPSULE ORAL at 08:20

## 2017-10-24 RX ADMIN — INSULIN ASPART SCH UNITS: 100 INJECTION, SOLUTION INTRAVENOUS; SUBCUTANEOUS at 12:29

## 2017-10-24 RX ADMIN — FENOFIBRATE SCH MG: 200 CAPSULE ORAL at 08:23

## 2017-10-24 RX ADMIN — DULOXETINE SCH MG: 60 CAPSULE, DELAYED RELEASE PELLETS ORAL at 08:22

## 2017-10-24 RX ADMIN — HEPARIN SODIUM SCH UNIT: 10000 INJECTION, SOLUTION INTRAVENOUS; SUBCUTANEOUS at 05:29

## 2017-10-24 RX ADMIN — AMLODIPINE BESYLATE SCH MG: 5 TABLET ORAL at 08:22

## 2017-10-24 RX ADMIN — LEVOTHYROXINE SODIUM SCH MCG: 50 TABLET ORAL at 05:29

## 2017-10-24 RX ADMIN — INSULIN GLARGINE SCH UNITS: 100 INJECTION, SOLUTION SUBCUTANEOUS at 08:27

## 2017-10-24 RX ADMIN — CLOPIDOGREL BISULFATE SCH MG: 75 TABLET, FILM COATED ORAL at 08:21

## 2017-10-24 RX ADMIN — ISOSORBIDE DINITRATE SCH MG: 5 TABLET ORAL at 08:21

## 2017-10-24 RX ADMIN — IPRATROPIUM BROMIDE AND ALBUTEROL SCH PUFFS: 20; 100 SPRAY, METERED RESPIRATORY (INHALATION) at 08:24

## 2017-10-24 RX ADMIN — Medication SCH MG: at 08:24

## 2017-10-24 RX ADMIN — DOXYCYCLINE HYCLATE SCH MG: 100 CAPSULE, GELATIN COATED ORAL at 08:24

## 2017-10-24 RX ADMIN — BUPROPION HYDROCHLORIDE SCH MG: 150 TABLET, EXTENDED RELEASE ORAL at 08:22

## 2017-10-24 RX ADMIN — Medication SCH TAB: at 08:23

## 2017-10-24 RX ADMIN — HEPARIN SODIUM SCH UNIT: 10000 INJECTION, SOLUTION INTRAVENOUS; SUBCUTANEOUS at 14:00

## 2017-10-24 RX ADMIN — INSULIN ASPART SCH UNITS: 100 INJECTION, SOLUTION INTRAVENOUS; SUBCUTANEOUS at 08:26

## 2017-10-24 RX ADMIN — ROSUVASTATIN CALCIUM SCH MG: 20 TABLET, FILM COATED ORAL at 08:23

## 2017-10-24 NOTE — DISCHARGE SUMMARY
Discharge Summary


Date of Service


Oct 24, 2017.





Discharge Summary


Admission Date:


Oct 15, 2017 at 10:39


Discharge Disposition:  Skilled nursing facility


Principal Diagnosis:





ACUTE ON LIKELY CHRONIC DIASTOLIC CHF, Resolved


Secondary Diagnoses/Problems:


Please refer to hospital course below for further details.


Procedures:








ECHO


* -- Conclusions --


* Technically limited study.


* Left ventricular systolic function is normal.


* Small area of akinesis involving the distal anterior wall and adjacent 

anteroseptum.


* Ejection Fraction = 55-60%.


* There is mild concentric left ventricular hypertrophy.


* There is mild right ventricular hypertrophy.


* Diastolic dysfunction, Grade II (pseudonormalization pattern).


* No obvious valvular pathology.





CT HEAD WITHOUT CONTRAST (CT)





CLINICAL HISTORY: tremors    





COMPARISON STUDY:  No previous studies for comparison.





TECHNIQUE:  Axial CT of the brain is performed from the vertex to the skull


base. IV contrast was not administered for this examination. A dose lowering


technique was utilized adhering to the principles of ALARA.


 





CT DOSE: 614.27 mGy.cm





FINDINGS:





No intra or extra-axial mass lesions are visualized. There is no CT evidence of


acute cortical infarction. There is no evidence of midline shift. There is no


acute  hemorrhage. No calvarial fractures are visualized. 


There are patchy white matter hypodensities likely on a small vessel basis.


There are bilateral basal ganglia lacunar infarcts.


There is no evidence of pathologic ventricular dilatation.


There is no evidence of acute sinusitis





IMPRESSION: 


1. No evidence of intracranial mass in this noncontrast study


2. Foci of decreased attenuation within the white matter likely a small vessel


basis. Bilateral basal ganglia lacunar infarcts


3. No acute intracranial findings





ABDOMINAL ULTRASOUND, RIGHT UPPER QUADRANT





HISTORY:      elevated ammonia, r/o liver cirrhosis.





COMPARISON:  None.





FINDINGS:





Pancreas: The pancreatic tail is obscured by overlying bowel gas. The remaining


portions of the pancreas are within normal limits.





Liver: The liver is echogenic consistent with fatty change. 21 cm in length.





Gallbladder: The gallbladder is surgically absent.





CBD: 5 mm.





Right kidney: No hydronephrosis.





IMPRESSION: 





1. Cholecystectomy.


2. Hepatomegaly demonstrating fatty change.








(CHEST) THORAX WITHOUT





CLINICAL HISTORY: Hypoxia    





COMPARISON STUDY:  Chest x-ray dated 10-16 17 





CT DOSE: 1151.81 mGy.cm





TECHNIQUE:  CT of the thorax was performed from the thoracic inlet to the lung


bases. Images are reviewed in the axial, sagittal, and coronal planes. IV


contrast was not administered for this examination.  A dose lowering technique


was utilized adhering to the principles of ALARA.








FINDINGS:





Thyroid: Imaged portions of the thyroid gland are normal in appearance.





Thoracic aorta: The thoracic aorta is normal in course and caliber, noting


standard 3 vessel arch anatomy.





Heart: There are coronary artery calcifications. There are postsurgical changes


of a midline sternotomy.





Lungs and pleural spaces: There are mild dependent atelectatic changes. There


are no areas of pulmonary consolidation to indicate pneumonia. There are


bilateral parenchymal opacities which are likely atelectatic. There is a 4 mm


solid pulmonary nodule with the lingula.





Mediastinum: There is no mediastinal lymphadenopathy.





Clementine: There is no evidence of pathologic hilar adenopathy given the limitations


of a noncontrast study.





Axilla: Clear.





Upper abdomen:  There is hepatic steatosis. The gallbladder surgically absent.





Skeletal structures: Multiple old left-sided rib fractures are visualized.





IMPRESSION:  


1. No evidence of pathologic adenopathy


2. No evidence for pneumonia. Bilateral parenchymal opacities which are felt to


be atelectatic


3. 4 mm solid pulmonary nodule within the lingula. A low risk patient, no


follow-up is indicated. In a high risk patient, a 12 month follow-up is


optional.


4. Hepatic steatosis





Please refer to below summary of Fleischner criteria recommendations for


follow-up of incidental CT nodules (ELIDIA Corbin, Guidelines for management of


small pulmonary nodules detected on CT scans:  A statement from the Fleischner


Society, Radiology 237: [phone].)





SOLID NODULES





Solitary nodule size: <6 mm


*  low risk patients:  no follow-up needed


*  high risk patients:  optional CT at 12 months





Solitary nodule size:  6-8 mm


*  low risk patients:  follow-up at 6-12 months, then consider further follow-up


at 18-24 months


*  high risk patients:  initial follow-up CT at 6-12 months and then at 18-24


months if no change





Solitary nodule size: >8 mm


*  either low or high risk patients - consider follow-up CT at 3 months, and/or


CT-PET, and/or biopsy





Multiple nodules size:  <6 mm


*  low risk patients:  no routine follow-up


*  high risk patients:  optional CT at 12 months





Multiple nodules size:  6-8 mm


*  low risk patients:  follow-up at 3-6 months, then consider further follow-up


at 18-24 months


*  high risk patients:  follow-up at 3-6 months, then at 18-24 months if no


change





Multiple nodules size:  >8 mm


*  low risk patients:  follow-up at 3-6 months, then consider further follow-up


at 18-24 months


*  high risk patients:  follow-up at 3-6 months, then at 18-24 months if no


change





Note:  newly detected indeterminate nodule in persons 35 years of age or older.


*  low risk patients:  minimal or absent history of smoking and/or other known


risk factors


*  high risk patients:  history of smoking or of other known risk factors (e.g.


first degree relative with lung cancer, or exposure to asbestos, radon, uranium)


*  if a nodule up to 8 mm is partly solid or is ground glass further follow-up


is required after 24 months to exclude possible slow growing adenocarcinoma


(BAC)





SUBSOLID NODULES





Solitary pure ground-glass nodule


*  nodule size <6 mm - no CT follow-up required


*  nodule size >=6 mm - follow-up CT at 6-12 months, then every 2 years until 5


years





Solitary part-solid nodule


*  nodule size <6 mm - no CT follow-up required


*  nodule size >=6 mm - follow-up CT at 3-6 months.  If unchanged, and solid


component remains <6 mm, then annual follow-up for 5 years





Multiple subsolid nodules


*  nodule size <6 mm - follow-up CT at 3-6 months, consider further follow-up at


2 and 4 years if stable


*  nodule size >=6 mm - follow-up CT at 3-6 months, subsequent management based


on the most suspicious nodule(s)


Consultations:


CARDIOLOGIST DR. GAONA, NEUROLOGIST DR. WILLS, NEPHROLOGIST DR. BUI, 

PSYCHIATRIST DR. MARK


Pending Studies/Follow-Up:


MONITOR VITAL SIGNS, VOLUME STATUS DAILY. 


MONITOR RENAL FUNCTION NOW THAT PATIENT IS ON LASIX TWICE A WEEK.


FALL PRECAUTIONS PLEASE.





FOLLOW UP WITH PRIMARY CARE PHYSICIAN DR. SUTTON ON TUESDAY OCTOBER 31, 2017 

AT 2:45PM.


FOLLOW UP WITH CARDIOLOGIST, NEUROLOGIST, NEPHROLOGIST, PSYCHIATRIST IN 2 WEEKS.








CPAP AT NIGHT AND WHILE SLEEPING:


Delivery Mode BiPap 


* CPAP


Respiratory Treatment Method


* Facial BiPAP Mask


Expiratory Airway Pressure


* 10 cm H2O


Leakage 


* 96 % (0-40) H


Oxygen Flow Rate


* 2.0 L/min


Duration Of Treatment 


* At Night


(EPAP) Patient 


* 10.0 cm H2O





PLEASE REFER TO HOSPITAL COURSE BELOW FOR FURTHER DETAILS.





Medication Reconciliation


New Medications:  


Furosemide (Lasix) 20 Mg Tab


20 MG PO 2XWK for 30 Days, #8 TAB 2 Refills





Bupropion HCl (Bupropion HCl Sr) 150 Mg Tabcr


150 MG PO BID17 for 30 Days





Insulin Aspart (Novolog Flexpen) 100 Units/Ml Inj


0 UNITS SC ACHS for 30 Days





Insulin Glargine (Lantus Solostar) 100 Unit/Ml Inj


18 UNITS SC BID for 30 Days





Ipratropium-Albuterol (Combivent Respimat) 1 Aer Aer


1 PUFFS INH QID PRN for SOB/Wheezing for 30 Days





Isosorbide Dinitrate (Isosorbide Dinitrate) 5 Mg Tab


10 MG PO BID for 30 Days





Pregabalin (Lyrica) 100 Mg Cap


100 MG PO BID for 7 Days





 


Changed Medications:  


Home O2 Therapy (Oxygen)  Gas


2 LITERS NA DAILY PRN for shortness of breath/hypoxia for 30 Days (Medication 

details modified)





 


Continued Medications:  


Allopurinol (Zyloprim) 300 Mg Tab


300 MG PO DAILY, TAB





Amlodipine (Norvasc) 10 Mg Tab


10 MG PO DAILY, TAB





Aspirin (Aspirin Ec) 81 Mg Tab


81 MG PO DAILY





Cholecalciferol (Vitamin D3) 1,000 Unit Tab


2 TAB PO DAILY, TAB





Clopidogrel (Plavix) 75 Mg Tab


75 MG PO DAILY, TAB





Duloxetine Hcl (Cymbalta) 60 Mg Cap


60 MG PO DAILY, CAP





Duloxetine HCl (Cymbalta) 30 Mg Cap


1 CAP PO HS, CAP





Fenofibrate (Tricor) 200 Mg Cap


200 MG PO DAILY, CAP





Levothyroxine Sodium (Levothyroxine Sodium) 50 Mcg Tab


1 TAB PO DAILYBB, TAB





Magnesium Oxide (Mag-Ox) 400 Mg Tab


400 MG PO BID, TAB





Metoprolol Succ (Toprol Xl) (Toprol-Xl) 25 Mg Tabcr


1.5 TAB PO HS, TAB





Multivitamin (Multivitamin)  Tab


1 TAB PO DAILY, TAB





Nitroglycerin (Nitrostat) 0.4 Mg Tab


0.4 MG UT PRN, BTL





Omega-3 Fatty Acids (Fish Oil 1200 mg) 1 Cap Cap


2 CAP PO BID





Pantoprazole (Protonix) 40 Mg Tab


40 MG PO DAILY, TAB





Rosuvastatin Calcium (Crestor) 40 Mg Tab


40 MG PO DAILY, TAB





 


Discontinued Medications:  


Bupropion Hcl (Bupropion Hcl Er) 150 Mg Tab


150 MG PO DAILY for Depression





Imipramine Hcl (Tofranil) 25 Mg Tab


25 MG PO QAM, TAB





Imipramine Hcl (Tofranil) 25 Mg Tab


2 TAB PO HS, TAB





Insulin Aspart (Novolog) 100 Units/Ml Inj


29 UNITS SC AC


PLUS ADDITIONAL 2 UNITS FOR EACH 50 ABOVE 150


Insulin Glargine (Lantus Solostar) 100 Unit/Ml Inj


52 UNITS SC AMPM





Oxymetazoline Hcl (Afrin 0.05% Nasal Spray) 1 Btl Spray


2 SPRAYS ESTEVAN BID PRN for Nasal Congestion, BTL





Pregabalin (Lyrica) 150 Mg Cap


150 MG PO BID, CAP











Admission Information


HPI (per Admitting provider):


This is a 72 year old male with a PMH of CAD s/p CABGx2, Hx. of Cardiac Arrest 

and V-Fib s/p AICD placement, insulin dependent DM2 with complications 

including peripheral vascular disease s/p R foot transmetatarsal amputation, 

CKD stage IV, SABRA on nocturnal O2, Hypothyroidism, Mood Disorder with 

depression - presents with shortness of breath. As per patient, he has been 

having shortness of breath and "throat pain" for the past three weeks. He has 

seen his primary care physician; and was given antibiotics at that time. He 

said he never fully recovered. Last evening, while laying down, he developed 

worsening shortness of breath. He uses O2 nocturnally due to sleep apnea (did 

not tolerate CPAP mask); and he turned it up to 3L of O2 with no help. He also 

developed some chest/throat pain. Denies fevers/chills. Denies nausea/vomiting.


Physical Exam (per Admitting):  


   General Appearance:  no apparent distress, + obese


   Head:  normocephalic, atraumatic


   Eyes:  normal inspection


   ENT:  hearing grossly normal


   Respiratory/Chest:  chest non-tender, lungs clear, normal breath sounds, no 

respiratory distress, no accessory muscle use


   Cardiovascular:  regular rate, rhythm, no edema, no gallop, no JVD, no murmur

, normal peripheral pulses


   Abdomen/GI:  normal bowel sounds, non tender, soft


   Back:  no muscle spasm


   Extremities/Musculoskelatal:  normal inspection, no calf tenderness, normal 

capillary refill, no pedal edema, normal range of motion, + pertinent finding (

R transmetatarsal amputation)


   Neurologic/Psych:  no motor/sensory deficits, alert, normal mood/affect


   Skin:  normal color


   Lymphatic:  no adenopathy





Hospital Course


This is a 72 year old male with a PMH of CAD s/p CABGx2, Hx. of Cardiac Arrest 

and V-Fib s/p AICD placement, insulin dependent DM2 with complications 

including peripheral vascular disease s/p R foot transmetatarsal amputation, 

CKD stage IV, SABRA on nocturnal O2, Hypothyroidism, Mood Disorder with 

depression - presents with shortness of breath





ACUTE ON LIKELY CHRONIC DIASTOLIC CHF, Resolved


- ech:


* -- Conclusions --


* Technically limited study.


* Left ventricular systolic function is normal.


* Small area of akinesis involving the distal anterior wall and adjacent 

anteroseptum.


* Ejection Fraction = 55-60%.


* There is mild concentric left ventricular hypertrophy.


* There is mild right ventricular hypertrophy.


* Diastolic dysfunction, Grade II (pseudonormalization pattern).


* No obvious valvular pathology.


  Cardiology Service Dr. Gaona consulted


  Isordil added


  Lasix given, will need Lasix 20mg po 2x a week


  monitor BP and volume status closely


  ff up with Cardiologist Dr. Gaona in 2 weeks





- possible component of Acute Bronchitis


  complete Doxycycline x 7 days





TREMORS/SHAKES


- multifactorial


- patient noted to have severe tremors/shakes to the point that he was falling 

while ambulating/transfers


  Neurologist Dr. Wills and Psychiatrist Dr. Mark consulted





- from multiple psych meds


  Psych consulted, medication doses decreased


  Lyrica also tapered down


  monitor as outpatient





- from SABRA/ non adherence of CPAP


  encouraged CPAP use and patient agreed, has been adherent so far


  continue to encourage use of CPAP when sleeping





- seizure ruled out


  EEG Negative





- Cerebellar Atrophy ruled out


  cannot perform MRI as patient has pacemaker


  CT head w/o contrast (+) old infarcts





- improving gradually


  continue PT and OT


  Fall Precautions


  ff up with  Neurologist Dr. Wills  and outpatient Psychiatrist in 1-2 weeks








DM 2


patient is on a high dose of insulin, takes 52 units twice daily of Lantus as 

well as a sliding scale of Novolog


a1c 7.0


Pharmacy Glycemic Control consulted


- continue current inpatient Insulin regimen:


   Lantus 18 units SQ BID


* Bolus insulin 


 * NovoLog per scale ACHS or Q6hrs while NPO


 * Goal Range:  Low 110 mg/dL - High 140 mg/dL


 * Correction Factor: 20 mg/dL/unit


 * Nutritional / Prandial insulin per carb ratio of 1 unit per 10 grams CHO 

consumed





HTN


elevated


Isordil added, increased to 10mg BID


- BP improving, monitor





CKD stage IV


- crea within baseline overall


- will need to monitor crea closely while on Lasix twice a wee


- Nephrology consulted


  ff up with Dr. Bui in 1-2 weeks





Hx. of CAD s/p CABGx2


Hx. of Cardiac arrest and V-Fib s/p AICD


cardiac markers negative


echo as above


on Plavix, Aspirin





OBSTRUCTIVE SLEEP APNEA


uses 2L O2 nocturnally


nocturnal pulse ox as SOB is worse at night


-- discussed in detail with patient  re: importance of CPAP use and 

consequences of not using it


   he agreed to try CPAP and has been adherent since


   continue to encourage CPAP use





Hypothyroidism


continue current dose of Synthroid





Mood Disorder and Depression


Psych consulted for medication adjustments in light of tremors/shaking


Tofranil tapered off, Cymbalta and Wellbutrin continued


continue ff up with outpatient Psychiatrist








ABNORMAL CT SCAN FINDINGS


- 4 mm solid pulmonary nodule within the lingula. A low risk patient, no


follow-up is indicated. In a high risk patient, a 12 month follow-up is


optional.


 Hepatic steatosis


- full CT chest report noted at the Procedure section above


- follow up as outpatient





DVT ppx


subq heparin given





FULL CODE





Disposition


transition to Rehab


FOLLOW UP WITH PRIMARY CARE PHYSICIAN DR. SUTTON ON TUESDAY OCTOBER 31, 2017 

AT 2:45PM.


ff up with Cardiologist Dr. Gaona, Neurologist Dr. Wills, Nephrologist Dr. Bui in 2 weeks.


Total time spent on discharge = 50 mins


This includes examination of the patient, discharge planning, medication 

reconciliation, and communication with other providers.





Discharge Instructions


Discharge Instructions


Date of Service


Oct 24, 2017.





Admission


Reason for Admission:  Shortness Of Breath





Discharge


Discharge Diagnosis / Problem:  ACUTE ON CHRONIC DIASTOLIC CHF





Discharge Goals


Goal(s):  Diagnostic testing, Therapeutic intervention





Activity Recommendations


Activity Level:  Assistance Required (ALWAYS WITH ASSISTANCE)


Therapies:  Physical Therapy, Occupational Therapy


FALL PRECAUTIONS PLEASE


.





Additional Information


Patient informed of condition:  Yes


Advance Directives:  No (UNKNOWN)


DNR:  No (PATIENT IS FULL CODE)


Level of Care:  Skilled


Communicable Disease:  No


Prognosis:  Improving


Oxygen at (LPM):  2 LITERS VIA NASAL CANNULA AS NEEDED, CPAP AT NIGHT





Instructions / Follow-Up


Instructions / Follow-Up


MONITOR VITAL SIGNS, VOLUME STATUS DAILY. 


MONITOR RENAL FUNCTION NOW THAT PATIENT IS ON LASIX TWICE A WEEK.


FALL PRECAUTIONS PLEASE.





FOLLOW UP WITH PRIMARY CARE PHYSICIAN DR. SUTTON ON TUESDAY OCTOBER 31, 2017 

AT 2:45PM.


FOLLOW UP WITH CARDIOLOGIST, NEUROLOGIST, NEPHROLOGIST, PSYCHIATRIST IN 2 WEEKS.








CPAP AT NIGHT AND WHILE SLEEPING:


Delivery Mode BiPap 


* CPAP


Respiratory Treatment Method


* Facial BiPAP Mask


Expiratory Airway Pressure


* 10 cm H2O


Leakage 


* 96 % (0-40) H


Oxygen Flow Rate


* 2.0 L/min


Duration Of Treatment 


* At Night


(EPAP) Patient 


* 10.0 cm H2O





PLEASE REFER TO ACCOMPANYING HOSPITAL DISCHARGE SUMMARY FOR FURTHER DETAILS.





Current Hospital Diet


Patient's current hospital diet: AHA Diet (Heart Healthy), Diabetes Type 2 Diet

, Renal Diet





Discharge Diet


Recommended Diet:  AHA Diet (Heart Healthy), Diabetes Type 2 Diet


Fluid Restriction:  2000 ml (8 cups)





Procedures


Procedures Performed:  


CT HEAD, CHEST CT, ECHOCARDIOGRAM





Pending Studies


Studies pending at discharge:  yes


List of pending studies:  


PLEASE REFER TO ACCOMPANYING HOSPITAL DISCHARGE SUMMARY.





Physician Orders On Transfer


Special Precautions:


MONITOR VITAL SIGNS, VOLUME STATUS DAILY. 


MONITOR RENAL FUNCTION NOW THAT PATIENT IS ON LASIX TWICE A WEEK.


FALL PRECAUTIONS PLEASE.





FOLLOW UP WITH PRIMARY CARE PHYSICIAN DR. SUTTON ON TUESDAY OCTOBER 31, 2017 

AT 2:45PM.


FOLLOW UP WITH CARDIOLOGIST, NEUROLOGIST, NEPHROLOGIST, PSYCHIATRIST IN 2 WEEKS.





CPAP AT NIGHT AND WHILE SLEEPING:


Delivery Mode BiPap 


* CPAP


Respiratory Treatment Method


* Facial BiPAP Mask


Expiratory Airway Pressure


* 10 cm H2O


Leakage 


* 96 % (0-40) H


Oxygen Flow Rate


* 2.0 L/min


Duration Of Treatment 


* At Night


(EPAP) Patient 


* 10.0 cm H2O





PLEASE REFER TO ACCOMPANYING HOSPITAL DISCHARGE SUMMARY FOR FURTHER DETAILS.

## 2017-10-24 NOTE — PHARMACY PROGRESS NOTE
Glycemic: Assessment & Plan


Date of Service


Oct 24, 2017.





Assessment & Plan


Outpatient Anti-diabetic Regimen: 


* Lantus 52 units SQ BID


* Novolog 29 units AC and correction factor of 1 unit for every 50 mg/dL over 

150 mg/dL


* A1c:  7%  (10/16/17)








ASSESSMENT:


* The patient is currently receiving ~50 units of insulin per day. 


* BSGs ranging 94 - 176 mg/dl over the past 48hrs. 


* Fasting BSG at goal, post-prandial BSGs are reasonable


* No changes required at this time








PLAN FOR INPATIENT GLYCEMIC CONTROL:





* Continue Lantus 18 units SQ BID





* Bolus insulin 


 * NovoLog per scale ACHS or Q6hrs while NPO


 * Goal Range:  Low 110 mg/dL - High 140 mg/dL


 * Correction Factor: 20 mg/dL/unit


 * Nutritional / Prandial insulin per carb ratio of 1 unit per 10 grams CHO 

consumed








RECOMMENDATIONS FOR DISCHARGE:


* Patient's HbA1C is within goal (goal for patient is 7.6-8.0% based upon 

Elements of Diabetes Care Scoring Scale). 


* As long as patient has no hypoglycemia at home, it is reasonable to continue 

current regimen.





Thank you.

## 2017-10-24 NOTE — PROGRESS NOTE
Medicine Progress Note


Date & Time of Visit:


Oct 24, 2017 at 12:12.


Subjective


seen resting in bed, comfortable


states he feels good 


denies dyspnea, cough, chest pain


shaking/tremors much better


denies other symptoms


states he is ready and would like to be discharged today





Objective





Last 8 Hrs








  Date Time  Temp Pulse Resp B/P (MAP) Pulse Ox O2 Delivery O2 Flow Rate FiO2


 


10/24/17 11:05 36.6 70 16 142/77 (98) 96 Room Air  


 


10/24/17 08:00      BiPAP  


 


10/24/17 07:20 36.9 60 14 129/76 (93) 95   








Physical Exam:


General- oriented x 3, not in distress, speaks in sentences with no effort





Neck- no JVD





Lungs- clear breath sounds bilaterally, no rales/wheezes





Heart- regular rhythm; no murmur, normal rate





Abdomen- normal bowel sounds, soft, nontender





Extremities- trace lower leg edema, no calf tenderness





Neuro- alert, oriented x 3;no gross focal deficits





Skin- warm & dry


Laboratory Results:





Last 24 Hours








Test


  10/23/17


16:02 10/23/17


20:24 10/24/17


07:13 10/24/17


11:32


 


Bedside Glucose 110 mg/dl  107 mg/dl  115 mg/dl  116 mg/dl 











Assessment & Plan


This is a 72 year old male with a PMH of CAD s/p CABGx2, Hx. of Cardiac Arrest 

and V-Fib s/p AICD placement, insulin dependent DM2 with complications 

including peripheral vascular disease s/p R foot transmetatarsal amputation, 

CKD stage IV, SABRA on nocturnal O2, Hypothyroidism, Mood Disorder with 

depression - presents with shortness of breath





ACUTE ON LIKELY CHRONIC DIASTOLIC CHF, Resolved


- echo noted


 Cardiology Service Dr. Gaona consulted


  Isordil added


  Lasix given, will need Lasix 20mg po 2x a week


  monitor BP and volume status closely


  ff up with Cardiologist Dr. Gaona in 2 weeks





- possible component of Acute Bronchitis


  complete Doxycycline x 7 days








TREMORS/SHAKES


- multifactorial


 Neurologist Dr. Wills and Psychiatrist Dr. Mark consulted





- from multiple psych meds


  Psych consulted, medication doses decreased


  Lyrica also tapered down


  monitor as outpatient





- from SABRA/ non adherence of CPAP


  encouraged CPAP use and patient agreed, has been adherent so far


  continue to encourage use of CPAP when sleeping





- seizure ruled out


  EEG Negative





- Cerebellar Atrophy ruled out


  cannot perform MRI as patient has pacemaker


  CT head w/o contrast (+) old infarcts





- improving daily


  continue PT and OT


  Fall Precautions


  ff up with  Neurologist Dr. Wills  and outpatient Psychiatrist in 1-2 weeks








DM 2


patient is on a high dose of insulin, takes 52 units twice daily of Lantus as 

well as a sliding scale of Novolog


a1c 7.0


Pharmacy Glycemic Control consulted


- continue current inpatient Insulin regimen:


   Lantus 18 units SQ BID


* Bolus insulin 


 * NovoLog per scale ACHS or Q6hrs while NPO


 * Goal Range:  Low 110 mg/dL - High 140 mg/dL


 * Correction Factor: 20 mg/dL/unit


 * Nutritional / Prandial insulin per carb ratio of 1 unit per 10 grams CHO 

consumed





HTN


elevated


Isordil added, increased to 10mg BID


- BP improving, monitor





CKD stage IV


- crea within baseline overall


- will need to monitor crea closely while on Lasix twice a wee


- Nephrology consulted


  ff up with Dr. Alcaraz in 1-2 weeks





Hx. of CAD s/p CABGx2


Hx. of Cardiac arrest and V-Fib s/p AICD


cardiac markers negative


echo noted


on Plavix, Aspirin





OBSTRUCTIVE SLEEP APNEA


uses 2L O2 nocturnally


nocturnal pulse ox as SOB is worse at night


-- discussed in detail with patient  re: importance of CPAP use and 

consequences of not using it


   he agreed to try CPAP and has been adherent since


   continue to encourage CPAP use





Hypothyroidism


continue current dose of Synthroid





Mood Disorder and Depression


Psych consulted for medication adjustments in light of tremors/shaking


Tofranil tapered off, Cymbalta and Wellbutrin decreased


continue ff up with outpatient Psychiatrist





DVT ppx


subq heparin given





FULL CODE





Disposition


transition to Rehab


ff up with Primary Care Physician in 1 week


ff up with Cardiologist Dr. Gaona, Neurologist Dr. Wills, Nephrologist Dr. Alcaraz in 2 weeks.


Current Inpatient Medications:





Current Inpatient Medications








 Medications


  (Trade)  Dose


 Ordered  Sig/Richard


 Route  Start Time


 Stop Time Status Last Admin


Dose Admin


 


 Heparin Sodium


  (Porcine)


  (Heparin Sq 5000


 Unit/0.5ml)  5,000 unit  Q8


 SQ  10/15/17 14:00


 11/14/17 13:59  10/24/17 05:29


5,000 UNIT


 


 Insulin Aspart


  (novoLOG ASPART)  **SLIDING


 SCALE**


 **If C...  ACHS


 SC  10/15/17 11:00


 11/14/17 10:59  10/24/17 08:26


4 UNITS


 


 Glucose


  (Glucose 40% Gel)  15-30


 GRAMS 15


 GRAMS...  UD  PRN


 PO  10/15/17 10:45


 11/14/17 10:44   


 


 


 Glucose


  (Glucose Chew


 Tab)  4-8


 Tablets 4


 Tabl...  UD  PRN


 PO  10/15/17 10:45


 11/14/17 10:44   


 


 


 Dextrose


  (Dextrose 50%


 50ML Syringe)  25-50ML OF


 50% DW IV


 FOR...  UD  PRN


 IV  10/15/17 10:45


 11/14/17 10:44   


 


 


 Glucagon


  (Glucagon Inj)  1 mg  UD  PRN


 SQ  10/15/17 10:45


 11/14/17 10:44   


 


 


 Amlodipine


 Besylate


  (Norvasc Tab)  10 mg  DAILY


 PO  10/16/17 09:00


 11/15/17 08:59  10/24/17 08:22


10 MG


 


 Clopidogrel


 Bisulfate


  (plAVix TAB)  75 mg  DAILY


 PO  10/16/17 09:00


 11/15/17 08:59  10/24/17 08:21


75 MG


 


 Duloxetine HCl


  (Cymbalta Cap)  30 mg  HS


 PO  10/15/17 21:00


 11/14/17 20:59  10/23/17 20:39


30 MG


 


 Duloxetine HCl


  (Cymbalta Cap)  60 mg  DAILY


 PO  10/16/17 09:00


 11/15/17 08:59  10/24/17 08:22


60 MG


 


 Levothyroxine


 Sodium


  (Synthroid Tab)  50 mcg  DAILYBB


 PO  10/16/17 06:30


 11/15/17 06:59  10/24/17 05:29


50 MCG


 


 Magnesium Oxide


  (Mag-Ox Tab)  400 mg  BID


 PO  10/15/17 21:00


 11/14/17 20:59  10/24/17 08:21


400 MG


 


 Metoprolol


 Succinate


  (Toprol Xl Tab)  37.5 mg  HS


 PO  10/15/17 21:00


 11/14/17 20:59  10/23/17 20:40


37.5 MG


 


 Multivitamins


  (Multivitamin


 Tab)  1 tab  DAILY


 PO  10/16/17 09:00


 11/15/17 08:59  10/24/17 08:23


1 TAB


 


 Pantoprazole


 Sodium


  (Protonix Tab)  40 mg  DAILY


 PO  10/16/17 09:00


 11/15/17 08:59  10/24/17 08:23


40 MG


 


 Rosuvastatin


 Calcium


  (Crestor Tab)  40 mg  DAILY


 PO  10/16/17 09:00


 11/15/17 08:59  10/24/17 08:23


40 MG


 


 Sodium Chloride


  (Ocean Nasal


 Spray)  1 sprays  PRN  PRN


 NA  10/15/17 16:45


 11/14/17 16:44   


 


 


 Promethazine HCl


 12.5 mg/Sodium


 Chloride  50.5 ml @ 


 204 mls/hr  Q6H  PRN


 IV  10/15/17 22:15


 11/14/17 22:14  10/15/17 22:29


204 MLS/HR


 


 Albuterol/


 Ipratropium


  (Combivent


 Respimat Inh)  1 puffs  QID


 INH  10/17/17 13:00


 11/16/17 12:59  10/24/17 08:24


1 PUFFS


 


 Miscellaneous


 Information


  (Consult


 Glycemic


 Management


 Pharmacy)  1 ea  UD  PRN


 N/A  10/17/17 09:45


 11/16/17 09:44   


 


 


 Aspirin


  (Ecotrin Tab)  81 mg  DAILY


 PO  10/18/17 09:00


 11/17/17 08:59  10/24/17 08:24


81 MG


 


 Fenofibrate


  (Tricor)  200 mg  QAM


 PO  10/19/17 09:00


 11/18/17 08:59  10/24/17 08:23


200 MG


 


 Doxycycline


 Hyclate


  (Vibramycin Cap)  100 mg  BID


 PO  10/19/17 10:00


 10/24/17 23:59  10/24/17 08:24


100 MG


 


 Insulin Glargine


  (Lantus Solostar


 Pen)  18 units  BID


 SC  10/20/17 21:00


 11/19/17 20:59  10/24/17 08:27


18 UNITS


 


 Isosorbide


 Dinitrate


  (Isordil Tab)  10 mg  BID


 PO  10/21/17 09:00


 11/19/17 15:59  10/24/17 08:21


10 MG


 


 Bupropion HCl


  (Wellbutrin-Sr


 Tab)  150 mg  BID17


 PO  10/21/17 17:00


 11/14/17 20:59  10/24/17 08:22


150 MG


 


 Imipramine HCl


  (Tofranil Tab)  25 mg  HS


 PO  10/21/17 21:00


 11/14/17 20:59  10/23/17 20:51


25 MG


 


 Pregabalin


  (Lyrica Cap)  100 mg  BID


 PO  10/21/17 21:00


 11/20/17 20:59  10/24/17 08:20


100 MG

## 2018-02-02 ENCOUNTER — HOSPITAL ENCOUNTER (INPATIENT)
Dept: HOSPITAL 45 - C.EDB | Age: 73
LOS: 3 days | Discharge: HOME HEALTH | DRG: 378 | End: 2018-02-05
Attending: HOSPITALIST | Admitting: FAMILY MEDICINE
Payer: COMMERCIAL

## 2018-02-02 VITALS
HEART RATE: 66 BPM | DIASTOLIC BLOOD PRESSURE: 74 MMHG | TEMPERATURE: 97.52 F | SYSTOLIC BLOOD PRESSURE: 147 MMHG | OXYGEN SATURATION: 93 %

## 2018-02-02 VITALS
OXYGEN SATURATION: 90 % | DIASTOLIC BLOOD PRESSURE: 79 MMHG | HEART RATE: 78 BPM | TEMPERATURE: 97.88 F | SYSTOLIC BLOOD PRESSURE: 163 MMHG

## 2018-02-02 VITALS
SYSTOLIC BLOOD PRESSURE: 168 MMHG | HEART RATE: 63 BPM | OXYGEN SATURATION: 95 % | DIASTOLIC BLOOD PRESSURE: 84 MMHG | TEMPERATURE: 97.88 F

## 2018-02-02 VITALS
HEIGHT: 71 IN | WEIGHT: 248.46 LBS | BODY MASS INDEX: 34.78 KG/M2 | WEIGHT: 248.46 LBS | BODY MASS INDEX: 34.78 KG/M2 | HEIGHT: 71 IN

## 2018-02-02 VITALS — OXYGEN SATURATION: 96 %

## 2018-02-02 VITALS — TEMPERATURE: 97.88 F | SYSTOLIC BLOOD PRESSURE: 166 MMHG | HEART RATE: 75 BPM | DIASTOLIC BLOOD PRESSURE: 77 MMHG

## 2018-02-02 VITALS
DIASTOLIC BLOOD PRESSURE: 83 MMHG | TEMPERATURE: 97.88 F | SYSTOLIC BLOOD PRESSURE: 161 MMHG | HEART RATE: 75 BPM | OXYGEN SATURATION: 93 %

## 2018-02-02 VITALS
SYSTOLIC BLOOD PRESSURE: 167 MMHG | OXYGEN SATURATION: 95 % | TEMPERATURE: 98.96 F | DIASTOLIC BLOOD PRESSURE: 80 MMHG | HEART RATE: 78 BPM

## 2018-02-02 VITALS — SYSTOLIC BLOOD PRESSURE: 117 MMHG | DIASTOLIC BLOOD PRESSURE: 78 MMHG | OXYGEN SATURATION: 93 % | HEART RATE: 82 BPM

## 2018-02-02 VITALS
OXYGEN SATURATION: 95 % | DIASTOLIC BLOOD PRESSURE: 79 MMHG | HEART RATE: 77 BPM | TEMPERATURE: 98.96 F | SYSTOLIC BLOOD PRESSURE: 170 MMHG

## 2018-02-02 VITALS
DIASTOLIC BLOOD PRESSURE: 83 MMHG | SYSTOLIC BLOOD PRESSURE: 161 MMHG | TEMPERATURE: 97.88 F | OXYGEN SATURATION: 96 % | HEART RATE: 68 BPM

## 2018-02-02 VITALS
OXYGEN SATURATION: 96 % | DIASTOLIC BLOOD PRESSURE: 97 MMHG | SYSTOLIC BLOOD PRESSURE: 166 MMHG | HEART RATE: 82 BPM | TEMPERATURE: 97.88 F

## 2018-02-02 VITALS — OXYGEN SATURATION: 92 % | DIASTOLIC BLOOD PRESSURE: 74 MMHG | HEART RATE: 75 BPM | SYSTOLIC BLOOD PRESSURE: 146 MMHG

## 2018-02-02 VITALS
OXYGEN SATURATION: 93 % | TEMPERATURE: 97.7 F | DIASTOLIC BLOOD PRESSURE: 69 MMHG | SYSTOLIC BLOOD PRESSURE: 157 MMHG | HEART RATE: 72 BPM

## 2018-02-02 VITALS
OXYGEN SATURATION: 96 % | DIASTOLIC BLOOD PRESSURE: 87 MMHG | TEMPERATURE: 98.6 F | HEART RATE: 69 BPM | SYSTOLIC BLOOD PRESSURE: 134 MMHG

## 2018-02-02 DIAGNOSIS — I50.30: ICD-10-CM

## 2018-02-02 DIAGNOSIS — K57.30: ICD-10-CM

## 2018-02-02 DIAGNOSIS — F17.200: ICD-10-CM

## 2018-02-02 DIAGNOSIS — I65.29: ICD-10-CM

## 2018-02-02 DIAGNOSIS — I48.0: ICD-10-CM

## 2018-02-02 DIAGNOSIS — K57.33: Primary | ICD-10-CM

## 2018-02-02 DIAGNOSIS — I13.0: ICD-10-CM

## 2018-02-02 DIAGNOSIS — Z95.810: ICD-10-CM

## 2018-02-02 DIAGNOSIS — Z95.1: ICD-10-CM

## 2018-02-02 DIAGNOSIS — D62: ICD-10-CM

## 2018-02-02 DIAGNOSIS — E11.21: ICD-10-CM

## 2018-02-02 DIAGNOSIS — Z86.74: ICD-10-CM

## 2018-02-02 DIAGNOSIS — K21.9: ICD-10-CM

## 2018-02-02 DIAGNOSIS — E03.9: ICD-10-CM

## 2018-02-02 DIAGNOSIS — I25.10: ICD-10-CM

## 2018-02-02 DIAGNOSIS — Z88.1: ICD-10-CM

## 2018-02-02 DIAGNOSIS — N18.4: ICD-10-CM

## 2018-02-02 DIAGNOSIS — I25.2: ICD-10-CM

## 2018-02-02 LAB
ALBUMIN SERPL-MCNC: 2.8 GM/DL (ref 3.4–5)
ALP SERPL-CCNC: 35 U/L (ref 45–117)
ALT SERPL-CCNC: 21 U/L (ref 12–78)
AST SERPL-CCNC: 14 U/L (ref 15–37)
BASOPHILS # BLD: 0.07 K/UL (ref 0–0.2)
BASOPHILS NFR BLD: 0.8 %
BUN SERPL-MCNC: 32 MG/DL (ref 7–18)
CALCIUM SERPL-MCNC: 7.7 MG/DL (ref 8.5–10.1)
CO2 SERPL-SCNC: 28 MMOL/L (ref 21–32)
CREAT SERPL-MCNC: 2.48 MG/DL (ref 0.6–1.4)
EOS ABS #: 0.18 K/UL (ref 0–0.5)
EOSINOPHIL NFR BLD AUTO: 246 K/UL (ref 130–400)
GLUCOSE SERPL-MCNC: 184 MG/DL (ref 70–99)
HCT VFR BLD CALC: 24.8 % (ref 42–52)
HCT VFR BLD CALC: 26.2 % (ref 42–52)
HGB BLD-MCNC: 8.2 G/DL (ref 14–18)
HGB BLD-MCNC: 8.4 G/DL (ref 14–18)
IG#: 0.11 K/UL (ref 0–0.02)
IMM GRANULOCYTES NFR BLD AUTO: 26 %
INR PPP: 1.1 (ref 0.9–1.1)
LIPASE: 117 U/L (ref 73–393)
LYMPHOCYTES # BLD: 2.18 K/UL (ref 1.2–3.4)
MCH RBC QN AUTO: 27.8 PG (ref 25–34)
MCHC RBC AUTO-ENTMCNC: 32.1 G/DL (ref 32–36)
MCV RBC AUTO: 86.8 FL (ref 80–100)
MONO ABS #: 0.55 K/UL (ref 0.11–0.59)
MONOCYTES NFR BLD: 6.6 %
NEUT ABS #: 5.29 K/UL (ref 1.4–6.5)
NEUTROPHILS # BLD AUTO: 2.1 %
NEUTROPHILS NFR BLD AUTO: 63.2 %
PMV BLD AUTO: 10.5 FL (ref 7.4–10.4)
POTASSIUM SERPL-SCNC: 3.7 MMOL/L (ref 3.5–5.1)
PROT SERPL-MCNC: 6 GM/DL (ref 6.4–8.2)
PTT PATIENT: 27 SECONDS (ref 21–31)
RED CELL DISTRIBUTION WIDTH CV: 14.9 % (ref 11.5–14.5)
RED CELL DISTRIBUTION WIDTH SD: 46.8 FL (ref 36.4–46.3)
SODIUM SERPL-SCNC: 139 MMOL/L (ref 136–145)
WBC # BLD AUTO: 8.38 K/UL (ref 4.8–10.8)

## 2018-02-02 RX ADMIN — METOPROLOL SUCCINATE SCH MG: 25 TABLET, EXTENDED RELEASE ORAL at 20:30

## 2018-02-02 RX ADMIN — INSULIN ASPART SCH UNITS: 100 INJECTION, SOLUTION INTRAVENOUS; SUBCUTANEOUS at 20:37

## 2018-02-02 RX ADMIN — Medication SCH MG: at 20:30

## 2018-02-02 RX ADMIN — INSULIN GLARGINE SCH UNITS: 100 INJECTION, SOLUTION SUBCUTANEOUS at 20:37

## 2018-02-02 RX ADMIN — PREGABALIN SCH MG: 100 CAPSULE ORAL at 20:33

## 2018-02-02 RX ADMIN — DULOXETINE SCH MG: 30 CAPSULE, DELAYED RELEASE PELLETS ORAL at 20:29

## 2018-02-02 RX ADMIN — AMPICILLIN SODIUM AND SULBACTAM SODIUM SCH MLS/HR: 2; 1 INJECTION, POWDER, FOR SOLUTION INTRAMUSCULAR; INTRAVENOUS at 20:33

## 2018-02-02 NOTE — HISTORY AND PHYSICAL
History & Physical


Date & Time of Service:


Feb 2, 2018 ~ 16:30


Chief Complaint:


Rectal Bleeding, Nausea


Primary Care Physician:


Javed Mccormick M.D.


History of Present Illness


72 year old male who presents to the ED with rectal bleeding and nausea.  

Patient reports the rectal bleeding started yesterday morning. He reports 

multiple episodes of bright red bleeding per rectum. There have been clots at 

times too. Blood has continued to ooze since yesterday morning. He denies any 

pain. Today he had nausea but denies vomiting. He reports feeling lightheaded 

and dizzy but no syncopal events. He has chronic exertional shortness of breath 

which is unchanged from baseline. No chest pain. He denies fever and chills. He 

has chronic urinary hesitancy. In the ED, patient's hgb is found to be 8.4. 

Vitals are stable. CT ordered by myself shows an early descending colon 

diverticulitis. He was given IVF in the ED.





Past Medical/Surgical History


Medical Problems:


(1) CAD (coronary artery disease)


Permanent Comment: 1986 - MI s/p PTCA


1989 - MI


2007 - CABG x 2


Status: Chronic  





(2) Cardiac arrest


Status: Chronic  





(3) Carotid stenosis


Status: Chronic  





(4) CKD (chronic kidney disease), stage IV


Status: Chronic  





(5) Diabetic nephropathy


Status: Chronic  





(6) Diastolic CHF


Status: Chronic  





(7) DM type 2 (diabetes mellitus, type 2)


Status: Chronic  





(8) Dyslipidemia


Status: Chronic  





(9) GERD (gastroesophageal reflux disease)


Status: Chronic  





(10) HTN (hypertension)


Status: Chronic  





(11) Hypothyroidism


Status: Chronic  





(12) PVD (peripheral vascular disease)


Status: Chronic  





Surgical Problems:


(1) AICD (automatic cardioverter/defibrillator) present


Status: Chronic  





(2) History of inguinal hernia repair


Status: Chronic  





(3) Hx of CABG


Status: Resolved  





(4) Hx of cholecystectomy


Status: Chronic  





(5) S/P femoral-popliteal bypass surgery


Status: Chronic  








Family History





FH: CAD (coronary artery disease)


  FATHER


Stroke


  MOTHER





Social History


Smoking Status:  Never Smoker


Alcohol Use:  none





Immunizations


History of Influenza Vaccine:  Yes


Influenza Vaccine Date:  Sep 18, 2017


History of Tetanus Vaccine?:  Yes


Tetanus Immunization Date:  Jun 12, 2008


History of Pneumococcal:  Yes


Pneumococcal Date:  Mar 30, 2015


History of Hepatitis B Vaccine:  No





Multi-Drug Resistant Organisms


History of MDRO:  No





Allergies


Coded Allergies:  


     Moxifloxacin (Unverified  Adverse Reaction, Severe, "DEPLETED POTASSIUM 

AND MAGNESIUM. CARDIAC ARREST" PT WIFE, 2/2/18)





Home Medications


Scheduled


Allopurinol (Zyloprim), 300 MG PO DAILY


Amlodipine (Norvasc), 10 MG PO DAILY


Aspirin (Aspirin Ec), 81 MG PO DAILY


Bupropion HCl (Bupropion HCl Sr), 150 MG PO BID17


Cholecalciferol (Vitamin D3), 2 TAB PO DAILY


Clopidogrel (Plavix), 75 MG PO DAILY


Duloxetine HCl (Cymbalta), 1 CAP PO HS


Duloxetine Hcl (Cymbalta), 60 MG PO DAILY


Fenofibrate (Tricor), 200 MG PO DAILY


Furosemide (Lasix), 20 MG PO Q2D


Insulin Aspart (Novolog Flexpen), 0 UNITS SC ACHS


Insulin Glargine (Lantus Solostar), 50 UNITS SC BID


Isosorbide Dinitrate (Isosorbide Dinitrate), 10 MG PO BID


Levothyroxine Sodium (Levothyroxine Sodium), 1 TAB PO DAILYBB


Magnesium Oxide (Mag-Ox), 400 MG PO BID


Metoprolol Succ (Toprol Xl) (Toprol-Xl), 1.5 TAB PO HS


Multivitamin (Multivitamin), 1 TAB PO DAILY


Nitroglycerin (Nitrostat), 0.4 MG UT PRN


Omega-3 Fatty Acids (Fish Oil 1200 mg), 2 CAP PO BID


Pantoprazole (Protonix), 40 MG PO DAILY


Pregabalin (Lyrica), 100 MG PO BID


Rosuvastatin Calcium (Crestor), 40 MG PO DAILY





Scheduled PRN


Home O2 Therapy (Oxygen), 2 LITERS NA DAILY PRN for shortness of breath/hypoxia


Ipratropium-Albuterol (Combivent Respimat), 1 PUFFS INH QID PRN for SOB/Wheezing





Review of Systems


ROS per HPI, all other systems reviewed and negative





Physical Exam


Vital Signs











  Date Time  Temp Pulse Resp B/P (MAP) Pulse Ox O2 Delivery O2 Flow Rate FiO2


 


2/2/18 17:26  68 18 114/68 92 Room Air  


 


2/2/18 16:16  66 20 178/62 91 Room Air  


 


2/2/18 15:48     95 Room Air  


 


2/2/18 15:18  61      


 


2/2/18 15:10 36.4 62 20 148/67 96 Room Air  








General Appearance:  WD/WN, no apparent distress


Head:  normocephalic, atraumatic


Eyes:  normal inspection, EOMI, sclerae normal


ENT:  hearing grossly normal, + pertinent finding (mucous membranes moist)


Neck:  supple, no JVD, trachea midline


Respiratory/Chest:  lungs clear, normal breath sounds, no respiratory distress


Cardiovascular:  regular rate, rhythm, no edema, normal peripheral pulses


Abdomen/GI:  normal bowel sounds, soft, no organomegaly, + tenderness (mild, LLQ

)


Extremities/Musculoskelatal:  normal inspection, no calf tenderness, normal 

capillary refill, + pertinent finding (s/p toe amputations on right foot)


Neurologic/Psych:  no motor/sensory deficits, alert, normal mood/affect, 

oriented x 3


Skin:  normal color, warm/dry, + pertinent finding (open callous noted to left 

gret toe - no drainage or surrounding erythema)





Diagnostics


Laboratory Results





Results Past 24 Hours








Test


  2/2/18


15:45 2/2/18


15:46 2/2/18


16:50 2/2/18


17:50 Range/Units


 


 


White Blood Count 8.38    4.8-10.8  K/uL


 


Red Blood Count 3.02    4.7-6.1  M/uL


 


Hemoglobin 8.4   8.2 14.0-18.0  g/dL


 


Hematocrit 26.2   24.8 42-52  %


 


Mean Corpuscular Volume 86.8      fL


 


Mean Corpuscular Hemoglobin 27.8    25-34  pg


 


Mean Corpuscular Hemoglobin


Concent 32.1


  


  


  


  32-36  g/dl


 


 


Platelet Count 246    130-400  K/uL


 


Mean Platelet Volume 10.5    7.4-10.4  fL


 


Neutrophils (%) (Auto) 63.2     %


 


Lymphocytes (%) (Auto) 26.0     %


 


Monocytes (%) (Auto) 6.6     %


 


Eosinophils (%) (Auto) 2.1     %


 


Basophils (%) (Auto) 0.8     %


 


Neutrophils # (Auto) 5.29    1.4-6.5  K/uL


 


Lymphocytes # (Auto) 2.18    1.2-3.4  K/uL


 


Monocytes # (Auto) 0.55    0.11-0.59  K/uL


 


Eosinophils # (Auto) 0.18    0-0.5  K/uL


 


Basophils # (Auto) 0.07    0-0.2  K/uL


 


RDW Standard Deviation 46.8    36.4-46.3  fL


 


RDW Coefficient of Variation 14.9    11.5-14.5  %


 


Immature Granulocyte % (Auto) 1.3     %


 


Immature Granulocyte # (Auto) 0.11    0.00-0.02  K/uL


 


Anisocytosis PRESENT     


 


Prothrombin Time


  


  11.4


  


  


  9.0-12.0


SECONDS


 


Prothromb Time International


Ratio 


  1.1


  


  


  0.9-1.1  


 


 


Activated Partial


Thromboplast Time 


  27.0


  


  


  21.0-31.0


SECONDS


 


Partial Thromboplastin Ratio  1.0    


 


Sodium Level  139   136-145  mmol/L


 


Potassium Level  3.7   3.5-5.1  mmol/L


 


Chloride Level  105     mmol/L


 


Carbon Dioxide Level  28   21-32  mmol/L


 


Anion Gap  6.0   3-11  mmol/L


 


Blood Urea Nitrogen  32   7-18  mg/dl


 


Creatinine


  


  2.48


  


  


  0.60-1.40


mg/dl


 


Est Creatinine Clear Calc


Drug Dose 


  34.5


  


  


   ml/min


 


 


Estimated GFR (


American) 


  28.9


  


  


   


 


 


Estimated GFR (Non-


American 


  25.0


  


  


   


 


 


BUN/Creatinine Ratio  13.0   10-20  


 


Random Glucose  184   70-99  mg/dl


 


Calcium Level  7.7   8.5-10.1  mg/dl


 


Total Bilirubin  0.4   0.2-1  mg/dl


 


Direct Bilirubin  0.2   0-0.2  mg/dl


 


Aspartate Amino Transf


(AST/SGOT) 


  14


  


  


  15-37  U/L


 


 


Alanine Aminotransferase


(ALT/SGPT) 


  21


  


  


  12-78  U/L


 


 


Alkaline Phosphatase  35     U/L


 


Troponin I  < 0.015   0-0.045  ng/ml


 


Total Protein  6.0   6.4-8.2  gm/dl


 


Albumin  2.8   3.4-5.0  gm/dl


 


Lipase  117     U/L


 


Urine Color   YELLOW   


 


Urine Appearance   CLOUDY  CLEAR  


 


Urine pH   6.5  4.5-7.5  


 


Urine Specific Gravity   1.021  1.000-1.030  


 


Urine Protein   2+  NEG  


 


Urine Glucose (UA)   2+  NEG  


 


Urine Ketones   NEG  NEG  


 


Urine Occult Blood   NEG  NEG  


 


Urine Nitrite   NEG  NEG  


 


Urine Bilirubin   NEG  NEG  


 


Urine Urobilinogen   NEG  NEG  


 


Urine Leukocyte Esterase   NEG  NEG  


 


Urine WBC (Auto)   5-10  0-5  /hpf


 


Urine RBC (Auto)   0-4  0-4  /hpf


 


Urine Hyaline Casts (Auto)   1-5  0-5  /lpf


 


Urine Epithelial Cells (Auto)   >30  0-5  /lpf


 


Urine Bacteria (Auto)   NEG  NEG  











Diagnostic Radiology


CT ABD/PELVIS IMPRESSION:


1.  Diverticulosis of the descending and sigmoid colon. Minimal pericolonic fat


infiltration along the descending colon suggests early acute on, located


diverticulitis. No CT evidence of perforation or abscess.


2.  Chronic diverticular disease of the sigmoid colon.


3.  Mild osteopenia suggested.


4.  Subcentimeter fatty lesion at the right hepatic dome may represent an


angiomyolipoma or lipoma.





Impression


Assessment and Plan


ACUTE BLOOD LOSS ANEMIA


LOWER GI BLEED


DIVERTICULITIS


- admit to tele


- patient presenting with persistent bright red bleeding per rectum that 

started yesterday morning; in the ED, found to have hgb 8.4, vitals stable


- given significant cardiac history and that patient continues to bleed, will 

transfuse with 2 units PRBC


- serial H/Hs


- suspect diverticular bleed; patient also on dual antiplatelet therapy due to 

CAD (ASA/Plavix which will be held)


- c-scope 1/2012 - diverticulosis throughout entire colon 


- CT scan showing early diverticulitis in the descending colon


- will start Unasyn


- check stool studies


- GI consult, Dr. Elliott notified





CAD


HX CARDIAC ARREST S/P AICD


- stable, no reports of chest pain 


- holding ASA and Plavix as above - no recent cardiac interventions


- continue beta blocker, nitrate, and statin 





HTN


- BP controlled, continue amlodipine, isosorbide, and metoprolol 





DIASTOLIC CHF


- appears euvolemic


- will hold Lasix due to volume depletion from GI bleed 





DM 


- hgb a1c 7.0 10/2017


- Lantus + SSI





HYPOTHYROIDISM


- continue levothyroxine





DVT PROPHYLAXIS


- SCDs due to GI bleed





DISPO


- In my clinical judgment this beneficiary meets acute admission criteria, 

established by CMS, that includes being hospitalized through two midnights.





ADDENDUM:





This is a 72 year old male with a PMH of CAD s/p CABGx2, Hx. of Cardiac Arrest 

and V-Fib s/p AICD placement, insulin dependent DM2 with complications 

including peripheral vascular disease s/p R foot transmetatarsal amputation, 

CKD stage IV, SABRA on nocturnal O2, Hypothyroidism, Mood Disorder with 

depression - presents with bright red blood per rectum.


Multiple episodes of liquid, bright red blood.


Denies feeling ill otherwise.


Hgb on admission = 8.4.





Plan:


transfuse two units pRBCs


H/H q6


GI consultation


will start Unasyn for mild acute diverticulitis


hold aspirin, Plavix, fish oils





VTE Prophylaxis


VTE Risk Assessment Done? Y/N:  Yes


Risk Level:  Moderate

## 2018-02-02 NOTE — EMERGENCY ROOM VISIT NOTE
History


Report prepared by Theresa:  Don George


Under the Supervision of:  Dr. Gilles Medina M.D.


First contact with patient:  15:05


Chief Complaint:  RECTAL BLEEDING


Stated Complaint:  RECTAL BLEEDING, WEAK, NAUSEA





History of Present Illness


The patient is a 72 year old male who presents to the Emergency Room with 

complaints of rectal bleeding that began yesterday. He has a past medical 

history of diabetes, MI's, and a CABG with a defibrillator in place. This has 

never happened to the patient before. Yesterday when the patient had a bowel 

movement, he noticed that he passed a moderate amount of bright red blood with 

some clots. This then began to happen intermittently "whenever it decided to 

happen." He is currently feeling dizzy and lightheaded but states that it is 

better when he lies down. The patient's wife notes that he seems to be paler 

than usual. Pt denies LOC, headache, fevers, chills, diaphoresis, visual changes

, neck pain, chest pain, breathing difficulties, nausea, vomiting, abdominal 

pain, back pain, melena, urinary symptoms, numbness, weakness, lymphadenopathy, 

rash, or other complaints. He is currently taking a baby Aspirin and Plavix.





   Source of History:  patient


   Onset:  yesterday


   Position:  other (Rectum)


   Symptom Intensity:  moderate


   Quality:  other (Bleeding)


   Timing:  intermittent


Note:


He states that he feels dizzy and lightheaded.





Review of Systems


See HPI for pertinent positives and negatives.  A total of ten systems were 

reviewed and were otherwise negative.





Past Medical & Surgical


Medical Problems:


(1) CAD (coronary artery disease)


(2) Cardiac arrest


(3) Carotid stenosis


(4) CKD (chronic kidney disease), stage IV


(5) Diabetic nephropathy


(6) Diastolic CHF


(7) DM type 2 (diabetes mellitus, type 2)


(8) Dyslipidemia


(9) GERD (gastroesophageal reflux disease)


(10) HTN (hypertension)


(11) Hypothyroidism


(12) PVD (peripheral vascular disease)


Surgical Problems:


(1) AICD (automatic cardioverter/defibrillator) present


(2) History of inguinal hernia repair


(3) Hx of CABG


(4) Hx of cholecystectomy


(5) S/P femoral-popliteal bypass surgery








Family History


Omitted secondary to the patient's age.





Social History


Smoking Status:  Current Every Day Smoker


Drug Use:  none


Marital Status:  


Housing Status:  lives with significant other





Current/Historical Medications


Scheduled


Allopurinol (Zyloprim), 300 MG PO DAILY


Amlodipine (Norvasc), 10 MG PO DAILY


Aspirin (Aspirin Ec), 81 MG PO DAILY


Bupropion HCl (Bupropion HCl Sr), 150 MG PO BID17


Cholecalciferol (Vitamin D3), 2 TAB PO DAILY


Clopidogrel (Plavix), 75 MG PO DAILY


Duloxetine HCl (Cymbalta), 1 CAP PO HS


Duloxetine Hcl (Cymbalta), 60 MG PO DAILY


Fenofibrate (Tricor), 200 MG PO DAILY


Furosemide (Lasix), 20 MG PO Q2D


Insulin Aspart (Novolog Flexpen), 0 UNITS SC ACHS


Insulin Glargine (Lantus Solostar), 50 UNITS SC BID


Isosorbide Dinitrate (Isosorbide Dinitrate), 10 MG PO BID


Levothyroxine Sodium (Levothyroxine Sodium), 1 TAB PO DAILYBB


Magnesium Oxide (Mag-Ox), 400 MG PO BID


Metoprolol Succ (Toprol Xl) (Toprol-Xl), 1.5 TAB PO HS


Multivitamin (Multivitamin), 1 TAB PO DAILY


Nitroglycerin (Nitrostat), 0.4 MG UT PRN


Omega-3 Fatty Acids (Fish Oil 1200 mg), 2 CAP PO BID


Pantoprazole (Protonix), 40 MG PO DAILY


Pregabalin (Lyrica), 100 MG PO BID


Rosuvastatin Calcium (Crestor), 40 MG PO DAILY





Scheduled PRN


Home O2 Therapy (Oxygen), 2 LITERS NA DAILY PRN for shortness of breath/hypoxia


Ipratropium-Albuterol (Combivent Respimat), 1 PUFFS INH QID PRN for SOB/Wheezing





Allergies


Coded Allergies:  


     Moxifloxacin (Unverified  Adverse Reaction, Severe, "DEPLETED POTASSIUM 

AND MAGNESIUM. CARDIAC ARREST" PT WIFE, 2/2/18)





Physical Exam


Vital Signs











  Date Time  Temp Pulse Resp B/P (MAP) Pulse Ox O2 Delivery O2 Flow Rate FiO2


 


2/2/18 16:16  66 20 178/62 91 Room Air  


 


2/2/18 15:48     95 Room Air  


 


2/2/18 15:18  61      


 


2/2/18 15:10 36.4 62 20 148/67 96 Room Air  











Physical Exam


GENERAL: Awake, alert, very pale appearing, in no distress


HENT: Normocephalic, atraumatic. Oropharynx unremarkable.


EYES: Pale conjunctiva. Sclera non-icteric.


NECK: Supple. No nuchal rigidity. FROM. No JVD.


RESPIRATORY: Clear to auscultation.


CARDIAC: Regular rate, normal rhythm. Extremities warm and well perfused. 

Pulses equal.


ABDOMEN: Soft, non-distended. No tenderness to palpation. No rebound or 

guarding. No masses.


RECTAL: Gross blood present. Minimal hemorrhoidal disease. Hemoccult positive.  


MUSCULOSKELETAL: Chest examination reveals no tenderness. The back is 

symmetrical on inspection without obvious abnormality. There is no CVA 

tenderness to palpation. No joint edema. 


LOWER EXTREMITIES: Calves are equal size bilaterally and non-tender. No edema. 

No discoloration. 


NEURO: Normal sensorium. No sensory or motor deficits noted. 


SKIN: No rash or jaundice noted.





Medical Decision & Procedures


Laboratory Results


2/2/18 15:45








Red Blood Count 3.02, Mean Corpuscular Volume 86.8, Mean Corpuscular Hemoglobin 

27.8, Mean Corpuscular Hemoglobin Concent 32.1, Mean Platelet Volume 10.5, 

Neutrophils (%) (Auto) 63.2, Lymphocytes (%) (Auto) 26.0, Monocytes (%) (Auto) 

6.6, Eosinophils (%) (Auto) 2.1, Basophils (%) (Auto) 0.8, Neutrophils # (Auto) 

5.29, Lymphocytes # (Auto) 2.18, Monocytes # (Auto) 0.55, Eosinophils # (Auto) 

0.18, Basophils # (Auto) 0.07





2/2/18 15:46

















Test


  2/2/18


15:45 2/2/18


15:46 2/2/18


16:50


 


White Blood Count


  8.38 K/uL


(4.8-10.8) 


  


 


 


Red Blood Count


  3.02 M/uL


(4.7-6.1) 


  


 


 


Hemoglobin


  8.4 g/dL


(14.0-18.0) 


  


 


 


Hematocrit 26.2 % (42-52)   


 


Mean Corpuscular Volume


  86.8 fL


() 


  


 


 


Mean Corpuscular Hemoglobin


  27.8 pg


(25-34) 


  


 


 


Mean Corpuscular Hemoglobin


Concent 32.1 g/dl


(32-36) 


  


 


 


Platelet Count


  246 K/uL


(130-400) 


  


 


 


Mean Platelet Volume


  10.5 fL


(7.4-10.4) 


  


 


 


Neutrophils (%) (Auto) 63.2 %   


 


Lymphocytes (%) (Auto) 26.0 %   


 


Monocytes (%) (Auto) 6.6 %   


 


Eosinophils (%) (Auto) 2.1 %   


 


Basophils (%) (Auto) 0.8 %   


 


Neutrophils # (Auto)


  5.29 K/uL


(1.4-6.5) 


  


 


 


Lymphocytes # (Auto)


  2.18 K/uL


(1.2-3.4) 


  


 


 


Monocytes # (Auto)


  0.55 K/uL


(0.11-0.59) 


  


 


 


Eosinophils # (Auto)


  0.18 K/uL


(0-0.5) 


  


 


 


Basophils # (Auto)


  0.07 K/uL


(0-0.2) 


  


 


 


RDW Standard Deviation


  46.8 fL


(36.4-46.3) 


  


 


 


RDW Coefficient of Variation


  14.9 %


(11.5-14.5) 


  


 


 


Immature Granulocyte % (Auto) 1.3 %   


 


Immature Granulocyte # (Auto)


  0.11 K/uL


(0.00-0.02) 


  


 


 


Anisocytosis PRESENT   


 


Prothrombin Time


  


  11.4 SECONDS


(9.0-12.0) 


 


 


Prothromb Time International


Ratio 


  1.1 (0.9-1.1) 


  


 


 


Activated Partial


Thromboplast Time 


  27.0 SECONDS


(21.0-31.0) 


 


 


Partial Thromboplastin Ratio  1.0  


 


Anion Gap


  


  6.0 mmol/L


(3-11) 


 


 


Est Creatinine Clear Calc


Drug Dose 


  34.5 ml/min 


  


 


 


Estimated GFR (


American) 


  28.9 


  


 


 


Estimated GFR (Non-


American 


  25.0 


  


 


 


BUN/Creatinine Ratio  13.0 (10-20)  


 


Calcium Level


  


  7.7 mg/dl


(8.5-10.1) 


 


 


Total Bilirubin


  


  0.4 mg/dl


(0.2-1) 


 


 


Direct Bilirubin


  


  0.2 mg/dl


(0-0.2) 


 


 


Aspartate Amino Transf


(AST/SGOT) 


  14 U/L (15-37) 


  


 


 


Alanine Aminotransferase


(ALT/SGPT) 


  21 U/L (12-78) 


  


 


 


Alkaline Phosphatase


  


  35 U/L


() 


 


 


Troponin I


  


  < 0.015 ng/ml


(0-0.045) 


 


 


Total Protein


  


  6.0 gm/dl


(6.4-8.2) 


 


 


Albumin


  


  2.8 gm/dl


(3.4-5.0) 


 


 


Lipase


  


  117 U/L


() 


 


 


Urine Color   YELLOW 


 


Urine Appearance   CLOUDY (CLEAR) 


 


Urine pH   6.5 (4.5-7.5) 


 


Urine Specific Gravity


  


  


  1.021


(1.000-1.030)


 


Urine Protein   2+ (NEG) 


 


Urine Glucose (UA)   2+ (NEG) 


 


Urine Ketones   NEG (NEG) 


 


Urine Occult Blood   NEG (NEG) 


 


Urine Nitrite   NEG (NEG) 


 


Urine Bilirubin   NEG (NEG) 


 


Urine Urobilinogen   NEG (NEG) 


 


Urine Leukocyte Esterase   NEG (NEG) 


 


Urine WBC (Auto)


  


  


  5-10 /hpf


(0-5)


 


Urine RBC (Auto)   0-4 /hpf (0-4) 


 


Urine Hyaline Casts (Auto)   1-5 /lpf (0-5) 


 


Urine Epithelial Cells (Auto)   >30 /lpf (0-5) 


 


Urine Bacteria (Auto)   NEG (NEG) 





Laboratory results reviewed by me





Medications Administered











 Medications


  (Trade)  Dose


 Ordered  Sig/Richard


 Route  Start Time


 Stop Time Status Last Admin


Dose Admin


 


 Sodium Chloride  1,000 ml @ 


 125 mls/hr  Q8H STAT


 IV  2/2/18 15:39


 2/2/18 19:41 DC 2/2/18 15:58


125 MLS/HR











ECG


Indication:  other (Bleeding)


Rate (beats per minute):  60


Rhythm:  sinus rhythm


Findings:  1st degree AV block, other (Lateral/nonspecific t-wave change)


Comparison ECG Date:  17 Oct 2017


Change:  no significant change


Change:


Patient's electrocardiogram was interpreted by me.





ED Course


1505: The patient was evaluated in room B3. A complete history and physical 

exam was performed.





1539: Ordered Sodium Chloride 1000 ml @ 125 mls/hr IV





1634: Upon reexamination, the patient was resting. I discussed the test results 

and treatment plan with him. I discussed the patient's case with Tahmina COTA of the [facility]. The patient will be evaluated for 

further management.





1636: I reevaluated the patient at this time, he is doing well. He consented 

for a blood transfusion.





Medical Decision


Prior records/ancillary studies reviewed.





Triage Nursing notes reviewed and agree them.





Additional history obtained from the family.





The patient's history was concerning for possible gastrointestinal bleeding.  





Differential diagnosis:


Etiologies such as  diverticulosis, AVM,  coagulopathy, colitis, inflammatory 

bowel disease, malignancy,Niurka-Acosta tear, esophagitis, peptic ulcer disease

, variceal bleed, gastritis, epistaxis, fissure, hemorrhoids, as well as others 

were entertained.  





Physical exam:


As above.  The patient was very pale.





ER treatment provided:


Saline hydration.


Packed red blood cell transfusion


On reassessment the patient felt better.





Diagnostics interpreted by me:


ECG: As above.





The labs revealed a significant anemia on CBC.  Chemistry panel revealed an 

elevated creatinine but this was baseline.





Imaging studies:


Deferred





Consultation:


A consultation was placed with the hospitalist. The case was discussed and 

diagnostics were reviewed.  The patient was evaluated in the ER for further 

treatment.





Medication Reconcilliation


Current Medication List:  was personally reviewed by me





Blood Pressure Screening


Patient's blood pressure:  Elevated blood pressure


Referred to the hospitalist





Impression





 Primary Impression:  


 GI bleed





Critical Care


I have personally spent greater than 30 minutes of critical care time in the 

direct management of this patient.  This includes bedside care, interpretation 

of diagnostic studies, and testing, discussion with consultants, patient, and 

family members, and other required patient management activities.  This 30 

minutes is in excess of all separately billable procedures.





Scribe Attestation


The scribe's documentation has been prepared under my direction and personally 

reviewed by me in its entirety. I confirm that the note above accurately 

reflects all work, treatment, procedures, and medical decision making performed 

by me.





Departure Information


Dispostion


Being Evaluated By Hospitalist





Prescriptions





Furosemide (LASIX) 20 Mg Tab


20 MG PO Q2D for 30 Days, #8 TAB 2 Refills


   Prov: Tahmina Myers .BEATA         2/2/18





Referrals


Javed Mccormick M.D. (PCP)





Patient Instructions


My [facility]

## 2018-02-02 NOTE — DIAGNOSTIC IMAGING REPORT
ABD/PELVIS NO IV OR ORAL CONT



CLINICAL HISTORY: 72 years-old Male presenting with LLQ abdominal pain. 



TECHNIQUE: Multidetector CT of the abdomen and pelvis was performed without the

use of intravenous contrast. IV contrast: None. A dose lowering technique was

used consistent with the principles of ALARA (as low as reasonably achievable). 



COMPARISON: Ultrasound from 10/19/2017.



CT DOSE (mGy.cm): The estimated cumulative dose is 1165.12 mGy.cm.



FINDINGS:



 topogram: Single lead of an implanted cardiac for bladder to the right

ventricular apex partially visualized. Median sternotomy wires. Cardiomegaly.

Cholecystectomy clips. Penile implant.



Lung bases: Bandlike opacities in the right lower lobe likely atelectasis or

scarring. Multichamber enlargement of the heart. Lead to the right ventricle.

Sternal surgical clip noted anteriorly. Intraventricular blood pool is less

dense than adjacent myocardium consistent with anemia. Sternotomy wires.

Coronary artery calcification. No pericardial or pleural effusion. 



Liver: Normal morphology. Density consistent with hepatic steatosis.

Subcentimeter fatty lesion at the right hepatic dome.



Biliary: No intrahepatic or extrahepatic biliary ductal dilatation. Gallbladder

surgically absent.



Pancreas: Mild parenchymal atrophy.



Spleen: Normal noncontrast appearance.



Adrenal glands: Normal noncontrast appearance.



Kidneys and ureters: Vague hypodensity in the left lower pole of the kidney

posteriorly, possibly cysts but indeterminate. Punctate calcification at the

upper pole the left kidney possibly tiny calculus (series 3 image 206). No

hydronephrosis. Normal ureters. Nonspecific perinephric fat stranding.



Bladder: Normal.



Pelvic organs: Prostate enlargement likely secondary to benign prostatic

hyperplasia. Penile implants.



Bowel: Diverticulosis of the proximal sigmoid colon with mild wall thickening

likely indicating circular muscular hyperplasia. Diverticulosis of the

descending colon. Minimal pericolonic fat infiltration suggested along the

descending colon. The appendix is normal. No bowel obstruction. Duodenal

diverticulum at the level of the pancreatic head. No associated inflammatory

change.



Peritoneal cavity: No free fluid or intraperitoneal gas.



Lymph nodes: No gross lymphadenopathy allowing for noncontrast technique.



Vasculature: Atherosclerosis of the normal caliber abdominal aorta.



Abdominal wall: Small fat-containing umbilical hernia.



Musculoskeletal: Degenerative changes of the spine. Old rib fracture of the left

posterior eighth rib. Mild osteopenia.



IMPRESSION:

1.  Diverticulosis of the descending and sigmoid colon. Minimal pericolonic fat

infiltration along the descending colon suggests early acute on, located

diverticulitis. No CT evidence of perforation or abscess.



2.  Chronic diverticular disease of the sigmoid colon.



3.  Mild osteopenia suggested.



4.  Subcentimeter fatty lesion at the right hepatic dome may represent an

angiomyolipoma or lipoma.











Electronically signed by:  Yonathan Potts M.D.

2/2/2018 6:25 PM



Dictated Date/Time:  2/2/2018 6:11 PM

## 2018-02-03 VITALS
TEMPERATURE: 98.6 F | SYSTOLIC BLOOD PRESSURE: 156 MMHG | OXYGEN SATURATION: 92 % | DIASTOLIC BLOOD PRESSURE: 64 MMHG | HEART RATE: 70 BPM

## 2018-02-03 VITALS
DIASTOLIC BLOOD PRESSURE: 67 MMHG | SYSTOLIC BLOOD PRESSURE: 141 MMHG | TEMPERATURE: 98.24 F | HEART RATE: 62 BPM | OXYGEN SATURATION: 94 %

## 2018-02-03 VITALS
SYSTOLIC BLOOD PRESSURE: 134 MMHG | HEART RATE: 69 BPM | OXYGEN SATURATION: 94 % | DIASTOLIC BLOOD PRESSURE: 77 MMHG | TEMPERATURE: 98.6 F

## 2018-02-03 VITALS
OXYGEN SATURATION: 96 % | DIASTOLIC BLOOD PRESSURE: 78 MMHG | SYSTOLIC BLOOD PRESSURE: 167 MMHG | TEMPERATURE: 98.24 F | HEART RATE: 75 BPM

## 2018-02-03 VITALS
HEART RATE: 64 BPM | TEMPERATURE: 98.06 F | OXYGEN SATURATION: 95 % | DIASTOLIC BLOOD PRESSURE: 65 MMHG | SYSTOLIC BLOOD PRESSURE: 128 MMHG

## 2018-02-03 VITALS
SYSTOLIC BLOOD PRESSURE: 142 MMHG | HEART RATE: 70 BPM | OXYGEN SATURATION: 94 % | DIASTOLIC BLOOD PRESSURE: 76 MMHG | TEMPERATURE: 99.32 F

## 2018-02-03 VITALS
DIASTOLIC BLOOD PRESSURE: 60 MMHG | HEART RATE: 72 BPM | TEMPERATURE: 98.06 F | SYSTOLIC BLOOD PRESSURE: 155 MMHG | OXYGEN SATURATION: 91 %

## 2018-02-03 VITALS
HEART RATE: 79 BPM | TEMPERATURE: 98.6 F | DIASTOLIC BLOOD PRESSURE: 86 MMHG | SYSTOLIC BLOOD PRESSURE: 159 MMHG | OXYGEN SATURATION: 95 %

## 2018-02-03 VITALS
HEART RATE: 63 BPM | OXYGEN SATURATION: 93 % | DIASTOLIC BLOOD PRESSURE: 70 MMHG | TEMPERATURE: 97.52 F | SYSTOLIC BLOOD PRESSURE: 136 MMHG

## 2018-02-03 LAB
BUN SERPL-MCNC: 28 MG/DL (ref 7–18)
CALCIUM SERPL-MCNC: 7.4 MG/DL (ref 8.5–10.1)
CO2 SERPL-SCNC: 26 MMOL/L (ref 21–32)
CREAT SERPL-MCNC: 2.1 MG/DL (ref 0.6–1.4)
EOSINOPHIL NFR BLD AUTO: 198 K/UL (ref 130–400)
GLUCOSE SERPL-MCNC: 89 MG/DL (ref 70–99)
HCT VFR BLD CALC: 26.1 % (ref 42–52)
HCT VFR BLD CALC: 26.1 % (ref 42–52)
HCT VFR BLD CALC: 27.3 % (ref 42–52)
HCT VFR BLD CALC: 27.6 % (ref 42–52)
HCT VFR BLD CALC: 27.9 % (ref 42–52)
HGB BLD-MCNC: 8.7 G/DL (ref 14–18)
HGB BLD-MCNC: 8.7 G/DL (ref 14–18)
HGB BLD-MCNC: 9.1 G/DL (ref 14–18)
HGB BLD-MCNC: 9.1 G/DL (ref 14–18)
HGB BLD-MCNC: 9.3 G/DL (ref 14–18)
MCH RBC QN AUTO: 28.3 PG (ref 25–34)
MCHC RBC AUTO-ENTMCNC: 33 G/DL (ref 32–36)
MCV RBC AUTO: 85.7 FL (ref 80–100)
PMV BLD AUTO: 11.1 FL (ref 7.4–10.4)
POTASSIUM SERPL-SCNC: 3.6 MMOL/L (ref 3.5–5.1)
RED CELL DISTRIBUTION WIDTH CV: 15.4 % (ref 11.5–14.5)
RED CELL DISTRIBUTION WIDTH SD: 48.1 FL (ref 36.4–46.3)
SODIUM SERPL-SCNC: 143 MMOL/L (ref 136–145)
WBC # BLD AUTO: 7.02 K/UL (ref 4.8–10.8)

## 2018-02-03 RX ADMIN — DULOXETINE SCH MG: 60 CAPSULE, DELAYED RELEASE PELLETS ORAL at 08:31

## 2018-02-03 RX ADMIN — INSULIN GLARGINE SCH UNITS: 100 INJECTION, SOLUTION SUBCUTANEOUS at 12:29

## 2018-02-03 RX ADMIN — AMPICILLIN SODIUM AND SULBACTAM SODIUM SCH MLS/HR: 2; 1 INJECTION, POWDER, FOR SOLUTION INTRAMUSCULAR; INTRAVENOUS at 15:56

## 2018-02-03 RX ADMIN — BUPROPION HYDROCHLORIDE SCH MG: 150 TABLET, EXTENDED RELEASE ORAL at 08:30

## 2018-02-03 RX ADMIN — Medication SCH INTER.UNIT: at 08:31

## 2018-02-03 RX ADMIN — ISOSORBIDE DINITRATE SCH MG: 10 TABLET ORAL at 12:28

## 2018-02-03 RX ADMIN — PANTOPRAZOLE SCH MG: 40 TABLET, DELAYED RELEASE ORAL at 08:30

## 2018-02-03 RX ADMIN — PREGABALIN SCH MG: 100 CAPSULE ORAL at 08:37

## 2018-02-03 RX ADMIN — DULOXETINE SCH MG: 30 CAPSULE, DELAYED RELEASE PELLETS ORAL at 21:12

## 2018-02-03 RX ADMIN — Medication SCH MG: at 21:12

## 2018-02-03 RX ADMIN — INSULIN ASPART SCH UNITS: 100 INJECTION, SOLUTION INTRAVENOUS; SUBCUTANEOUS at 07:00

## 2018-02-03 RX ADMIN — Medication SCH TAB: at 08:29

## 2018-02-03 RX ADMIN — AMPICILLIN SODIUM AND SULBACTAM SODIUM SCH MLS/HR: 2; 1 INJECTION, POWDER, FOR SOLUTION INTRAMUSCULAR; INTRAVENOUS at 02:41

## 2018-02-03 RX ADMIN — AMPICILLIN SODIUM AND SULBACTAM SODIUM SCH MLS/HR: 2; 1 INJECTION, POWDER, FOR SOLUTION INTRAMUSCULAR; INTRAVENOUS at 08:37

## 2018-02-03 RX ADMIN — AMPICILLIN SODIUM AND SULBACTAM SODIUM SCH MLS/HR: 2; 1 INJECTION, POWDER, FOR SOLUTION INTRAMUSCULAR; INTRAVENOUS at 21:12

## 2018-02-03 RX ADMIN — BUPROPION HYDROCHLORIDE SCH MG: 150 TABLET, EXTENDED RELEASE ORAL at 17:11

## 2018-02-03 RX ADMIN — LEVOTHYROXINE SODIUM SCH MCG: 50 TABLET ORAL at 06:24

## 2018-02-03 RX ADMIN — METOPROLOL SUCCINATE SCH MG: 25 TABLET, EXTENDED RELEASE ORAL at 21:13

## 2018-02-03 RX ADMIN — AMLODIPINE BESYLATE SCH MG: 5 TABLET ORAL at 08:30

## 2018-02-03 RX ADMIN — ROSUVASTATIN CALCIUM SCH MG: 20 TABLET, FILM COATED ORAL at 09:02

## 2018-02-03 RX ADMIN — INSULIN ASPART SCH UNITS: 100 INJECTION, SOLUTION INTRAVENOUS; SUBCUTANEOUS at 17:12

## 2018-02-03 RX ADMIN — PREGABALIN SCH MG: 100 CAPSULE ORAL at 21:13

## 2018-02-03 RX ADMIN — ALLOPURINOL SCH MG: 300 TABLET ORAL at 08:30

## 2018-02-03 RX ADMIN — ISOSORBIDE DINITRATE SCH MG: 10 TABLET ORAL at 08:30

## 2018-02-03 RX ADMIN — INSULIN ASPART SCH UNITS: 100 INJECTION, SOLUTION INTRAVENOUS; SUBCUTANEOUS at 12:29

## 2018-02-03 RX ADMIN — Medication SCH MG: at 08:31

## 2018-02-03 RX ADMIN — INSULIN ASPART SCH UNITS: 100 INJECTION, SOLUTION INTRAVENOUS; SUBCUTANEOUS at 21:16

## 2018-02-03 NOTE — PROGRESS NOTE
Internal Med Progress Note


Date of Service:


Feb 3, 2018.


Provider Documentation:





SUBJECTIVE:





Seen and examined at bedside


Denies CP, SOB, Abdominal pain, nausea


Had Bloody BM overnight


No other complaints








OBJECTIVE:





Vital Signs-as noted below





Physical Exam:


General Appearance:Moderately built and nourished, no apparent distress


Head:  normocephalic, Atraumatic 


Eyes:  normal inspection, EOMI, PERRL


Neck:  supple, Trachea midline


Respiratory/Chest: Normal breath sounds, CTA


Cardiovascular: S1, S2, No murmur


Abdomen/GI:Soft, Non tender, +Obese, Bowel sounds present


Extremities/Musculoskelatal:normal inspection, no edema, R toes S/P amputation 


Neurologic/Psych:AAOX3, grossly no focal neurological deficits 


Skin:  normal color, warm





Lab data as noted below.


ASSESSMENT & PLAN:





Acute Diverticulitis 


Acute Blood Loss Anemia 


Acute Lower GI bleeding 


Patient presented with persistent bright red bleeding per rectum


S/P 2 units PRBC


Monitor H&H


ASA/Plavix held


Transfuse PRBC PRN


Last Colonoscopy in 2012: Diverticulosis throughout entire colon 


CT: Suggestive of  Early diverticulitis in the descending colon


Continue Unasyn


Appreciate GI Input


Needs Colonoscopy in 6-8 weeks as outpatient








CAD


S/P AICD


stable


hold ASA and Plavix 


continue beta blocker, nitrate, and statin 








HTN


Stable


continue amlodipine, isosorbide, and metoprolol 








H/O Diastolic CHF


Appears euvolemic


hold Lasix for now 








CKD IV:


Cr at baseline


monitor renal function


 





DM II


Last A1C:7.0 10/2017


Continue ISS, Lantus








Hypothyroidism: 


continue levothyroxine








DVT Px:


SCDs Re: GI bleed








Code Status:


Full Code





Disposition:


Monitor


Vital Signs:











  Date Time  Temp Pulse Resp B/P (MAP) Pulse Ox O2 Delivery O2 Flow Rate FiO2


 


2/3/18 08:00      Room Air  


 


2/3/18 07:30 36.8 62 20 141/67 (91) 94 Room Air  


 


2/3/18 04:00      Room Air  


 


2/3/18 03:30 37.4 70 20 142/76 (98) 94 Room Air  


 


2/3/18 01:02 37.0 79 18 159/86 95   


 


2/3/18 00:01 36.8 75 20 167/78 96   


 


2/2/18 23:59     96 Room Air  


 


2/2/18 23:30 36.8 76 20 156/87 96   


 


2/2/18 23:30 37.2 78 23 144/82 (102) 94 Room Air  


 


2/2/18 23:15 37.2 78 20 167/80 95   


 


2/2/18 23:01 37.2 77 20 170/79 95   


 


2/2/18 22:33 36.6 75 14 166/77    


 


2/2/18 22:22 36.6 75 14 161/83 93   


 


2/2/18 22:01 36.6 78 14 163/79 90   


 


2/2/18 21:29  75 16 146/74 92   


 


2/2/18 20:30  82  117/78 93   


 


2/2/18 20:00 36.6 82 16 166/97 96   


 


2/2/18 19:43 36.6 68 20 161/83 96 Room Air  


 


2/2/18 19:35 36.6 63 16 168/84 95   


 


2/2/18 19:09 36.5 72 18 157/69 93   


 


2/2/18 19:02  67 22 139/74 97   


 


2/2/18 18:56 36.4 66 18 147/74 93   


 


2/2/18 18:41 36.5 67 18 135/69 96 Room Air  


 


2/2/18 17:26  68 18 114/68 92 Room Air  


 


2/2/18 16:16  66 20 178/62 91 Room Air  


 


2/2/18 15:48     95 Room Air  


 


2/2/18 15:18  61      


 


2/2/18 15:10 36.4 62 20 148/67 96 Room Air  








Lab Results:





Results Past 24 Hours








Test


  2/2/18


15:45 2/2/18


15:46 2/2/18


16:50 2/2/18


17:50 Range/Units


 


 


White Blood Count 8.38    4.8-10.8  K/uL


 


Red Blood Count 3.02    4.7-6.1  M/uL


 


Hemoglobin 8.4   8.2 14.0-18.0  g/dL


 


Hematocrit 26.2   24.8 42-52  %


 


Mean Corpuscular Volume 86.8      fL


 


Mean Corpuscular Hemoglobin 27.8    25-34  pg


 


Mean Corpuscular Hemoglobin


Concent 32.1


  


  


  


  32-36  g/dl


 


 


Platelet Count 246    130-400  K/uL


 


Mean Platelet Volume 10.5    7.4-10.4  fL


 


Neutrophils (%) (Auto) 63.2     %


 


Lymphocytes (%) (Auto) 26.0     %


 


Monocytes (%) (Auto) 6.6     %


 


Eosinophils (%) (Auto) 2.1     %


 


Basophils (%) (Auto) 0.8     %


 


Neutrophils # (Auto) 5.29    1.4-6.5  K/uL


 


Lymphocytes # (Auto) 2.18    1.2-3.4  K/uL


 


Monocytes # (Auto) 0.55    0.11-0.59  K/uL


 


Eosinophils # (Auto) 0.18    0-0.5  K/uL


 


Basophils # (Auto) 0.07    0-0.2  K/uL


 


RDW Standard Deviation 46.8    36.4-46.3  fL


 


RDW Coefficient of Variation 14.9    11.5-14.5  %


 


Immature Granulocyte % (Auto) 1.3     %


 


Immature Granulocyte # (Auto) 0.11    0.00-0.02  K/uL


 


Anisocytosis PRESENT     


 


Prothrombin Time


  


  11.4


  


  


  9.0-12.0


SECONDS


 


Prothromb Time International


Ratio 


  1.1


  


  


  0.9-1.1  


 


 


Activated Partial


Thromboplast Time 


  27.0


  


  


  21.0-31.0


SECONDS


 


Partial Thromboplastin Ratio  1.0    


 


Sodium Level  139   136-145  mmol/L


 


Potassium Level  3.7   3.5-5.1  mmol/L


 


Chloride Level  105     mmol/L


 


Carbon Dioxide Level  28   21-32  mmol/L


 


Anion Gap  6.0   3-11  mmol/L


 


Blood Urea Nitrogen  32   7-18  mg/dl


 


Creatinine


  


  2.48


  


  


  0.60-1.40


mg/dl


 


Est Creatinine Clear Calc


Drug Dose 


  34.5


  


  


   ml/min


 


 


Estimated GFR (


American) 


  28.9


  


  


   


 


 


Estimated GFR (Non-


American 


  25.0


  


  


   


 


 


BUN/Creatinine Ratio  13.0   10-20  


 


Random Glucose  184   70-99  mg/dl


 


Calcium Level  7.7   8.5-10.1  mg/dl


 


Total Bilirubin  0.4   0.2-1  mg/dl


 


Direct Bilirubin  0.2   0-0.2  mg/dl


 


Aspartate Amino Transf


(AST/SGOT) 


  14


  


  


  15-37  U/L


 


 


Alanine Aminotransferase


(ALT/SGPT) 


  21


  


  


  12-78  U/L


 


 


Alkaline Phosphatase  35     U/L


 


Troponin I  < 0.015   0-0.045  ng/ml


 


Total Protein  6.0   6.4-8.2  gm/dl


 


Albumin  2.8   3.4-5.0  gm/dl


 


Lipase  117     U/L


 


Urine Color   YELLOW   


 


Urine Appearance   CLOUDY  CLEAR  


 


Urine pH   6.5  4.5-7.5  


 


Urine Specific Gravity   1.021  1.000-1.030  


 


Urine Protein   2+  NEG  


 


Urine Glucose (UA)   2+  NEG  


 


Urine Ketones   NEG  NEG  


 


Urine Occult Blood   NEG  NEG  


 


Urine Nitrite   NEG  NEG  


 


Urine Bilirubin   NEG  NEG  


 


Urine Urobilinogen   NEG  NEG  


 


Urine Leukocyte Esterase   NEG  NEG  


 


Urine WBC (Auto)   5-10  0-5  /hpf


 


Urine RBC (Auto)   0-4  0-4  /hpf


 


Urine Hyaline Casts (Auto)   1-5  0-5  /lpf


 


Urine Epithelial Cells (Auto)   >30  0-5  /lpf


 


Urine Bacteria (Auto)   NEG  NEG  


 


Test


  2/2/18


19:54 2/3/18


02:08 2/3/18


05:22 2/3/18


06:37 Range/Units


 


 


Bedside Glucose 89   90 70-99  mg/dl


 


Hemoglobin  9.1 9.1  14.0-18.0  g/dL


 


Hematocrit  27.3 27.6  42-52  %


 


White Blood Count   7.02  4.8-10.8  K/uL


 


Red Blood Count   3.22  4.7-6.1  M/uL


 


Mean Corpuscular Volume   85.7    fL


 


Mean Corpuscular Hemoglobin   28.3  25-34  pg


 


Mean Corpuscular Hemoglobin


Concent 


  


  33.0


  


  32-36  g/dl


 


 


RDW Standard Deviation   48.1  36.4-46.3  fL


 


RDW Coefficient of Variation   15.4  11.5-14.5  %


 


Platelet Count   198  130-400  K/uL


 


Mean Platelet Volume   11.1  7.4-10.4  fL


 


Platelet Estimate   NORMAL   


 


Sodium Level   143  136-145  mmol/L


 


Potassium Level   3.6  3.5-5.1  mmol/L


 


Chloride Level   111    mmol/L


 


Carbon Dioxide Level   26  21-32  mmol/L


 


Anion Gap   6.0  3-11  mmol/L


 


Blood Urea Nitrogen   28  7-18  mg/dl


 


Creatinine


  


  


  2.10


  


  0.60-1.40


mg/dl


 


Est Creatinine Clear Calc


Drug Dose 


  


  40.5


  


   ml/min


 


 


Estimated GFR (


American) 


  


  35.4


  


   


 


 


Estimated GFR (Non-


American 


  


  30.5


  


   


 


 


BUN/Creatinine Ratio   13.4  10-20  


 


Random Glucose   89  70-99  mg/dl


 


Calcium Level   7.4  8.5-10.1  mg/dl


 


Test


  2/3/18


11:12 


  


  


  Range/Units


 


 


Hemoglobin 9.3    14.0-18.0  g/dL


 


Hematocrit 27.9    42-52  %

## 2018-02-03 NOTE — GASTROINTESTINAL CONSULTATION
Gastrointestinal Consultation


Date of Consultation:  Feb 3, 2018


Attending Physician:  Dr. Boyce


Consulting Physician:  Dr. Elliott


Reason for Consultation:  lower GI bleed


History of Present Illness


Patient is a 72 year old male with multiple medical problems including CAD s/p 

CABG, PVD s/p fem-pop bypass, DM, CKD, GERD, and HTN who presented to the ER 

last evening with complaints of rectal bleeding which started earlier that day.

  He denies any associated abdominal pain.  He did initially have some nausea. 

No vomiting.  He denies any prior history of GI bleeding.  He did have a 

colonoscopy in the past but it was "years and years ago" per patient.  





Feels good this AM.  Had a small amount of stool around 4AM with some blood, 

but much less than prior per patient.  No abd pain. Hungry.





Labs on arrival are significant for hgb of 8.4 (last hgb noted was 12.2 in 

October 2017).  He was given 2 units of blood given his cardiac history.  Hgb 

this AM was 9.1.





CT scan showed significant left sided diverticulosis as well as ?changes of 

early diverticulitis in the descending colon.  He was started on antibiotics.





Past Medical/Surgical History


Medical Problems:


(1) GI bleed


Status: Acute  





(2) Hypertension


Status: Acute  





(3) Hypoxia


Status: Acute  





(4) SOB (shortness of breath)


Status: Acute  








Past Medical History:


as noted in HPI


Past Surgical History:


AICD, hernia repair, CABG, cholecystectomy, fem-pop bypass





Family History





FH: CAD (coronary artery disease)


  FATHER


Stroke


  MOTHER


+CAD; no family history of colon cancer





Social History


Smoking Status:  Current Every Day Smoker


Drug Use:  none


Marital Status:  


Housing Status:  lives with significant other





Allergies


Coded Allergies:  


     Moxifloxacin (Unverified  Adverse Reaction, Severe, "DEPLETED POTASSIUM 

AND MAGNESIUM. CARDIAC ARREST" PT WIFE, 2/2/18)





Current Medications





Home Meds and Scripts








 Medications  Dose


 Route/Sig


 Max Daily Dose Days Date Category


 


 Lasix


  (Furosemide) 20


 Mg Tab  20 Mg


 PO Q2D


   30 2/2/18 Rx


 


 Lantus Solostar


  (Insulin


 Glargine) 100


 Unit/Ml Inj  50 Units


 SC BID


    2/2/18 Reported


 


 Novolog Flexpen


  (Insulin Aspart)


 100 Units/Ml Inj  0 Units


 SC ACHS


   30 10/24/17 Rx


 


 Lyrica


  (Pregabalin) 100


 Mg Cap  100 Mg


 PO BID


   7 10/24/17 Rx


 


 Bupropion HCl Sr


  (Bupropion HCl)


 150 Mg Tabcr  150 Mg


 PO BID17


   30 10/24/17 Rx


 


 Isosorbide


 Dinitrate 5 Mg Tab  10 Mg


 PO BID


   30 10/24/17 Rx


 


 Combivent


 Respimat


  (Ipratropium-Albuterol)


 1 Aer Aer  1 Puffs


 INH QID PRN


   30 10/24/17 Rx


 


 Oxygen  Gas  2 Liters


 NA DAILY PRN


   30 10/24/17 Rx


 


 Nitrostat


  (Nitroglycerin)


 0.4 Mg Tab  0.4 Mg


 UT PRN


    10/15/17 Reported


 


 Fish Oil 1200 mg


  (Omega-3 Fatty


 Acids) 1 Cap Cap  2 Cap


 PO BID


    10/15/17 Reported


 


 Multivitamin


  (Multivitamins)


 Tab  1 Tab


 PO DAILY


    10/15/17 Reported


 


 Mag-Ox (Magnesium


 Oxide) 400 Mg Tab  400 Mg


 PO BID


    10/15/17 Reported


 


 Vitamin D3


  (Cholecalciferol)


 1,000 Unit Tab  2 Tab


 PO DAILY


    10/15/17 Reported


 


 Aspirin Ec


  (Aspirin) 81 Mg


 Tab  81 Mg


 PO DAILY


    10/15/17 Reported


 


 Plavix


  (Clopidogrel


 Bisulfate) 75 Mg


 Tab  75 Mg


 PO DAILY


    10/15/17 Reported


 


 Toprol-Xl


  (Metoprolol


 Succinate) 25 Mg


 Tabcr  1.5 Tab


 PO HS


    10/15/17 Reported


 


 Zyloprim


  (Allopurinol) 300


 Mg Tab  300 Mg


 PO DAILY


    10/15/17 Reported


 


 Levothyroxine


 Sodium 50 Mcg Tab  1 Tab


 PO DAILYBB


    10/15/17 Reported


 


 Crestor


  (Rosuvastatin


 Calcium) 40 Mg Tab  40 Mg


 PO DAILY


    10/15/17 Reported


 


 Tricor


  (Fenofibrate) 200


 Mg Cap  200 Mg


 PO DAILY


    10/15/17 Reported


 


 Norvasc


  (Amlodipine


 Besylate) 10 Mg


 Tab  10 Mg


 PO DAILY


    10/15/17 Reported


 


 Protonix


  (Pantoprazole


 Sodium) 40 Mg Tab  40 Mg


 PO DAILY


    10/15/17 Reported


 


 Cymbalta


  (Duloxetine HCl)


 30 Mg Cap  1 Cap


 PO HS


    10/15/17 Reported


 


 Cymbalta


  (Duloxetine Hcl)


 60 Mg Cap  60 Mg


 PO DAILY


    10/15/17 Reported











Review of Systems


12 systems reviewed and negative except as noted





Physical Exam











  Date Time  Temp Pulse Resp B/P (MAP) Pulse Ox O2 Delivery O2 Flow Rate FiO2


 


2/3/18 07:30 36.8 62 20 141/67 (91) 94 Room Air  


 


2/3/18 04:00      Room Air  


 


2/3/18 03:30 37.4 70 20 142/76 (98) 94 Room Air  


 


2/3/18 01:02 37.0 79 18 159/86 95   


 


2/3/18 00:01 36.8 75 20 167/78 96   


 


2/2/18 23:59     96 Room Air  


 


2/2/18 23:30 36.8 76 20 156/87 96   


 


2/2/18 23:30 37.2 78 23 144/82 (102) 94 Room Air  


 


2/2/18 23:15 37.2 78 20 167/80 95   


 


2/2/18 23:01 37.2 77 20 170/79 95   


 


2/2/18 22:33 36.6 75 14 166/77    


 


2/2/18 22:22 36.6 75 14 161/83 93   


 


2/2/18 22:01 36.6 78 14 163/79 90   


 


2/2/18 21:29  75 16 146/74 92   


 


2/2/18 20:30  82  117/78 93   


 


2/2/18 20:00 36.6 82 16 166/97 96   


 


2/2/18 19:43 36.6 68 20 161/83 96 Room Air  


 


2/2/18 19:35 36.6 63 16 168/84 95   


 


2/2/18 19:09 36.5 72 18 157/69 93   


 


2/2/18 19:02  67 22 139/74 97   


 


2/2/18 18:56 36.4 66 18 147/74 93   


 


2/2/18 18:41 36.5 67 18 135/69 96 Room Air  


 


2/2/18 17:26  68 18 114/68 92 Room Air  


 


2/2/18 16:16  66 20 178/62 91 Room Air  


 


2/2/18 15:48     95 Room Air  


 


2/2/18 15:18  61      


 


2/2/18 15:10 36.4 62 20 148/67 96 Room Air  








General Appearance:  WD/WN, no apparent distress


Eyes:  normal inspection, PERRL


ENT:  normal ENT inspection, hearing grossly normal, pharynx normal


Neck:  supple, no adenopathy


Respiratory/Chest:  chest non-tender, lungs clear, normal breath sounds, no 

respiratory distress


Cardiovascular:  regular rate, rhythm, no edema


Abdomen:  normal bowel sounds, non tender, soft


Extremities:  normal range of motion, non-tender, + pedal edema, + pertinent 

finding (toe amputation)


Neurologic/Psych:  CNs II-XII nml as tested, no motor/sensory deficits, alert, 

normal mood/affect, oriented x 3


Skin:  normal color, no jaundice, warm/dry, no rash





Laboratory Results





Last 24 Hours








Test


  2/2/18


15:45 2/2/18


15:46 2/2/18


16:50 2/2/18


17:50


 


White Blood Count 8.38 K/uL    


 


Red Blood Count 3.02 M/uL    


 


Hemoglobin 8.4 g/dL    8.2 g/dL 


 


Hematocrit 26.2 %    24.8 % 


 


Mean Corpuscular Volume 86.8 fL    


 


Mean Corpuscular Hemoglobin 27.8 pg    


 


Mean Corpuscular Hemoglobin


Concent 32.1 g/dl 


  


  


  


 


 


Platelet Count 246 K/uL    


 


Mean Platelet Volume 10.5 fL    


 


Neutrophils (%) (Auto) 63.2 %    


 


Lymphocytes (%) (Auto) 26.0 %    


 


Monocytes (%) (Auto) 6.6 %    


 


Eosinophils (%) (Auto) 2.1 %    


 


Basophils (%) (Auto) 0.8 %    


 


Neutrophils # (Auto) 5.29 K/uL    


 


Lymphocytes # (Auto) 2.18 K/uL    


 


Monocytes # (Auto) 0.55 K/uL    


 


Eosinophils # (Auto) 0.18 K/uL    


 


Basophils # (Auto) 0.07 K/uL    


 


RDW Standard Deviation 46.8 fL    


 


RDW Coefficient of Variation 14.9 %    


 


Immature Granulocyte % (Auto) 1.3 %    


 


Immature Granulocyte # (Auto) 0.11 K/uL    


 


Anisocytosis PRESENT    


 


Prothrombin Time  11.4 SECONDS   


 


Prothromb Time International


Ratio 


  1.1 


  


  


 


 


Activated Partial


Thromboplast Time 


  27.0 SECONDS 


  


  


 


 


Partial Thromboplastin Ratio  1.0   


 


Sodium Level  139 mmol/L   


 


Potassium Level  3.7 mmol/L   


 


Chloride Level  105 mmol/L   


 


Carbon Dioxide Level  28 mmol/L   


 


Anion Gap  6.0 mmol/L   


 


Blood Urea Nitrogen  32 mg/dl   


 


Creatinine  2.48 mg/dl   


 


Est Creatinine Clear Calc


Drug Dose 


  34.5 ml/min 


  


  


 


 


Estimated GFR (


American) 


  28.9 


  


  


 


 


Estimated GFR (Non-


American 


  25.0 


  


  


 


 


BUN/Creatinine Ratio  13.0   


 


Random Glucose  184 mg/dl   


 


Calcium Level  7.7 mg/dl   


 


Total Bilirubin  0.4 mg/dl   


 


Direct Bilirubin  0.2 mg/dl   


 


Aspartate Amino Transf


(AST/SGOT) 


  14 U/L 


  


  


 


 


Alanine Aminotransferase


(ALT/SGPT) 


  21 U/L 


  


  


 


 


Alkaline Phosphatase  35 U/L   


 


Troponin I  < 0.015 ng/ml   


 


Total Protein  6.0 gm/dl   


 


Albumin  2.8 gm/dl   


 


Lipase  117 U/L   


 


Urine Color   YELLOW  


 


Urine Appearance   CLOUDY  


 


Urine pH   6.5  


 


Urine Specific Gravity   1.021  


 


Urine Protein   2+  


 


Urine Glucose (UA)   2+  


 


Urine Ketones   NEG  


 


Urine Occult Blood   NEG  


 


Urine Nitrite   NEG  


 


Urine Bilirubin   NEG  


 


Urine Urobilinogen   NEG  


 


Urine Leukocyte Esterase   NEG  


 


Urine WBC (Auto)   5-10 /hpf  


 


Urine RBC (Auto)   0-4 /hpf  


 


Urine Hyaline Casts (Auto)   1-5 /lpf  


 


Urine Epithelial Cells (Auto)   >30 /lpf  


 


Urine Bacteria (Auto)   NEG  


 


Test


  2/2/18


19:54 2/3/18


02:08 2/3/18


05:22 2/3/18


06:37


 


Bedside Glucose 89 mg/dl    90 mg/dl 


 


Hemoglobin  9.1 g/dL  9.1 g/dL  


 


Hematocrit  27.3 %  27.6 %  


 


White Blood Count   7.02 K/uL  


 


Red Blood Count   3.22 M/uL  


 


Mean Corpuscular Volume   85.7 fL  


 


Mean Corpuscular Hemoglobin   28.3 pg  


 


Mean Corpuscular Hemoglobin


Concent 


  


  33.0 g/dl 


  


 


 


RDW Standard Deviation   48.1 fL  


 


RDW Coefficient of Variation   15.4 %  


 


Platelet Count   198 K/uL  


 


Mean Platelet Volume   11.1 fL  


 


Platelet Estimate   NORMAL  


 


Sodium Level   143 mmol/L  


 


Potassium Level   3.6 mmol/L  


 


Chloride Level   111 mmol/L  


 


Carbon Dioxide Level   26 mmol/L  


 


Anion Gap   6.0 mmol/L  


 


Blood Urea Nitrogen   28 mg/dl  


 


Creatinine   2.10 mg/dl  


 


Est Creatinine Clear Calc


Drug Dose 


  


  40.5 ml/min 


  


 


 


Estimated GFR (


American) 


  


  35.4 


  


 


 


Estimated GFR (Non-


American 


  


  30.5 


  


 


 


BUN/Creatinine Ratio   13.4  


 


Random Glucose   89 mg/dl  


 


Calcium Level   7.4 mg/dl  











Impression


Patient is a 72 year old male smoker with significant cardiovascular disease on 

ASA and Plavix admitted with painless rectal bleeding and a drop in his 

hemoglobin.  He has diverticulosis.  His presentation is suggestive of a 

diverticular bleed, though given his comorbidities, ischemic colitis is also a 

possibility.  





Early changes of diverticulitis noted on CT scan.





Plan


- Follow H/H.


- Suspect this will be a self limited lower GI bleed.  He will need an 

outpatient colonoscopy in 6-8 weeks once he has completed course of tx for the 

diverticulitis.


- OK to allow him to eat today.

## 2018-02-03 NOTE — PHARMACY PROGRESS NOTE
Glycemic Control Intl Consult


Date of Service


Feb 3, 2018.





Scope


Glycemic Pharmacist consulted by BEATA Lock on 2/2/18 for glycemic 

control and to write orders per [facility] inpatient glycemic control protocol





Objective


Weight (Kilograms):  112.400


Accuchecks BSG (last 24hrs):











Test


  2/2/18


15:46 2/2/18


19:54 2/3/18


05:22 2/3/18


06:37


 


Random Glucose


  184 mg/dl


(70-99) 


  89 mg/dl


(70-99) 


 


 


Bedside Glucose


  


  89 mg/dl


(70-99) 


  90 mg/dl


(70-99)


 


Test


  2/3/18


11:25 


  


  


 


 


Bedside Glucose


  95 mg/dl


(70-99) 


  


  


 








Laboratory Data (last 24hrs)











Test


  2/2/18


15:45 2/2/18


15:46 2/3/18


05:22


 


White Blood Count 8.38 K/uL   7.02 K/uL 


 


Red Blood Count 3.02 M/uL   


 


Hemoglobin 8.4 g/dL   


 


Hematocrit 26.2 %   


 


Mean Corpuscular Volume 86.8 fL   


 


Mean Corpuscular Hemoglobin 27.8 pg   


 


Mean Corpuscular Hemoglobin


Concent 32.1 g/dl 


  


  


 


 


Platelet Count 246 K/uL   


 


Mean Platelet Volume 10.5 fL   


 


Neutrophils (%) (Auto) 63.2 %   


 


Lymphocytes (%) (Auto) 26.0 %   


 


Monocytes (%) (Auto) 6.6 %   


 


Eosinophils (%) (Auto) 2.1 %   


 


Basophils (%) (Auto) 0.8 %   


 


Neutrophils # (Auto) 5.29 K/uL   


 


Lymphocytes # (Auto) 2.18 K/uL   


 


Monocytes # (Auto) 0.55 K/uL   


 


Eosinophils # (Auto) 0.18 K/uL   


 


Basophils # (Auto) 0.07 K/uL   


 


Anion Gap  6.0 mmol/L  6.0 mmol/L 


 


BUN/Creatinine Ratio  13.0  13.4 


 


Blood Urea Nitrogen  32 mg/dl  28 mg/dl 


 


Creatinine  2.48 mg/dl  2.10 mg/dl 


 


Potassium Level  3.7 mmol/L  3.6 mmol/L 


 


Sodium Level  139 mmol/L  143 mmol/L 











Recent Pertinent Medications


Outpatient Anti-diabetic Regimen: 


* Lantus 50 units BID


* Novolog ACHS 


* A1c = 7.0 %  10/16/17





Risk Factors for Insulin Resistance:


* Infection: Unasyn


* Diet: Type 2 DM





Assessment & Plan


ASSESSMENT:


* 72 year old type 2 diabetic known to pharmacy glycemic service from admission 

in October 2017, admitted with lower GI bleed.


* Pt was NPO after midnight and BSG 90mg/dl this morning, so I held patient's 

Lantus, then resumed it at 1200 as pt was started on a diet.


* Pt was well controlled by pharmacy last admission so I will begin a similar 

regimen and titrate to goal at this time


* Pt due for new A1c





PLAN FOR INPATIENT GLYCEMIC CONTROL:


* A1c with AM Labs


* Basal insulin with LANTUS 14 units SQ BID





* Correctional Insulin with NOVOLOG per scale ACHS or Q6hrs while NPO


 * Goal Range:  Low 110 mg/dL - High 140 mg/dL


 * Correction Factor: 20 mg/dL/unit


 * Nutritional / Prandial insulin per carb ratio of 1 unit per 10 grams CHO 

consumed





* Please note that the plan above was derived based on current level of insulin 

resistance and hospital stress. These recommendations are appropriate for 

inpatient admission only. Plan of care upon discharge will need to be 

reassessed to avoid potential outpatient hypo/hyperglycemia. 





Thank you.

## 2018-02-04 VITALS
TEMPERATURE: 99.14 F | SYSTOLIC BLOOD PRESSURE: 125 MMHG | OXYGEN SATURATION: 96 % | DIASTOLIC BLOOD PRESSURE: 64 MMHG | HEART RATE: 63 BPM

## 2018-02-04 VITALS
OXYGEN SATURATION: 90 % | TEMPERATURE: 98.24 F | DIASTOLIC BLOOD PRESSURE: 74 MMHG | SYSTOLIC BLOOD PRESSURE: 152 MMHG | HEART RATE: 70 BPM

## 2018-02-04 VITALS
SYSTOLIC BLOOD PRESSURE: 132 MMHG | HEART RATE: 56 BPM | DIASTOLIC BLOOD PRESSURE: 68 MMHG | OXYGEN SATURATION: 94 % | TEMPERATURE: 98.06 F

## 2018-02-04 VITALS
HEART RATE: 74 BPM | OXYGEN SATURATION: 92 % | SYSTOLIC BLOOD PRESSURE: 154 MMHG | TEMPERATURE: 98.06 F | DIASTOLIC BLOOD PRESSURE: 61 MMHG

## 2018-02-04 VITALS
OXYGEN SATURATION: 92 % | SYSTOLIC BLOOD PRESSURE: 148 MMHG | TEMPERATURE: 97.7 F | DIASTOLIC BLOOD PRESSURE: 73 MMHG | HEART RATE: 65 BPM

## 2018-02-04 LAB
BUN SERPL-MCNC: 28 MG/DL (ref 7–18)
CALCIUM SERPL-MCNC: 7.7 MG/DL (ref 8.5–10.1)
CO2 SERPL-SCNC: 27 MMOL/L (ref 21–32)
CREAT SERPL-MCNC: 2.42 MG/DL (ref 0.6–1.4)
EOSINOPHIL NFR BLD AUTO: 202 K/UL (ref 130–400)
GLUCOSE SERPL-MCNC: 164 MG/DL (ref 70–99)
HCT VFR BLD CALC: 26.5 % (ref 42–52)
HGB BLD-MCNC: 8.6 G/DL (ref 14–18)
MCH RBC QN AUTO: 28.7 PG (ref 25–34)
MCHC RBC AUTO-ENTMCNC: 32.5 G/DL (ref 32–36)
MCV RBC AUTO: 88.3 FL (ref 80–100)
NRBC BLD AUTO-RTO: 0.8 %
NUCLEATED RED BLOOD CELL ABS: 0.04 K/UL (ref 0–0)
PMV BLD AUTO: 10.4 FL (ref 7.4–10.4)
POTASSIUM SERPL-SCNC: 3.5 MMOL/L (ref 3.5–5.1)
RED CELL DISTRIBUTION WIDTH CV: 15.9 % (ref 11.5–14.5)
RED CELL DISTRIBUTION WIDTH SD: 50.8 FL (ref 36.4–46.3)
SODIUM SERPL-SCNC: 141 MMOL/L (ref 136–145)
WBC # BLD AUTO: 5.48 K/UL (ref 4.8–10.8)

## 2018-02-04 RX ADMIN — INSULIN ASPART SCH UNITS: 100 INJECTION, SOLUTION INTRAVENOUS; SUBCUTANEOUS at 08:52

## 2018-02-04 RX ADMIN — ALLOPURINOL SCH MG: 300 TABLET ORAL at 07:29

## 2018-02-04 RX ADMIN — DULOXETINE SCH MG: 30 CAPSULE, DELAYED RELEASE PELLETS ORAL at 21:51

## 2018-02-04 RX ADMIN — PANTOPRAZOLE SCH MG: 40 TABLET, DELAYED RELEASE ORAL at 07:29

## 2018-02-04 RX ADMIN — Medication SCH TAB: at 07:28

## 2018-02-04 RX ADMIN — Medication SCH MG: at 21:51

## 2018-02-04 RX ADMIN — AMPICILLIN SODIUM AND SULBACTAM SODIUM SCH MLS/HR: 2; 1 INJECTION, POWDER, FOR SOLUTION INTRAMUSCULAR; INTRAVENOUS at 15:25

## 2018-02-04 RX ADMIN — AMPICILLIN SODIUM AND SULBACTAM SODIUM SCH MLS/HR: 2; 1 INJECTION, POWDER, FOR SOLUTION INTRAMUSCULAR; INTRAVENOUS at 09:03

## 2018-02-04 RX ADMIN — DULOXETINE SCH MG: 60 CAPSULE, DELAYED RELEASE PELLETS ORAL at 07:28

## 2018-02-04 RX ADMIN — ROSUVASTATIN CALCIUM SCH MG: 20 TABLET, FILM COATED ORAL at 07:28

## 2018-02-04 RX ADMIN — ISOSORBIDE DINITRATE SCH MG: 10 TABLET ORAL at 06:26

## 2018-02-04 RX ADMIN — PREGABALIN SCH MG: 100 CAPSULE ORAL at 21:51

## 2018-02-04 RX ADMIN — INSULIN ASPART SCH UNITS: 100 INJECTION, SOLUTION INTRAVENOUS; SUBCUTANEOUS at 16:48

## 2018-02-04 RX ADMIN — Medication SCH MG: at 07:29

## 2018-02-04 RX ADMIN — ISOSORBIDE DINITRATE SCH MG: 10 TABLET ORAL at 11:34

## 2018-02-04 RX ADMIN — BUPROPION HYDROCHLORIDE SCH MG: 150 TABLET, EXTENDED RELEASE ORAL at 16:47

## 2018-02-04 RX ADMIN — Medication SCH INTER.UNIT: at 07:29

## 2018-02-04 RX ADMIN — BUPROPION HYDROCHLORIDE SCH MG: 150 TABLET, EXTENDED RELEASE ORAL at 08:51

## 2018-02-04 RX ADMIN — FENOFIBRATE SCH MG: 200 CAPSULE ORAL at 07:29

## 2018-02-04 RX ADMIN — INSULIN GLARGINE SCH UNITS: 100 INJECTION, SOLUTION SUBCUTANEOUS at 08:53

## 2018-02-04 RX ADMIN — PREGABALIN SCH MG: 100 CAPSULE ORAL at 07:36

## 2018-02-04 RX ADMIN — INSULIN ASPART SCH UNITS: 100 INJECTION, SOLUTION INTRAVENOUS; SUBCUTANEOUS at 21:00

## 2018-02-04 RX ADMIN — AMPICILLIN SODIUM AND SULBACTAM SODIUM SCH MLS/HR: 2; 1 INJECTION, POWDER, FOR SOLUTION INTRAMUSCULAR; INTRAVENOUS at 21:51

## 2018-02-04 RX ADMIN — LEVOTHYROXINE SODIUM SCH MCG: 50 TABLET ORAL at 06:26

## 2018-02-04 RX ADMIN — INSULIN ASPART SCH UNITS: 100 INJECTION, SOLUTION INTRAVENOUS; SUBCUTANEOUS at 12:23

## 2018-02-04 RX ADMIN — AMPICILLIN SODIUM AND SULBACTAM SODIUM SCH MLS/HR: 2; 1 INJECTION, POWDER, FOR SOLUTION INTRAMUSCULAR; INTRAVENOUS at 03:57

## 2018-02-04 RX ADMIN — METOPROLOL SUCCINATE SCH MG: 25 TABLET, EXTENDED RELEASE ORAL at 21:51

## 2018-02-04 RX ADMIN — AMLODIPINE BESYLATE SCH MG: 5 TABLET ORAL at 07:29

## 2018-02-04 NOTE — PROGRESS NOTE
Internal Med Progress Note


Date of Service:


Feb 4, 2018.


Provider Documentation:





SUBJECTIVE:





Seen and examined at bedside


Had small BM with dark colored blood


Denies CP, SOB, Abdominal pain, nausea


No other complaints


Hb stable





OBJECTIVE:





Vital Signs-as noted below





Physical Exam:


General Appearance:Moderately built and nourished, no apparent distress


Head:  normocephalic, Atraumatic 


Eyes:  normal inspection, EOMI, PERRL


Neck:  supple, Trachea midline


Respiratory/Chest: Normal breath sounds, CTA


Cardiovascular: S1, S2, No murmur


Abdomen/GI:Soft, Non tender, +Obese, Bowel sounds present


Extremities/Musculoskelatal:normal inspection, no edema, R toes S/P amputation 


Neurologic/Psych:AAOX3, grossly no focal neurological deficits 


Skin:  normal color, warm





Lab data as noted below.


ASSESSMENT & PLAN:





Acute Diverticulitis


Acute Blood Loss Anemia 


Acute Lower GI bleeding  (H/O Diverticulosis, Polyps)


Patient presented with persistent bright red bleeding per rectum


S/P 2 units PRBC


Monitor H&H


ASA/Plavix held


Transfuse PRBC PRN


Last Colonoscopy in 2012: Diverticulosis throughout entire colon 


CT: Suggestive of  Early diverticulitis in the descending colon


Continue Unasyn Day # 3


Appreciate GI Input


Needs Colonoscopy in 6-8 weeks as outpatient


Hb:8.6 today








CAD


S/P AICD


stable


hold ASA and Plavix  for now


continue beta blocker, nitrate, and statin 








HTN


Stable


continue amlodipine, isosorbide, and metoprolol 








H/O Diastolic CHF


Appears euvolemic


hold Lasix for now 








CKD IV:


Cr at baseline


monitor renal function


 





H/O P.A.fib


Rate controlled


continue Metoprolol


Not a candidate for anticoagulation


monitor electrolytes


 





DM II


Last A1C:7.0 10/2017


Continue ISS, Lantus








Hypothyroidism: 


continue levothyroxine








DVT Px:


SCDs Re: GI bleed








Code Status:


Full Code





Disposition:


Monitor in Tele


Vital Signs:











  Date Time  Temp Pulse Resp B/P (MAP) Pulse Ox O2 Delivery O2 Flow Rate FiO2


 


2/4/18 07:55 37.3 63 20 125/64 (84) 96 Room Air  


 


2/4/18 04:00      Room Air  


 


2/4/18 00:02      Room Air  


 


2/3/18 23:30 37.0 69 19 134/77 (96) 94 Room Air  


 


2/3/18 20:00      Room Air  


 


2/3/18 19:38 36.4 63 20 136/70 (92) 93 Room Air  


 


2/3/18 16:00      Room Air  


 


2/3/18 15:58 36.7 64 18 128/65 (86) 95   


 


2/3/18 12:00      Room Air  


 


2/3/18 11:40 36.7 72 20 155/60 (91) 91 Room Air  








Lab Results:





Results Past 24 Hours








Test


  2/3/18


11:12 2/3/18


11:25 2/3/18


16:12 2/3/18


16:20 Range/Units


 


 


Hemoglobin 9.3   8.7 14.0-18.0  g/dL


 


Hematocrit 27.9   26.1 42-52  %


 


Bedside Glucose  95 145  70-99  mg/dl


 


Test


  2/3/18


19:50 2/3/18


20:56 2/4/18


05:42 2/4/18


06:42 Range/Units


 


 


Hemoglobin 8.7  8.6  14.0-18.0  g/dL


 


Hematocrit 26.1  26.5  42-52  %


 


Bedside Glucose  169  152 70-99  mg/dl


 


White Blood Count   5.48  4.8-10.8  K/uL


 


Red Blood Count   3.00  4.7-6.1  M/uL


 


Mean Corpuscular Volume   88.3    fL


 


Mean Corpuscular Hemoglobin   28.7  25-34  pg


 


Mean Corpuscular Hemoglobin


Concent 


  


  32.5


  


  32-36  g/dl


 


 


RDW Standard Deviation   50.8  36.4-46.3  fL


 


RDW Coefficient of Variation   15.9  11.5-14.5  %


 


Platelet Count   202  130-400  K/uL


 


Mean Platelet Volume   10.4  7.4-10.4  fL


 


Nucleated RBC Absolute Count


(auto) 


  


  0.04


  


  0-0  K/uL


 


 


Nucleated Red Blood Cells %   0.8   %


 


Sodium Level   141  136-145  mmol/L


 


Potassium Level   3.5  3.5-5.1  mmol/L


 


Chloride Level   109    mmol/L


 


Carbon Dioxide Level   27  21-32  mmol/L


 


Anion Gap   5.0  3-11  mmol/L


 


Blood Urea Nitrogen   28  7-18  mg/dl


 


Creatinine


  


  


  2.42


  


  0.60-1.40


mg/dl


 


Est Creatinine Clear Calc


Drug Dose 


  


  35.1


  


   ml/min


 


 


Estimated GFR (


American) 


  


  29.8


  


   


 


 


Estimated GFR (Non-


American 


  


  25.7


  


   


 


 


BUN/Creatinine Ratio   11.6  10-20  


 


Random Glucose   164  70-99  mg/dl


 


Calcium Level   7.7  8.5-10.1  mg/dl








Microbiology Results


2/4/18 C.difficile Toxin B Gene (PCR) - Final, Complete


         No C. difficile toxin B gene detected


2/4/18 Shiga Toxin Test, Received


         Pending


2/4/18 Stool Culture, Received


         Pending

## 2018-02-05 VITALS
HEART RATE: 71 BPM | TEMPERATURE: 98.06 F | DIASTOLIC BLOOD PRESSURE: 78 MMHG | SYSTOLIC BLOOD PRESSURE: 150 MMHG | OXYGEN SATURATION: 91 %

## 2018-02-05 VITALS
OXYGEN SATURATION: 97 % | HEART RATE: 68 BPM | TEMPERATURE: 98.78 F | SYSTOLIC BLOOD PRESSURE: 147 MMHG | DIASTOLIC BLOOD PRESSURE: 70 MMHG

## 2018-02-05 VITALS
HEART RATE: 71 BPM | SYSTOLIC BLOOD PRESSURE: 150 MMHG | OXYGEN SATURATION: 91 % | DIASTOLIC BLOOD PRESSURE: 78 MMHG | TEMPERATURE: 98.06 F

## 2018-02-05 VITALS
TEMPERATURE: 98.6 F | HEART RATE: 71 BPM | OXYGEN SATURATION: 90 % | DIASTOLIC BLOOD PRESSURE: 72 MMHG | SYSTOLIC BLOOD PRESSURE: 146 MMHG

## 2018-02-05 LAB
BUN SERPL-MCNC: 26 MG/DL (ref 7–18)
CALCIUM SERPL-MCNC: 8 MG/DL (ref 8.5–10.1)
CO2 SERPL-SCNC: 26 MMOL/L (ref 21–32)
CREAT SERPL-MCNC: 2.55 MG/DL (ref 0.6–1.4)
GLUCOSE SERPL-MCNC: 135 MG/DL (ref 70–99)
HBA1C MFR BLD: 7.9 % (ref 4.5–5.6)
HCT VFR BLD CALC: 25.5 % (ref 42–52)
HGB BLD-MCNC: 8.5 G/DL (ref 14–18)
POTASSIUM SERPL-SCNC: 3.5 MMOL/L (ref 3.5–5.1)
SODIUM SERPL-SCNC: 143 MMOL/L (ref 136–145)

## 2018-02-05 RX ADMIN — INSULIN ASPART SCH UNITS: 100 INJECTION, SOLUTION INTRAVENOUS; SUBCUTANEOUS at 00:00

## 2018-02-05 RX ADMIN — AMPICILLIN SODIUM AND SULBACTAM SODIUM SCH MLS/HR: 2; 1 INJECTION, POWDER, FOR SOLUTION INTRAMUSCULAR; INTRAVENOUS at 08:36

## 2018-02-05 RX ADMIN — INSULIN ASPART SCH UNITS: 100 INJECTION, SOLUTION INTRAVENOUS; SUBCUTANEOUS at 08:41

## 2018-02-05 RX ADMIN — INSULIN ASPART SCH UNITS: 100 INJECTION, SOLUTION INTRAVENOUS; SUBCUTANEOUS at 04:00

## 2018-02-05 RX ADMIN — FENOFIBRATE SCH MG: 200 CAPSULE ORAL at 08:31

## 2018-02-05 RX ADMIN — ALLOPURINOL SCH MG: 300 TABLET ORAL at 08:28

## 2018-02-05 RX ADMIN — Medication SCH INTER.UNIT: at 08:32

## 2018-02-05 RX ADMIN — AMLODIPINE BESYLATE SCH MG: 5 TABLET ORAL at 08:29

## 2018-02-05 RX ADMIN — ISOSORBIDE DINITRATE SCH MG: 10 TABLET ORAL at 08:28

## 2018-02-05 RX ADMIN — BUPROPION HYDROCHLORIDE SCH MG: 150 TABLET, EXTENDED RELEASE ORAL at 08:29

## 2018-02-05 RX ADMIN — ISOSORBIDE DINITRATE SCH MG: 10 TABLET ORAL at 06:13

## 2018-02-05 RX ADMIN — PREGABALIN SCH MG: 100 CAPSULE ORAL at 08:36

## 2018-02-05 RX ADMIN — Medication SCH TAB: at 08:30

## 2018-02-05 RX ADMIN — LEVOTHYROXINE SODIUM SCH MCG: 50 TABLET ORAL at 06:12

## 2018-02-05 RX ADMIN — INSULIN ASPART SCH UNITS: 100 INJECTION, SOLUTION INTRAVENOUS; SUBCUTANEOUS at 12:32

## 2018-02-05 RX ADMIN — AMPICILLIN SODIUM AND SULBACTAM SODIUM SCH MLS/HR: 2; 1 INJECTION, POWDER, FOR SOLUTION INTRAMUSCULAR; INTRAVENOUS at 03:00

## 2018-02-05 RX ADMIN — ROSUVASTATIN CALCIUM SCH MG: 20 TABLET, FILM COATED ORAL at 08:29

## 2018-02-05 RX ADMIN — PANTOPRAZOLE SCH MG: 40 TABLET, DELAYED RELEASE ORAL at 08:30

## 2018-02-05 RX ADMIN — Medication SCH MG: at 08:30

## 2018-02-05 RX ADMIN — DULOXETINE SCH MG: 60 CAPSULE, DELAYED RELEASE PELLETS ORAL at 08:28

## 2018-02-05 NOTE — PROGRESS NOTE
Internal Med Progress Note


Date of Service:


Feb 5, 2018.


Provider Documentation:





SUBJECTIVE:





Seen and examined at bedside


Doing well today


Denies any blood in stools


Denies CP, SOB, Abdominal pain, nausea


Hb stable


Family at bedside








OBJECTIVE:





Vital Signs-as noted below





Physical Exam:


General Appearance:Moderately built and nourished, no apparent distress


Head:  normocephalic, Atraumatic 


Eyes:  normal inspection, EOMI, PERRL


Neck:  supple, Trachea midline


Respiratory/Chest: Normal breath sounds, CTA


Cardiovascular: S1, S2, No murmur


Abdomen/GI:Soft, Non tender, +Obese, Bowel sounds present


Extremities/Musculoskelatal:normal inspection, no edema, R toes S/P amputation 


Neurologic/Psych:AAOX3, grossly no focal neurological deficits 


Skin:  normal color, warm





Lab data as noted below.


ASSESSMENT & PLAN:





Acute Diverticulitis


Acute Blood Loss Anemia 


Acute Lower GI bleeding  (H/O Diverticulosis, Polyps)


Patient presented with persistent bright red bleeding per rectum


S/P 2 units PRBC


Monitor H&H


ASA/Plavix held for now


Transfuse PRBC PRN


Last Colonoscopy in 2012: Diverticulosis throughout entire colon 


CT: Suggestive of  Early diverticulitis in the descending colon


Continue Unasyn Day # 4>>switch to PO Cipro and Flagyl


Appreciate GI Input


Needs Colonoscopy in 6-8 weeks as outpatient


Hb:8.5 today








CAD


S/P AICD


stable


hold ASA and Plavix  for now


continue beta blocker, nitrate, and statin 








HTN


Stable


continue amlodipine, isosorbide, and metoprolol 








H/O Diastolic CHF


Appears euvolemic


hold Lasix for now 








CKD IV:


Cr at baseline


monitor renal function


 





H/O P.A.fib


Rate controlled


continue Metoprolol


Not a candidate for anticoagulation


monitor electrolytes


 





DM II


Last A1C:7.0 10/2017


Continue ISS, Lantus








Hypothyroidism: 


continue levothyroxine








DVT Px:


SCDs Re: GI bleed








Code Status:


Full Code





Disposition:


Plan to discharge home today





Follow up with your Primary Care Physician  on 2/8/18 at 2:45pm


Follow up with your Cardiologist  or your PCP  regarding when 

to restart your Aspirin and Plavix


Follow up with your Gastroenterologist for colonoscopy in 6-8 weeks





Complete the antibiotic course as prescribed


Seek immediate medical attention if your symptoms reoccur or worsen


Vital Signs:











  Date Time  Temp Pulse Resp B/P (MAP) Pulse Ox O2 Delivery O2 Flow Rate FiO2


 


2/5/18 11:41      Room Air  


 


2/5/18 11:19 36.7 71 18 150/78 (102) 91 Room Air  


 


2/5/18 08:00      Room Air  


 


2/5/18 07:22 37.1 68 20 147/70 (95) 97 Room Air  


 


2/5/18 04:00      Room Air  


 


2/5/18 03:38 37.0 71 18 146/72 (96) 90 Room Air  


 


2/5/18 00:02      Room Air  


 


2/4/18 23:35 36.7 74 20 154/61 (92) 92 Room Air  


 


2/4/18 20:00      Room Air  


 


2/4/18 19:35 36.5 65 20 148/73 (98) 92 Room Air  


 


2/4/18 16:00      Room Air  


 


2/4/18 15:26 36.7 56 18 132/68 (89) 94 Room Air  


 


2/4/18 12:55 36.8 70 18 152/74 (100) 90 Room Air  








Lab Results:





Results Past 24 Hours








Test


  2/4/18


16:20 2/4/18


20:38 2/5/18


00:04 2/5/18


04:12 Range/Units


 


 


Bedside Glucose 258 138 161 144 70-99  mg/dl


 


Test


  2/5/18


06:11 2/5/18


06:28 2/5/18


11:04 


  Range/Units


 


 


Bedside Glucose 141  157  70-99  mg/dl


 


Hemoglobin  8.5   14.0-18.0  g/dL


 


Hematocrit  25.5   42-52  %


 


Sodium Level  143   136-145  mmol/L


 


Potassium Level  3.5   3.5-5.1  mmol/L


 


Chloride Level  109     mmol/L


 


Carbon Dioxide Level  26   21-32  mmol/L


 


Anion Gap  8.0   3-11  mmol/L


 


Blood Urea Nitrogen  26   7-18  mg/dl


 


Creatinine


  


  2.55


  


  


  0.60-1.40


mg/dl


 


Est Creatinine Clear Calc


Drug Dose 


  33.4


  


  


   ml/min


 


 


Estimated GFR (


American) 


  28.0


  


  


   


 


 


Estimated GFR (Non-


American 


  24.1


  


  


   


 


 


BUN/Creatinine Ratio  10.0   10-20  


 


Random Glucose  135   70-99  mg/dl


 


Calcium Level  8.0   8.5-10.1  mg/dl


 


Magnesium Level  2.4   1.8-2.4  mg/dl

## 2018-02-05 NOTE — DISCHARGE SUMMARY
Discharge Summary


Date of Service


Feb 5, 2018.





Discharge Summary


Admission Date:


Feb 2, 2018 at 17:07


Discharge Date:  Feb 5, 2018


Discharge Disposition:  Home


Principal Diagnosis:


Acute GI bleed, Acute diverticulitis


Procedures:


CT ABD:


1.  Diverticulosis of the descending and sigmoid colon. Minimal pericolonic fat


infiltration along the descending colon suggests early acute on, located


diverticulitis. No CT evidence of perforation or abscess.


2.  Chronic diverticular disease of the sigmoid colon.


3.  Mild osteopenia suggested.


4.  Subcentimeter fatty lesion at the right hepatic dome may represent an


angiomyolipoma or lipoma.


Consultations:


GI


Pending Studies/Follow-Up:


Follow up with your Primary Care Physician  on 2/8/18 at 2:45pm


Follow up with your Cardiologist  or your PCP  regarding when 

to restart your Aspirin and Plavix


Follow up with your Gastroenterologist for colonoscopy in 6-8 weeks





Complete the antibiotic course as prescribed


Seek immediate medical attention if your symptoms reoccur or worsen





Medication Reconciliation


New Medications:  


Amoxicillin & Pot Clavulanate (Amoxicillin/Clavulanate P) 1 Tab Tab


875 MG PO BIDM for 5 Days, #10 TAB





 


Continued Medications:  


Allopurinol (Zyloprim) 300 Mg Tab


300 MG PO DAILY, TAB





Amlodipine (Norvasc) 10 Mg Tab


10 MG PO DAILY, TAB





Aspirin (Aspirin Ec) 81 Mg Tab


81 MG PO DAILY





Bupropion HCl (Bupropion HCl Sr) 150 Mg Tabcr


150 MG PO BID17 for 30 Days





Cholecalciferol (Vitamin D3) 1,000 Unit Tab


2 TAB PO DAILY, TAB





Clopidogrel (Plavix) 75 Mg Tab


75 MG PO DAILY, TAB





Duloxetine Hcl (Cymbalta) 60 Mg Cap


60 MG PO DAILY, CAP





Duloxetine HCl (Cymbalta) 30 Mg Cap


1 CAP PO HS, CAP





Fenofibrate (Tricor) 200 Mg Cap


200 MG PO DAILY, CAP





Furosemide (Lasix) 20 Mg Tab


20 MG PO Q2D for 30 Days, #8 TAB 2 Refills





Home O2 Therapy (Oxygen)  Gas


2 LITERS NA DAILY PRN for shortness of breath/hypoxia for 30 Days





Insulin Aspart (Novolog Flexpen) 100 Units/Ml Inj


0 UNITS SC ACHS for 30 Days





Insulin Glargine (Lantus Solostar) 100 Unit/Ml Inj


50 UNITS SC BID, PEN





Ipratropium-Albuterol (Combivent Respimat) 1 Aer Aer


1 PUFFS INH QID PRN for SOB/Wheezing for 30 Days





Isosorbide Dinitrate (Isosorbide Dinitrate) 5 Mg Tab


10 MG PO BID for 30 Days





Levothyroxine Sodium (Levothyroxine Sodium) 50 Mcg Tab


1 TAB PO DAILYBB, TAB





Magnesium Oxide (Mag-Ox) 400 Mg Tab


400 MG PO BID, TAB





Metoprolol Succ (Toprol Xl) (Toprol-Xl) 25 Mg Tabcr


1.5 TAB PO HS, TAB





Multivitamin (Multivitamin)  Tab


1 TAB PO DAILY, TAB





Nitroglycerin (Nitrostat) 0.4 Mg Tab


0.4 MG UT PRN, BTL





Omega-3 Fatty Acids (Fish Oil 1200 mg) 1 Cap Cap


2 CAP PO BID





Pantoprazole (Protonix) 40 Mg Tab


40 MG PO DAILY, TAB





Pregabalin (Lyrica) 100 Mg Cap


100 MG PO BID for 7 Days





Rosuvastatin Calcium (Crestor) 40 Mg Tab


40 MG PO DAILY, TAB











Admission Information


HPI (per Admitting provider):


72 year old male who presents to the ED with rectal bleeding and nausea.  

Patient reports the rectal bleeding started yesterday morning. He reports 

multiple episodes of bright red bleeding per rectum. There have been clots at 

times too. Blood has continued to ooze since yesterday morning. He denies any 

pain. Today he had nausea but denies vomiting. He reports feeling lightheaded 

and dizzy but no syncopal events. He has chronic exertional shortness of breath 

which is unchanged from baseline. No chest pain. He denies fever and chills. He 

has chronic urinary hesitancy. In the ED, patient's hgb is found to be 8.4. 

Vitals are stable. CT ordered by myself shows an early descending colon 

diverticulitis. He was given IVF in the ED.


Physical Exam (per Admitting):  


   General Appearance:  WD/WN, no apparent distress


   Head:  normocephalic, atraumatic


   Eyes:  normal inspection, EOMI, sclerae normal


   ENT:  hearing grossly normal, + pertinent finding (mucous membranes moist)


   Neck:  supple, no JVD, trachea midline


   Respiratory/Chest:  lungs clear, normal breath sounds, no respiratory 

distress


   Cardiovascular:  regular rate, rhythm, no edema, normal peripheral pulses


   Abdomen/GI:  normal bowel sounds, soft, no organomegaly, + tenderness (mild, 

LLQ)


   Extremities/Musculoskelatal:  normal inspection, no calf tenderness, normal 

capillary refill, + pertinent finding (s/p toe amputations on right foot)


   Neurologic/Psych:  no motor/sensory deficits, alert, normal mood/affect, 

oriented x 3


   Skin:  normal color, warm/dry, + pertinent finding (open callous noted to 

left gret toe - no drainage or surrounding erythema)





Hospital Course





Acute Diverticulitis


Acute Blood Loss Anemia 


Acute Lower GI bleeding  (H/O Diverticulosis, Polyps)


Patient presented with persistent bright red bleeding per rectum


S/P 2 units PRBC


Monitor H&H


ASA/Plavix held for now


Transfuse PRBC PRN


Last Colonoscopy in 2012: Diverticulosis throughout entire colon 


CT: Suggestive of  Early diverticulitis in the descending colon


Continue Unasyn Day # 4>>switch to PO Cipro and Flagyl


Appreciate GI Input


Needs Colonoscopy in 6-8 weeks as outpatient


Hb:8.5 today








CAD


S/P AICD


stable


hold ASA and Plavix  for now


continue beta blocker, nitrate, and statin 








HTN


Stable


continue amlodipine, isosorbide, and metoprolol 








H/O Diastolic CHF


Appears euvolemic


hold Lasix for now 








CKD IV:


Cr at baseline


monitor renal function


 





H/O P.A.fib


Rate controlled


continue Metoprolol


Not a candidate for anticoagulation


monitor electrolytes


 





DM II


Last A1C:7.0 10/2017


Continue ISS, Lantus








Hypothyroidism: 


continue levothyroxine








DVT Px:


SCDs Re: GI bleed








Code Status:


Full Code





Disposition:


Plan to discharge home today





Follow up with your Primary Care Physician  on 2/8/18 at 2:45pm


Follow up with your Cardiologist  or your PCP  regarding when 

to restart your Aspirin and Plavix


Follow up with your Gastroenterologist for colonoscopy in 6-8 weeks





Complete the antibiotic course as prescribed


Seek immediate medical attention if your symptoms reoccur or worsen


Total time spent on discharge = 35 minutes


This includes examination of the patient, discharge planning, medication 

reconciliation, and communication with other providers.





Discharge Instructions


Discharge Instructions


Date of Service


Feb 5, 2018.





Admission


Reason for Admission:  Gi Bleed





Discharge


Discharge Diagnosis / Problem:  Acute GI bleed, Acute diverticulitis





Discharge Goals


Goal(s):  Decrease discomfort, Improve function





Activity Recommendations


Activity Limitations:  resume your previous activity


Exercise/Sports Limitations:  as tolerated





.





Instructions / Follow-Up


Instructions / Follow-Up


Follow up with your Primary Care Physician  on 2/8/18 at 2:45pm


Follow up with your Cardiologist  or your PCP  regarding when 

to restart your Aspirin and Plavix


Follow up with your Gastroenterologist for colonoscopy in 6-8 weeks





Complete the antibiotic course as prescribed


Seek immediate medical attention if your symptoms reoccur or worsen





Current Hospital Diet


Patient's current hospital diet: Diabetes Type 2 Diet





Discharge Diet


Recommended Diet:  Diabetes Type 2 Diet





Pending Studies


Studies pending at discharge:  no





Laboratory Results





Hemoglobin A1c








Test


  2/4/18


05:42 Range/Units


 


 


Estimated Average Glucose 180   mg/dl


 


Hemoglobin A1c 7.9 H 4.5-5.6  %











Medical Emergencies








.


Who to Call and When:





Medical Emergencies:  If at any time you feel your situation is an emergency, 

please call 911 immediately.





.





Non-Emergent Contact


Non-Emergency issues call your:  Primary Care Provider, Gastroenterologist


Call Non-Emergent contact if:  you have a fever, your pain is not controlled, 

your pain is worsening, your pain is unusual for you, your pain is concerning 

you, you have any medication questions


Seek immediate medical attention if your symptoms reoccur or worsen


.


.








"Provider Documentation" section prepared by Adriano Boyce.








.





VTE Core Measure


Inpt VTE Proph given/why not?:  SCD's











<Electronically signed by Adriano Boyce MD>





  Signed:  02/05/18 1238


  Signed:  





The status of this report is Signed


* If report status is Draft, the document has not been finalized by the 

responsible provider.

## 2018-02-05 NOTE — GASTROENTEROLOGY PROGRESS NOTE
Progress Note


Date of Service:  Feb 5, 2018


Subjective


Pt evaluation today including:  conversation w/ patient, physical exam, chart 

review, lab review, review of inpatient medication list


Pt report some BM this AM w dark maroon stool. Denies any n/v, abd pain. No 

fever, chills, CP, SOB overnight. Hgb stable at 8.





Review of Systems


Constitutional:  No fever, No chills


Respiratory:  No cough, No shortness of breath


Cardiac:  No chest pain


Abdomen:  + see HPI, + GI bleeding, No pain, No nausea, No vomiting





Medications





Current Inpatient Medications








 Medications


  (Trade)  Dose


 Ordered  Sig/Richard


 Route  Start Time


 Stop Time Status Last Admin


Dose Admin


 


 Acetaminophen


  (Tylenol Tab)  650 mg  Q4H  PRN


 PO  2/2/18 17:15


 3/4/18 17:14   


 


 


 Ondansetron HCl


  (Zofran Inj)  4 mg  Q6H  PRN


 IV  2/2/18 17:15


 3/4/18 17:14   


 


 


 Allopurinol


  (Zyloprim Tab)  300 mg  DAILY


 PO  2/3/18 09:00


 3/5/18 08:59  2/5/18 08:28


300 MG


 


 Amlodipine


 Besylate


  (Norvasc Tab)  10 mg  DAILY


 PO  2/3/18 09:00


 3/5/18 08:59  2/5/18 08:29


10 MG


 


 Bupropion HCl


  (Wellbutrin-Sr


 Tab)  150 mg  BID17


 PO  2/3/18 09:00


 3/5/18 08:59  2/5/18 08:29


150 MG


 


 Cholecalciferol


  (Vitamin D Tab)  2,000


 inter.unit  DAILY


 PO  2/3/18 09:00


 3/5/18 08:59  2/5/18 08:32


2,000 INTER.UNIT


 


 Duloxetine HCl


  (Cymbalta Cap)  30 mg  HS


 PO  2/2/18 21:00


 3/4/18 20:59  2/4/18 21:51


30 MG


 


 Duloxetine HCl


  (Cymbalta Cap)  60 mg  DAILY


 PO  2/3/18 09:00


 3/5/18 08:59  2/5/18 08:28


60 MG


 


 Isosorbide


 Dinitrate


  (Isordil Tab)  10 mg  BID@0700,1200


 PO  2/3/18 07:00


 3/5/18 06:59  2/5/18 08:28


10 MG


 


 Levothyroxine


 Sodium


  (Synthroid Tab)  50 mcg  DAILYBB


 PO  2/3/18 06:00


 3/5/18 06:59  2/5/18 06:12


50 MCG


 


 Magnesium Oxide


  (Mag-Ox Tab)  400 mg  BID


 PO  2/2/18 21:00


 3/4/18 20:59  2/5/18 08:30


400 MG


 


 Metoprolol


 Succinate


  (Toprol Xl Tab)  37.5 mg  HS


 PO  2/2/18 21:00


 3/4/18 20:59  2/4/18 21:51


37.5 MG


 


 Multivitamins


  (Multivitamin


 Tab)  1 tab  DAILY


 PO  2/3/18 09:00


 3/5/18 08:59  2/5/18 08:30


1 TAB


 


 Pantoprazole


 Sodium


  (Protonix Tab)  40 mg  DAILY


 PO  2/3/18 09:00


 3/5/18 08:59  2/5/18 08:30


40 MG


 


 Pregabalin


  (Lyrica Cap)  100 mg  BID


 PO  2/2/18 21:00


 3/4/18 20:59  2/5/18 08:36


100 MG


 


 Rosuvastatin


 Calcium


  (Crestor Tab)  40 mg  DAILY


 PO  2/3/18 09:00


 3/5/18 08:59  2/5/18 08:29


40 MG


 


 Insulin Aspart


  (novoLOG ASPART)  **SLIDING


 SCALE**


 **If C...  ACHS


 SC  2/2/18 21:00


 3/4/18 20:59  2/5/18 08:41


7 UNITS


 


 Glucose


  (Glucose 40% Gel)  15-30


 GRAMS 15


 GRAMS...  UD  PRN


 PO  2/2/18 17:30


 3/4/18 17:29   


 


 


 Glucose


  (Glucose Chew


 Tab)  4-8


 Tablets 4


 Tabl...  UD  PRN


 PO  2/2/18 17:30


 3/4/18 17:29   


 


 


 Dextrose


  (Dextrose 50%


 50ML Syringe)  25-50ML OF


 50% DW IV


 FOR...  UD  PRN


 IV  2/2/18 17:30


 3/4/18 17:29   


 


 


 Glucagon


  (Glucagon Inj)  1 mg  UD  PRN


 SQ  2/2/18 17:30


 3/4/18 17:29   


 


 


 Miscellaneous


 Information


  (Consult


 Glycemic


 Management


 Pharmacy)  1 ea  UD  PRN


 N/A  2/2/18 19:15


 3/4/18 19:14   


 


 


 Ampicillin Sodium/


 Sulbactam Sodium


 1500 mg/Sodium


 Chloride  104 ml @ 


 200 mls/hr  Q6H


 IV  2/2/18 21:00


 2/12/18 20:59  2/5/18 08:36


200 MLS/HR


 


 Fenofibrate


  (Tricor)  200 mg  QAM


 PO  2/4/18 09:00


 3/6/18 08:59  2/5/18 08:31


200 MG


 


 Insulin Glargine


  (Lantus Solostar


 Pen)  18 units  Q12


 SC  2/5/18 09:00


 3/7/18 08:59  2/5/18 08:42


18 UNITS











Objective


Vital Signs











  Date Time  Temp Pulse Resp B/P (MAP) Pulse Ox O2 Delivery O2 Flow Rate FiO2


 


2/5/18 08:00      Room Air  


 


2/5/18 07:22 37.1 68 20 147/70 (95) 97 Room Air  


 


2/5/18 04:00      Room Air  


 


2/5/18 03:38 37.0 71 18 146/72 (96) 90 Room Air  


 


2/5/18 00:02      Room Air  


 


2/4/18 23:35 36.7 74 20 154/61 (92) 92 Room Air  


 


2/4/18 20:00      Room Air  


 


2/4/18 19:35 36.5 65 20 148/73 (98) 92 Room Air  


 


2/4/18 16:00      Room Air  


 


2/4/18 15:26 36.7 56 18 132/68 (89) 94 Room Air  


 


2/4/18 12:55 36.8 70 18 152/74 (100) 90 Room Air  


 


2/4/18 12:00      Room Air  











Physical Exam


General Appearance:  WD/WN, no apparent distress


Eyes:  normal inspection, PERRL, EOMI


Neck:  supple, no JVD, trachea midline


Respiratory/Chest:  normal breath sounds, no respiratory distress, no accessory 

muscle use


Cardiovascular:  regular rate, rhythm, no gallop, no murmur


Abdomen:  normal bowel sounds, non tender, soft


Extremities:  normal inspection, no pedal edema, no calf tenderness


Neurologic/Psych:  alert, normal mood/affect, oriented x 3


Skin:  normal color, no jaundice, no rash





Laboratory Results





Last 24 Hours








Test


  2/4/18


11:48 2/4/18


16:20 2/4/18


20:38 2/5/18


00:04


 


Bedside Glucose 187 mg/dl  258 mg/dl  138 mg/dl  161 mg/dl 


 


Test


  2/5/18


04:12 2/5/18


06:28 


  


 


 


Bedside Glucose 144 mg/dl    


 


Hemoglobin  8.5 g/dL   


 


Hematocrit  25.5 %   


 


Sodium Level  143 mmol/L   


 


Potassium Level  3.5 mmol/L   


 


Chloride Level  109 mmol/L   


 


Carbon Dioxide Level  26 mmol/L   


 


Anion Gap  8.0 mmol/L   


 


Blood Urea Nitrogen  26 mg/dl   


 


Creatinine  2.55 mg/dl   


 


Est Creatinine Clear Calc


Drug Dose 


  33.4 ml/min 


  


  


 


 


Estimated GFR (


American) 


  28.0 


  


  


 


 


Estimated GFR (Non-


American 


  24.1 


  


  


 


 


BUN/Creatinine Ratio  10.0   


 


Random Glucose  135 mg/dl   


 


Calcium Level  8.0 mg/dl   


 


Magnesium Level  2.4 mg/dl   











Assessment and Plan


Pt is a 73 y/o male seen for painless rectal bleeding. CT abd/pelvis w signs of 

possible early diverticulitis in descending/sigmoid colon.. he's been on ASA 

and Plavix for hx of cardiovascular disease. Last colonoscopy 2012 with 

findings of diverticulosis and tubular adenoma polyps. DDx: diverticular bleed, 

ischemic/infectious colitis. Cdiff negative, stool cx  pending. 





- Monitor H/H and transfuse prn. 


- Diet as tolerated


- Continue antibiotic treatment for diverticulitis, may convert to PO 

antibiotics to continue treatment for 10 days upon DC


- He needs a colonoscopy in 6-8 week's time and we will contact pt for appt. 


- Will signs off; call if new questions/concerns arise. 








I performed a history and physical examination of the patient, including 

specifically on physical exam - abdomen is soft.I have discussed the patient's 

management with Linda Pedroza.  Please refer to the NP's note for the 

documented findings and plan of care.


Colonoscopy in 6-8 weeks as outpatient.

## 2018-02-05 NOTE — DISCHARGE INSTRUCTIONS
Discharge Instructions


Date of Service


Feb 5, 2018.





Admission


Reason for Admission:  Gi Bleed





Discharge


Discharge Diagnosis / Problem:  Acute GI bleed, Acute diverticulitis





Discharge Goals


Goal(s):  Decrease discomfort, Improve function





Activity Recommendations


Activity Limitations:  resume your previous activity


Exercise/Sports Limitations:  as tolerated





.





Instructions / Follow-Up


Instructions / Follow-Up


Follow up with your Primary Care Physician  on 2/8/18 at 2:45pm


Follow up with your Cardiologist  or your PCP  regarding when 

to restart your Aspirin and Plavix


Follow up with your Gastroenterologist for colonoscopy in 6-8 weeks





Complete the antibiotic course as prescribed


Seek immediate medical attention if your symptoms reoccur or worsen





Current Hospital Diet


Patient's current hospital diet: Diabetes Type 2 Diet





Discharge Diet


Recommended Diet:  Diabetes Type 2 Diet





Pending Studies


Studies pending at discharge:  no





Laboratory Results





Hemoglobin A1c








Test


  2/4/18


05:42 Range/Units


 


 


Estimated Average Glucose 180   mg/dl


 


Hemoglobin A1c 7.9 H 4.5-5.6  %











Medical Emergencies








.


Who to Call and When:





Medical Emergencies:  If at any time you feel your situation is an emergency, 

please call 911 immediately.





.





Non-Emergent Contact


Non-Emergency issues call your:  Primary Care Provider, Gastroenterologist


Call Non-Emergent contact if:  you have a fever, your pain is not controlled, 

your pain is worsening, your pain is unusual for you, your pain is concerning 

you, you have any medication questions


Seek immediate medical attention if your symptoms reoccur or worsen


.


.








"Provider Documentation" section prepared by Adriano Boyce.








.





VTE Core Measure


Inpt VTE Proph given/why not?:  SCD's

## 2018-02-05 NOTE — PHARMACY PROGRESS NOTE
Glycemic Control Progress Note


Date of Service


Feb 5, 2018.





Scope


Glycemic Pharmacist consulted for glycemic control to write orders per [facility] 

inpatient glycemic control protocol.





Objective


Accuchecks BSG (last 24hrs):











Test


  2/4/18


11:48 2/4/18


16:20 2/4/18


20:38 2/5/18


00:04


 


Bedside Glucose


  187 mg/dl


(70-99) 258 mg/dl


(70-99) 138 mg/dl


(70-99) 161 mg/dl


(70-99)


 


Test


  2/5/18


04:12 2/5/18


06:28 


  


 


 


Bedside Glucose


  144 mg/dl


(70-99) 


  


  


 


 


Random Glucose


  


  135 mg/dl


(70-99) 


  


 








HbA1c:











Test


  2/4/18


05:42


 


Hemoglobin A1c


  7.9 %


(4.5-5.6)  H











Recent Pertinent Medications





The patient is currently receiving:


* Basal insulin:             Lantus 14 units qAM + 22 units qPM (14 units BID 

prior to 2/4)


* Correctional Insulin:   Novolog Correction per scale ACHS


                          Goal Range: Low 110 mg/dL - High 140 mg/dL


                          Correction Factor: 20 mg/dL/unit





* Prandial insulin:         Per carb ratio of 1 unit per 8 grams CHO consumed





Outpatient Anti-Diabetic Meds


* Lantus 50 units BID


* Novolog ACHS 


* A1c = 7.9 %  2/4/18





Assessment & Plan


ASSESSMENT:


* See progress note from 2/3/18 for more background info, in short:


 


* Pt receiving SQ basal bolus insulin regimen for hyperglycemia secondary to 

baseline DM (outpatient regimen on hold) and stress


* Patient is currently receiving an average of 69 units of insulin per day


 * 36 units of basal insulin


 * 33 units of prandial/correctional insulin


 * BSGs ranging 145 -  258 mg/dl over the past 24hrs





* Changes needed to insulin regimen: 


 * AM Fasting BSG = 135 mg/dl. This is near goal range for patient based on 

inpatient targets and co-morbidities. No changes to basal insulin.


 * Post-prandial BSGs became elevated yesterday, therefore carb ratio was 

tightened. Will continue same for now. 


 * Anticipate total daily dose around 80 units.   





PLAN FOR INPATIENT GLYCEMIC CONTROL:





* Basal insulin


 * Lantus 18 units SQ BID





* Bolus insulin 


 * NovoLog per scale ACHS or Q6hrs while NPO


 * Goal Range:  Low 110 mg/dL - High 140 mg/dL


 * Correction Factor: 20 mg/dL/unit


 * Nutritional / Prandial insulin per carb ratio of 1 unit per 8 grams CHO 

consumed








* Please note that the plan above was derived based on current level of insulin 

resistance and hospital stress. These recommendations are appropriate for 

inpatient admission only. Plan of care upon discharge will need to be 

reassessed to avoid potential outpatient hypo/hyperglycemia. 





Thank you.

## 2021-03-11 NOTE — NEPHROLOGY PROGRESS NOTE
How Severe Are Your Spot(S)?: mild Nephrology Progress Note


Date of Service:


Oct 22, 2017.


Subjective


no voiding c/o; notes dependent edema; breathing improved but still 02 dependent

; has been using cpap/bipap hs and slept well w/ it.  still w/ tremors/ 

weakness but these are better





Objective











  Date Time  Temp Pulse Resp B/P (MAP) Pulse Ox O2 Delivery O2 Flow Rate FiO2


 


10/22/17 08:00      Nasal Cannula 2.0 





      BiPAP  


 


10/22/17 07:14 36.7 67 22 153/74 (100) 96 Nasal Cannula 2.0 


 


10/22/17 04:20 36.7 71 20 128/57 (80) 98 CPAP 2.0 


 


10/22/17 04:00      Nasal Cannula 2.0 


 


10/22/17 00:00      Nasal Cannula 2.0 


 


10/21/17 23:20  74   93  2.0 30


 


10/21/17 23:00 36.3 71 20 124/69 (87) 94 Nasal Cannula 2.0 


 


10/21/17 20:00      Nasal Cannula 2.0 


 


10/21/17 19:17 36.7 71 17 146/80 (102) 95 Nasal Cannula 2.0 


 


10/21/17 16:00      Nasal Cannula 2.0 





      BiPAP  


 


10/21/17 15:06 36.7 62 20 129/72 (91) 95 Nasal Cannula 2.0 


 


10/21/17 12:00      Nasal Cannula 2.0 





      BiPAP  


 


10/21/17 11:04 36.5 60 20 131/73 (92) 96 Nasal Cannula 2.0 








Physical Exam:


GENERAL:  Awake, alert, oriented x3, obese.  on 02NC, up in chair


HEENT:  Moist mucous membranes.


NECK:  Supple.


PULMONARY:  Clear to auscultation w/diminished air entry


CARDIAC:  Regular rate and rhythm.


ABDOMEN:  Bowel sounds positive, soft, nontender.


EXTREMITIES:  No significant clubbing, cyanosis; at most trace BL ankle edema.  

+ R transmetatarsal amputation.


NEUROLOGIC:  [place], fluent speech


DERMATOLOGIC:  No rash or ulcers noted.





Current Inpatient Medications








 Medications


  (Trade)  Dose


 Ordered  Sig/Richard


 Route  Start Time


 Stop Time Status Last Admin


Dose Admin


 


 Heparin Sodium


  (Porcine)


  (Heparin Sq 5000


 Unit/0.5ml)  5,000 unit  Q8


 SQ  10/15/17 14:00


 11/14/17 13:59  10/22/17 05:45


5,000 UNIT


 


 Insulin Aspart


  (novoLOG ASPART)  **SLIDING


 SCALE**


 **If C...  ACHS


 SC  10/15/17 11:00


 11/14/17 10:59  10/22/17 08:15


4 UNITS


 


 Glucose


  (Glucose 40% Gel)  15-30


 GRAMS 15


 GRAMS...  UD  PRN


 PO  10/15/17 10:45


 11/14/17 10:44   


 


 


 Glucose


  (Glucose Chew


 Tab)  4-8


 Tablets 4


 Tabl...  UD  PRN


 PO  10/15/17 10:45


 11/14/17 10:44   


 


 


 Dextrose


  (Dextrose 50%


 50ML Syringe)  25-50ML OF


 50% DW IV


 FOR...  UD  PRN


 IV  10/15/17 10:45


 11/14/17 10:44   


 


 


 Glucagon


  (Glucagon Inj)  1 mg  UD  PRN


 SQ  10/15/17 10:45


 11/14/17 10:44   


 


 


 Amlodipine


 Besylate


  (Norvasc Tab)  10 mg  DAILY


 PO  10/16/17 09:00


 11/15/17 08:59  10/22/17 07:56


10 MG


 


 Clopidogrel


 Bisulfate


  (plAVix TAB)  75 mg  DAILY


 PO  10/16/17 09:00


 11/15/17 08:59  10/22/17 07:58


75 MG


 


 Duloxetine HCl


  (Cymbalta Cap)  30 mg  HS


 PO  10/15/17 21:00


 11/14/17 20:59  10/21/17 20:49


30 MG


 


 Duloxetine HCl


  (Cymbalta Cap)  60 mg  DAILY


 PO  10/16/17 09:00


 11/15/17 08:59  10/22/17 07:57


60 MG


 


 Levothyroxine


 Sodium


  (Synthroid Tab)  50 mcg  DAILYBB


 PO  10/16/17 06:30


 11/15/17 06:59  10/22/17 05:42


50 MCG


 


 Magnesium Oxide


  (Mag-Ox Tab)  400 mg  BID


 PO  10/15/17 21:00


 11/14/17 20:59  10/22/17 07:57


400 MG


 


 Metoprolol


 Succinate


  (Toprol Xl Tab)  37.5 mg  HS


 PO  10/15/17 21:00


 11/14/17 20:59  10/21/17 20:50


37.5 MG


 


 Multivitamins


  (Multivitamin


 Tab)  1 tab  DAILY


 PO  10/16/17 09:00


 11/15/17 08:59  10/22/17 07:57


1 TAB


 


 Pantoprazole


 Sodium


  (Protonix Tab)  40 mg  DAILY


 PO  10/16/17 09:00


 11/15/17 08:59  10/22/17 07:57


40 MG


 


 Rosuvastatin


 Calcium


  (Crestor Tab)  40 mg  DAILY


 PO  10/16/17 09:00


 11/15/17 08:59  10/22/17 07:57


40 MG


 


 Sodium Chloride


  (Ocean Nasal


 Spray)  1 sprays  PRN  PRN


 NA  10/15/17 16:45


 11/14/17 16:44   


 


 


 Promethazine HCl


 12.5 mg/Sodium


 Chloride  50.5 ml @ 


 204 mls/hr  Q6H  PRN


 IV  10/15/17 22:15


 11/14/17 22:14  10/15/17 22:29


204 MLS/HR


 


 Albuterol/


 Ipratropium


  (Combivent


 Respimat Inh)  1 puffs  QID


 INH  10/17/17 13:00


 11/16/17 12:59  10/22/17 07:59


1 PUFFS


 


 Miscellaneous


 Information


  (Consult


 Glycemic


 Management


 Pharmacy)  1 ea  UD  PRN


 N/A  10/17/17 09:45


 11/16/17 09:44   


 


 


 Aspirin


  (Ecotrin Tab)  81 mg  DAILY


 PO  10/18/17 09:00


 11/17/17 08:59  10/22/17 07:58


81 MG


 


 Fenofibrate


  (Tricor)  200 mg  QAM


 PO  10/19/17 09:00


 11/18/17 08:59  10/22/17 07:59


200 MG


 


 Doxycycline


 Hyclate


  (Vibramycin Cap)  100 mg  BID


 PO  10/19/17 10:00


 10/24/17 23:59  10/22/17 07:58


100 MG


 


 Insulin Glargine


  (Lantus Solostar


 Pen)  18 units  BID


 SC  10/20/17 21:00


 11/19/17 20:59  10/22/17 08:15


18 UNITS


 


 Isosorbide


 Dinitrate


  (Isordil Tab)  10 mg  BID


 PO  10/21/17 09:00


 11/19/17 15:59  10/22/17 07:59


10 MG


 


 Bupropion HCl


  (Wellbutrin-Sr


 Tab)  150 mg  BID17


 PO  10/21/17 17:00


 11/14/17 20:59  10/21/17 20:50


150 MG


 


 Imipramine HCl


  (Tofranil Tab)  25 mg  HS


 PO  10/21/17 21:00


 11/14/17 20:59  10/21/17 20:50


25 MG


 


 Pregabalin


  (Lyrica Cap)  100 mg  BID


 PO  10/21/17 21:00


 11/20/17 20:59  10/22/17 07:56


100 MG











Last 24 Hours








Test


  10/21/17


11:24 10/21/17


16:13 10/21/17


20:05 10/22/17


06:46


 


Bedside Glucose 126 mg/dl  99 mg/dl  113 mg/dl  


 


Sodium Level    141 mmol/L 


 


Potassium Level    4.0 mmol/L 


 


Chloride Level    105 mmol/L 


 


Carbon Dioxide Level    30 mmol/L 


 


Anion Gap    7.0 mmol/L 


 


Blood Urea Nitrogen    37 mg/dl 


 


Creatinine    2.65 mg/dl 


 


Est Creatinine Clear Calc


Drug Dose 


  


  


  32.2 ml/min 


 


 


Estimated GFR (


American) 


  


  


  26.7 


 


 


Estimated GFR (Non-


American 


  


  


  23.0 


 


 


BUN/Creatinine Ratio    14.0 


 


Random Glucose    114 mg/dl 


 


Calcium Level    8.7 mg/dl 


 


Test


  10/22/17


07:17 


  


  


 


 


Bedside Glucose 112 mg/dl    











Assessment & Plan


73 y/o M w/ second emergent eval w/in a month for acute dyspnea admitted 10/15 

for same.  He had DICK last month w/ creatinine up to 4; his spironolactone and 

lisinopril were stopped and renal function came back to baseline creatinine in 

mid 2's.





-Chronic kidney disease stage IV in the setting of heart disease, hypertension, 

longstanding diabetes and chew tobacco.  


-at baseline; cont current meds/ care; daily bmp pls while in house





-Hypertension. acceptable control 





-Shortness of breath, improving.  likely multifactorial-favor at this point 

bronchitis, ?obesity hypoventilation, untreated sleep apnea>pulmonary HTN; do 

note he has had marked improvement with using cpap/bipap (has tolerated in 

hospital) and on 02nc; do not feel at this point there is diastolic HF to 

explain his dyspnea; do not believe he needs standing diuretics.  I did stress w

/ pt importance of using cpap/bipap after d/c and he agrees


>primary hospitalist to work on getting pt cpap/bipap after d/c (in past not 

tolerated)


>consider CT chest to ensure no other processes to explain dypsnea since he 

came in w/ no vol OL





Will follow with you.  Care coordinated w/ Dr. Padilla What Type Of Note Output Would You Prefer (Optional)?: Standard Output What Is The Reason For Today's Visit?: Full Body Skin Examination What Is The Reason For Today's Visit? (Being Monitored For X): concerning skin lesions on an annual basis